# Patient Record
Sex: FEMALE | Race: WHITE | NOT HISPANIC OR LATINO | Employment: OTHER | URBAN - METROPOLITAN AREA
[De-identification: names, ages, dates, MRNs, and addresses within clinical notes are randomized per-mention and may not be internally consistent; named-entity substitution may affect disease eponyms.]

---

## 2017-01-13 ENCOUNTER — ALLSCRIPTS OFFICE VISIT (OUTPATIENT)
Dept: OTHER | Facility: OTHER | Age: 62
End: 2017-01-13

## 2017-01-13 ENCOUNTER — LAB REQUISITION (OUTPATIENT)
Dept: LAB | Facility: HOSPITAL | Age: 62
End: 2017-01-13
Payer: COMMERCIAL

## 2017-01-13 DIAGNOSIS — R39.9 UNSPECIFIED SYMPTOMS AND SIGNS INVOLVING THE GENITOURINARY SYSTEM: ICD-10-CM

## 2017-01-13 DIAGNOSIS — Z12.31 ENCOUNTER FOR SCREENING MAMMOGRAM FOR MALIGNANT NEOPLASM OF BREAST: ICD-10-CM

## 2017-01-13 LAB
BILIRUB UR QL STRIP: NORMAL
CLARITY UR: NORMAL
COLOR UR: NORMAL
GLUCOSE (HISTORICAL): NORMAL
HGB UR QL STRIP.AUTO: NORMAL
KETONES UR STRIP-MCNC: NORMAL MG/DL
LEUKOCYTE ESTERASE UR QL STRIP: NORMAL
NITRITE UR QL STRIP: NORMAL
PH UR STRIP.AUTO: 5 [PH]
PROT UR STRIP-MCNC: NORMAL MG/DL
SP GR UR STRIP.AUTO: 1.02
UROBILINOGEN UR QL STRIP.AUTO: 0.2

## 2017-01-13 PROCEDURE — 87186 SC STD MICRODIL/AGAR DIL: CPT | Performed by: FAMILY MEDICINE

## 2017-01-13 PROCEDURE — 87086 URINE CULTURE/COLONY COUNT: CPT | Performed by: FAMILY MEDICINE

## 2017-01-13 PROCEDURE — 87077 CULTURE AEROBIC IDENTIFY: CPT | Performed by: FAMILY MEDICINE

## 2017-01-15 LAB — BACTERIA UR CULT: NORMAL

## 2017-01-24 ENCOUNTER — LAB REQUISITION (OUTPATIENT)
Dept: LAB | Facility: HOSPITAL | Age: 62
End: 2017-01-24
Payer: COMMERCIAL

## 2017-01-24 ENCOUNTER — ALLSCRIPTS OFFICE VISIT (OUTPATIENT)
Dept: OTHER | Facility: OTHER | Age: 62
End: 2017-01-24

## 2017-01-24 DIAGNOSIS — R39.9 UNSPECIFIED SYMPTOMS AND SIGNS INVOLVING THE GENITOURINARY SYSTEM: ICD-10-CM

## 2017-01-24 LAB
BILIRUB UR QL STRIP: NORMAL
CLARITY UR: NORMAL
COLOR UR: CLEAR
GLUCOSE (HISTORICAL): NORMAL
HGB UR QL STRIP.AUTO: NORMAL
KETONES UR STRIP-MCNC: NORMAL MG/DL
LEUKOCYTE ESTERASE UR QL STRIP: NORMAL
NITRITE UR QL STRIP: NORMAL
PH UR STRIP.AUTO: 6 [PH]
PROT UR STRIP-MCNC: NORMAL MG/DL
SP GR UR STRIP.AUTO: 1
UROBILINOGEN UR QL STRIP.AUTO: 0.2

## 2017-01-24 PROCEDURE — 87086 URINE CULTURE/COLONY COUNT: CPT | Performed by: FAMILY MEDICINE

## 2017-01-25 LAB — BACTERIA UR CULT: NORMAL

## 2017-03-02 ENCOUNTER — ALLSCRIPTS OFFICE VISIT (OUTPATIENT)
Dept: OTHER | Facility: OTHER | Age: 62
End: 2017-03-02

## 2017-03-02 DIAGNOSIS — R74.8 ABNORMAL LEVELS OF OTHER SERUM ENZYMES: ICD-10-CM

## 2017-05-05 ENCOUNTER — ALLSCRIPTS OFFICE VISIT (OUTPATIENT)
Dept: OTHER | Facility: OTHER | Age: 62
End: 2017-05-05

## 2017-05-15 ENCOUNTER — ALLSCRIPTS OFFICE VISIT (OUTPATIENT)
Dept: OTHER | Facility: OTHER | Age: 62
End: 2017-05-15

## 2017-05-15 ENCOUNTER — GENERIC CONVERSION - ENCOUNTER (OUTPATIENT)
Dept: OTHER | Facility: OTHER | Age: 62
End: 2017-05-15

## 2017-06-05 ENCOUNTER — ALLSCRIPTS OFFICE VISIT (OUTPATIENT)
Dept: OTHER | Facility: OTHER | Age: 62
End: 2017-06-05

## 2017-06-05 DIAGNOSIS — I10 ESSENTIAL (PRIMARY) HYPERTENSION: ICD-10-CM

## 2017-06-05 DIAGNOSIS — E07.9 DISORDER OF THYROID: ICD-10-CM

## 2017-06-05 DIAGNOSIS — R74.8 ABNORMAL LEVELS OF OTHER SERUM ENZYMES: ICD-10-CM

## 2017-06-15 ENCOUNTER — LAB (OUTPATIENT)
Dept: LAB | Facility: HOSPITAL | Age: 62
End: 2017-06-15
Attending: NEUROLOGICAL SURGERY
Payer: COMMERCIAL

## 2017-06-15 ENCOUNTER — TRANSCRIBE ORDERS (OUTPATIENT)
Dept: LAB | Facility: HOSPITAL | Age: 62
End: 2017-06-15

## 2017-06-15 ENCOUNTER — ALLSCRIPTS OFFICE VISIT (OUTPATIENT)
Dept: OTHER | Facility: OTHER | Age: 62
End: 2017-06-15

## 2017-06-15 DIAGNOSIS — Z01.812 PRE-OPERATIVE LABORATORY EXAMINATION: ICD-10-CM

## 2017-06-15 DIAGNOSIS — Z79.891 ENCOUNTER FOR LONG-TERM METHADONE USE: ICD-10-CM

## 2017-06-15 DIAGNOSIS — Z79.01 LONG TERM (CURRENT) USE OF ANTICOAGULANTS: ICD-10-CM

## 2017-06-15 DIAGNOSIS — G89.4 CHRONIC PAIN SYNDROME: Primary | ICD-10-CM

## 2017-06-15 DIAGNOSIS — G89.4 CHRONIC PAIN SYNDROME: ICD-10-CM

## 2017-06-15 LAB
ANION GAP SERPL CALCULATED.3IONS-SCNC: 4 MMOL/L (ref 4–13)
APTT PPP: 33 SECONDS (ref 23–35)
ATRIAL RATE: 51 BPM
ATRIAL RATE: 52 BPM
BASOPHILS # BLD AUTO: 0.02 THOUSANDS/ΜL (ref 0–0.1)
BASOPHILS NFR BLD AUTO: 0 % (ref 0–1)
BILIRUB UR QL STRIP: NEGATIVE
BUN SERPL-MCNC: 9 MG/DL (ref 5–25)
CALCIUM SERPL-MCNC: 9.2 MG/DL (ref 8.3–10.1)
CHLORIDE SERPL-SCNC: 105 MMOL/L (ref 100–108)
CLARITY UR: CLEAR
CO2 SERPL-SCNC: 29 MMOL/L (ref 21–32)
COLOR UR: YELLOW
CREAT SERPL-MCNC: 0.67 MG/DL (ref 0.6–1.3)
EOSINOPHIL # BLD AUTO: 0.09 THOUSAND/ΜL (ref 0–0.61)
EOSINOPHIL NFR BLD AUTO: 1 % (ref 0–6)
ERYTHROCYTE [DISTWIDTH] IN BLOOD BY AUTOMATED COUNT: 16.1 % (ref 11.6–15.1)
EST. AVERAGE GLUCOSE BLD GHB EST-MCNC: 117 MG/DL
GFR SERPL CREATININE-BSD FRML MDRD: >60 ML/MIN/1.73SQ M
GLUCOSE P FAST SERPL-MCNC: 79 MG/DL (ref 65–99)
GLUCOSE UR STRIP-MCNC: NEGATIVE MG/DL
HBA1C MFR BLD: 5.7 % (ref 4.2–6.3)
HCT VFR BLD AUTO: 37.3 % (ref 34.8–46.1)
HGB BLD-MCNC: 12.3 G/DL (ref 11.5–15.4)
HGB UR QL STRIP.AUTO: NEGATIVE
INR PPP: 0.93 (ref 0.86–1.16)
KETONES UR STRIP-MCNC: NEGATIVE MG/DL
LEUKOCYTE ESTERASE UR QL STRIP: NEGATIVE
LYMPHOCYTES # BLD AUTO: 2.81 THOUSANDS/ΜL (ref 0.6–4.47)
LYMPHOCYTES NFR BLD AUTO: 42 % (ref 14–44)
MCH RBC QN AUTO: 29.4 PG (ref 26.8–34.3)
MCHC RBC AUTO-ENTMCNC: 33 G/DL (ref 31.4–37.4)
MCV RBC AUTO: 89 FL (ref 82–98)
MONOCYTES # BLD AUTO: 0.57 THOUSAND/ΜL (ref 0.17–1.22)
MONOCYTES NFR BLD AUTO: 9 % (ref 4–12)
NEUTROPHILS # BLD AUTO: 3.2 THOUSANDS/ΜL (ref 1.85–7.62)
NEUTS SEG NFR BLD AUTO: 48 % (ref 43–75)
NITRITE UR QL STRIP: NEGATIVE
NRBC BLD AUTO-RTO: 0 /100 WBCS
P AXIS: 25 DEGREES
P AXIS: 38 DEGREES
PH UR STRIP.AUTO: 6 [PH] (ref 4.5–8)
PLATELET # BLD AUTO: 329 THOUSANDS/UL (ref 149–390)
PMV BLD AUTO: 10.3 FL (ref 8.9–12.7)
POTASSIUM SERPL-SCNC: 4.6 MMOL/L (ref 3.5–5.3)
PR INTERVAL: 156 MS
PR INTERVAL: 162 MS
PROT UR STRIP-MCNC: NEGATIVE MG/DL
PROTHROMBIN TIME: 12.5 SECONDS (ref 12.1–14.4)
QRS AXIS: 38 DEGREES
QRS AXIS: 39 DEGREES
QRSD INTERVAL: 80 MS
QRSD INTERVAL: 84 MS
QT INTERVAL: 406 MS
QT INTERVAL: 410 MS
QTC INTERVAL: 377 MS
QTC INTERVAL: 377 MS
RBC # BLD AUTO: 4.19 MILLION/UL (ref 3.81–5.12)
SODIUM SERPL-SCNC: 138 MMOL/L (ref 136–145)
SP GR UR STRIP.AUTO: 1.01 (ref 1–1.03)
T WAVE AXIS: 32 DEGREES
T WAVE AXIS: 36 DEGREES
UROBILINOGEN UR QL STRIP.AUTO: 0.2 E.U./DL
VENTRICULAR RATE: 51 BPM
VENTRICULAR RATE: 52 BPM
WBC # BLD AUTO: 6.7 THOUSAND/UL (ref 4.31–10.16)

## 2017-06-15 PROCEDURE — 93005 ELECTROCARDIOGRAM TRACING: CPT

## 2017-06-15 PROCEDURE — 85730 THROMBOPLASTIN TIME PARTIAL: CPT

## 2017-06-15 PROCEDURE — 85610 PROTHROMBIN TIME: CPT

## 2017-06-15 PROCEDURE — 36415 COLL VENOUS BLD VENIPUNCTURE: CPT

## 2017-06-15 PROCEDURE — 80048 BASIC METABOLIC PNL TOTAL CA: CPT

## 2017-06-15 PROCEDURE — 85025 COMPLETE CBC W/AUTO DIFF WBC: CPT

## 2017-06-15 PROCEDURE — 81003 URINALYSIS AUTO W/O SCOPE: CPT | Performed by: NEUROLOGICAL SURGERY

## 2017-06-15 PROCEDURE — 83036 HEMOGLOBIN GLYCOSYLATED A1C: CPT

## 2017-07-11 RX ORDER — AMLODIPINE BESYLATE 2.5 MG/1
5 TABLET ORAL DAILY
COMMUNITY
End: 2018-02-01 | Stop reason: SDUPTHER

## 2017-07-11 RX ORDER — LEVOTHYROXINE SODIUM 0.03 MG/1
25 TABLET ORAL DAILY
COMMUNITY
End: 2020-06-23 | Stop reason: SDUPTHER

## 2017-07-13 ENCOUNTER — GENERIC CONVERSION - ENCOUNTER (OUTPATIENT)
Dept: OTHER | Facility: OTHER | Age: 62
End: 2017-07-13

## 2017-07-13 RX ORDER — VANCOMYCIN HYDROCHLORIDE 1 G/200ML
1000 INJECTION, SOLUTION INTRAVENOUS
Status: DISCONTINUED | OUTPATIENT
Start: 2017-07-14 | End: 2017-07-14

## 2017-07-14 ENCOUNTER — ANESTHESIA (OUTPATIENT)
Dept: PERIOP | Facility: HOSPITAL | Age: 62
End: 2017-07-14
Payer: COMMERCIAL

## 2017-07-14 ENCOUNTER — ANESTHESIA EVENT (OUTPATIENT)
Dept: PERIOP | Facility: HOSPITAL | Age: 62
End: 2017-07-14
Payer: COMMERCIAL

## 2017-07-14 ENCOUNTER — HOSPITAL ENCOUNTER (OUTPATIENT)
Facility: HOSPITAL | Age: 62
Setting detail: OUTPATIENT SURGERY
Discharge: HOME | End: 2017-07-14
Attending: NEUROLOGICAL SURGERY | Admitting: NEUROLOGICAL SURGERY
Payer: COMMERCIAL

## 2017-07-14 ENCOUNTER — GENERIC CONVERSION - ENCOUNTER (OUTPATIENT)
Dept: PERIOP | Facility: HOSPITAL | Age: 62
End: 2017-07-14

## 2017-07-14 VITALS
TEMPERATURE: 98 F | RESPIRATION RATE: 16 BRPM | HEIGHT: 65 IN | WEIGHT: 155 LBS | SYSTOLIC BLOOD PRESSURE: 137 MMHG | OXYGEN SATURATION: 94 % | BODY MASS INDEX: 25.83 KG/M2 | HEART RATE: 48 BPM | DIASTOLIC BLOOD PRESSURE: 63 MMHG

## 2017-07-14 RX ORDER — OXYCODONE HYDROCHLORIDE AND ACETAMINOPHEN 5; 325 MG/1; MG/1
2 TABLET ORAL EVERY 4 HOURS PRN
Status: DISCONTINUED | OUTPATIENT
Start: 2017-07-14 | End: 2017-07-14 | Stop reason: HOSPADM

## 2017-07-14 RX ORDER — ONDANSETRON 2 MG/ML
4 INJECTION INTRAMUSCULAR; INTRAVENOUS ONCE AS NEEDED
Status: DISCONTINUED | OUTPATIENT
Start: 2017-07-14 | End: 2017-07-14 | Stop reason: HOSPADM

## 2017-07-14 RX ORDER — VANCOMYCIN HYDROCHLORIDE 1 G/200ML
1000 INJECTION, SOLUTION INTRAVENOUS
Status: COMPLETED | OUTPATIENT
Start: 2017-07-14 | End: 2017-07-14

## 2017-07-14 RX ORDER — ONDANSETRON 2 MG/ML
4 INJECTION INTRAMUSCULAR; INTRAVENOUS EVERY 6 HOURS PRN
Status: DISCONTINUED | OUTPATIENT
Start: 2017-07-14 | End: 2017-07-14 | Stop reason: HOSPADM

## 2017-07-14 RX ORDER — FENTANYL CITRATE 50 UG/ML
INJECTION, SOLUTION INTRAMUSCULAR; INTRAVENOUS AS NEEDED
Status: DISCONTINUED | OUTPATIENT
Start: 2017-07-14 | End: 2017-07-14 | Stop reason: SURG

## 2017-07-14 RX ORDER — MIDAZOLAM HYDROCHLORIDE 1 MG/ML
INJECTION INTRAMUSCULAR; INTRAVENOUS AS NEEDED
Status: DISCONTINUED | OUTPATIENT
Start: 2017-07-14 | End: 2017-07-14 | Stop reason: SURG

## 2017-07-14 RX ORDER — SODIUM CHLORIDE, SODIUM LACTATE, POTASSIUM CHLORIDE, CALCIUM CHLORIDE 600; 310; 30; 20 MG/100ML; MG/100ML; MG/100ML; MG/100ML
20 INJECTION, SOLUTION INTRAVENOUS CONTINUOUS
Status: DISCONTINUED | OUTPATIENT
Start: 2017-07-14 | End: 2017-07-14

## 2017-07-14 RX ORDER — PROPOFOL 10 MG/ML
INJECTION, EMULSION INTRAVENOUS AS NEEDED
Status: DISCONTINUED | OUTPATIENT
Start: 2017-07-14 | End: 2017-07-14 | Stop reason: SURG

## 2017-07-14 RX ORDER — LIDOCAINE HYDROCHLORIDE AND EPINEPHRINE 5; 5 MG/ML; UG/ML
INJECTION, SOLUTION INFILTRATION; PERINEURAL AS NEEDED
Status: DISCONTINUED | OUTPATIENT
Start: 2017-07-14 | End: 2017-07-14 | Stop reason: HOSPADM

## 2017-07-14 RX ORDER — FENTANYL CITRATE/PF 50 MCG/ML
50 SYRINGE (ML) INJECTION
Status: DISCONTINUED | OUTPATIENT
Start: 2017-07-14 | End: 2017-07-14 | Stop reason: HOSPADM

## 2017-07-14 RX ADMIN — PROPOFOL 50 MG: 10 INJECTION, EMULSION INTRAVENOUS at 12:50

## 2017-07-14 RX ADMIN — SODIUM CHLORIDE, SODIUM LACTATE, POTASSIUM CHLORIDE, AND CALCIUM CHLORIDE 20 ML/HR: .6; .31; .03; .02 INJECTION, SOLUTION INTRAVENOUS at 12:22

## 2017-07-14 RX ADMIN — MIDAZOLAM HYDROCHLORIDE 2 MG: 1 INJECTION, SOLUTION INTRAMUSCULAR; INTRAVENOUS at 12:40

## 2017-07-14 RX ADMIN — PROPOFOL 30 MG: 10 INJECTION, EMULSION INTRAVENOUS at 12:45

## 2017-07-14 RX ADMIN — PROPOFOL 20 MG: 10 INJECTION, EMULSION INTRAVENOUS at 12:55

## 2017-07-14 RX ADMIN — FENTANYL CITRATE 100 MCG: 50 INJECTION, SOLUTION INTRAMUSCULAR; INTRAVENOUS at 12:40

## 2017-07-14 RX ADMIN — VANCOMYCIN HYDROCHLORIDE 1000 MG: 1 INJECTION, SOLUTION INTRAVENOUS at 12:25

## 2017-07-14 RX ADMIN — VANCOMYCIN HYDROCHLORIDE 1000 MG: 1 INJECTION, SOLUTION INTRAVENOUS at 12:27

## 2017-07-25 ENCOUNTER — ALLSCRIPTS OFFICE VISIT (OUTPATIENT)
Dept: OTHER | Facility: OTHER | Age: 62
End: 2017-07-25

## 2017-07-28 ENCOUNTER — ALLSCRIPTS OFFICE VISIT (OUTPATIENT)
Dept: OTHER | Facility: OTHER | Age: 62
End: 2017-07-28

## 2017-09-14 ENCOUNTER — ALLSCRIPTS OFFICE VISIT (OUTPATIENT)
Dept: OTHER | Facility: OTHER | Age: 62
End: 2017-09-14

## 2017-09-14 DIAGNOSIS — M81.0 AGE-RELATED OSTEOPOROSIS WITHOUT CURRENT PATHOLOGICAL FRACTURE: ICD-10-CM

## 2017-09-21 ENCOUNTER — GENERIC CONVERSION - ENCOUNTER (OUTPATIENT)
Dept: OTHER | Facility: OTHER | Age: 62
End: 2017-09-21

## 2017-09-27 ENCOUNTER — GENERIC CONVERSION - ENCOUNTER (OUTPATIENT)
Dept: OTHER | Facility: OTHER | Age: 62
End: 2017-09-27

## 2017-10-26 ENCOUNTER — GENERIC CONVERSION - ENCOUNTER (OUTPATIENT)
Dept: OTHER | Facility: OTHER | Age: 62
End: 2017-10-26

## 2017-11-29 ENCOUNTER — HOSPITAL ENCOUNTER (OUTPATIENT)
Dept: BONE DENSITY | Facility: MEDICAL CENTER | Age: 62
Discharge: HOME | End: 2017-11-29
Payer: COMMERCIAL

## 2017-11-29 DIAGNOSIS — M81.0 AGE-RELATED OSTEOPOROSIS WITHOUT CURRENT PATHOLOGICAL FRACTURE: ICD-10-CM

## 2017-11-29 PROCEDURE — 77080 DXA BONE DENSITY AXIAL: CPT

## 2017-12-13 ENCOUNTER — ALLSCRIPTS OFFICE VISIT (OUTPATIENT)
Dept: OTHER | Facility: OTHER | Age: 62
End: 2017-12-13

## 2017-12-13 DIAGNOSIS — R14.0 ABDOMINAL DISTENSION (GASEOUS): ICD-10-CM

## 2017-12-15 NOTE — PROGRESS NOTES
Assessment  1  Abdominal bloating (787 3) (R14 0)   2  Chronic pain syndrome (338 4) (G89 4)   3  Fibromyalgia (729 1) (M79 7)   · migratory pain and restless leg, insomnia   4  Benign essential HTN (401 1) (I10)    Plan  Abdominal bloating    · * US ABDOMEN COMPLETE; Status:Hold For - Scheduling; Requested for:02Ozt9252;   Benign essential HTN, PMH: Other chronic pain    · Metoprolol Succinate ER 50 MG Oral Tablet Extended Release 24 Hour  Cervicalgia, Chronic pain syndrome, Generalized osteoarthritis, Peripheral neuropathy,Postlaminectomy syndrome of lumbar region    · Methocarbamol 500 MG Oral Tablet; TAKE 1 TABLET BY MOUTH EVERY 8HOURS AS NEEDED FOR SPASM  Disc degeneration, lumbar    · From  FentaNYL 25 MCG/HR Transdermal Patch 72 Hour apply 1 patch every72 hours To FentaNYL 50 MCG/HR Transdermal Patch 72 Hour APPLY 1 PATCH EVERY3 DAYS  Osteoporosis    · * Dexa Scan Axial Skel (Hip, Pelvis, Spine) ; every 2 years; Last 20OQV9081; Tgst27Kyd1415; Status:Active  Peripheral neuropathy    · Gabapentin 600 MG Oral Tablet; take 1 tablet by mouth 5 TIMES daily    Chief Complaint  Is having a lot of pain, stated her feet almost always feel cold and wet, pt stated that she lost a half a bottle of gabapentin  Patient is here today for follow up of chronic conditions described in HPI  History of Present Illness  The patient is being seen for worsening symptoms of non-malignancy related abdominal pain  Active Problems  1  Abdominal bloating (787 3) (R14 0)   2  Benign essential HTN (401 1) (I10)   3  Celiac disease (579 0) (K90 0)   4  Cervicalgia (723 1) (M54 2)   5  Chronic pain syndrome (338 4) (G89 4)   6  Cigarette smoker (305 1) (F17 210)   7  Common bile duct dilation (576 8) (K83 8)   8  Compression fracture of L1 lumbar vertebra (805 4) (S32 010A)   9  Depression (311) (F32 9)   10  Fibromyalgia (729 1) (M79 7)   11  Generalized osteoarthritis (715 00) (M15 9)   12   GERD (gastroesophageal reflux disease) (530 81) (K21 9)   13  History of gastric bypass (V45 86) (Z98 890)   14  Osteoarthrosis of knee (715 36) (M17 10)   15  Osteoporosis (733 00) (M81 0)   16  Peripheral neuropathy (356 9) (G62 9)   17  Postlaminectomy syndrome of lumbar region (722 83) (M96 1)   18  Restless legs syndrome (333 94) (G25 81)   19  Thyroid disorder (246 9) (E07 9)   20  Urinary incontinence (788 30) (R32)    Past Medical History  1  History of Abdominal pain, epigastric (789 06) (R10 13)   2  History of Abdominal pain, RUQ (789 01) (R10 11)   3  History of Anemia (285 9) (D64 9)   4  History of Asthma (493 90) (J45 909)   5  History of Asymptomatic menopausal state (V49 81) (Z78 0)   6  History of Backache (724 5) (M54 9)   7  History of Bowel incontinence (787 60) (R15 9)   8  History of Cigarette smoker (305 1) (F17 210)   9  History of Cough (786 2) (R05)   10  History of Galactorrhea With Normal Prolactins (611 6)   11  History of Gastritis (535 50) (K29 70)   12  History of abdominal pain (V13 89) (Z87 898)   13  History of diabetes mellitus (V12 29) (Z86 39)   14  History of diarrhea (V12 79) (Z87 898)   15  History of malignant neoplasm (V10 90) (Z85 9)   16  History of osteopenia (V13 59) (Z87 39)   17  History of thyroid disease (V12 29) (Z86 39)   18  History of upper respiratory infection (V12 09) (Z87 09)   19  History of Hyperlipidemia (272 4) (E78 5)   20  History of Hypoglycemia (251 2) (E16 2)   21  History of Incisional hernia (553 21) (K43 2)   22  History of Lymphadenopathy (785 6) (R59 1)   23  History of Malabsorption syndrome (579 9) (K90 9)   24  History of Need for influenza vaccination (V04 81) (Z23)   25  History of Obesity (278 00) (E66 9)   26  History of Pre-procedural examination (V72 84) (Z01 818)   27  History of Upper respiratory infection (465 9) (J06 9)    The active problems and past medical history were reviewed and updated today  Surgical History  1  History of Appendectomy   2  History of Back Surgery   3  History of  Section   4  History of Gastric Surgery For Morbid Obesity Gastric Bypass   5  History of Hernia Repair   6  History of Knee Surgery   7  History of Tubal Ligation   8  History of Wrist Surgery    The surgical history was reviewed and updated today  Family History  Mother    1  Family history of diabetes mellitus (V18 0) (Z83 3)   2  Family history of hypertension (V17 49) (Z82 49)   3  Family history of Retinal disease  Father    4  Family history of lung cancer (V16 1) (Z80 1)   5  Family history of malignant neoplasm of brain (V16 8) (Z80 8)   6  Family history of renal failure (V18 69) (Z84 1)  Family History    7  Family history of cardiac disorder (V17 49) (Z82 49)   8  Family history of diabetes mellitus (V18 0) (Z83 3)   9  Family history of hypertension (V17 49) (Z82 49)   10  Family history of myocardial infarction (V17 3) (Z82 49)   11  Family history of High cholesterol    The family history was reviewed and updated today  Social History   · Denied: History of Alcohol use   · Current Every Day Smoker (305 1)   · Denied: History of Drug use   · Social alcohol use (Z78 9)  The social history was reviewed and updated today  Current Meds   1  AmLODIPine Besylate 2 5 MG Oral Tablet; take 1 tablet by mouth daily; Therapy: 81PAD2575 to (21 )  Requested for: 01BSZ1530; Last Rx:14Ytl5242 Ordered   2  BuPROPion HCl ER (SR) 150 MG Oral Tablet Extended Release 12 Hour; take 1 tablet by mouth daily; Therapy: 10JHZ1727 to (Evaluate:2018)  Requested for: 2017; Last Rx:2017 Ordered   3  Calcium CAPS; Therapy: (Recorded:35Zwc6865) to Recorded   4  Clotrimazole 10 MG Mouth/Throat Lozenge; ALLOW 1 LOZENGE TO SLOWLY DISSOLVE BY MOUTH FIVE TIMES DAILY; Therapy: 79XVR5391 to (Evaluate:84Bws7528)  Requested for: 62Icg7879; Last Rx:67Bxo4035 Ordered   5   DiazePAM 5 MG Oral Tablet; TAKE 1 TABLET BY MOUTH TWICE DAILY AS NEEDED; Therapy: 20HGR0536 to (Evaluate:03Jan2018)  Requested for: 87Vls0226; Last Rx:04Dec2017 Ordered   6  Dicyclomine HCl - 20 MG Oral Tablet; TAKE 1 TABLET BY MOUTH EVERY 6 HOURS AS NEEDED; Therapy: 37WFI4617 to (Evaluate:02Jun2018)  Requested for: 88HNK8773; Last Rx:04Dec2017 Ordered   7  FentaNYL 25 MCG/HR Transdermal Patch 72 Hour; apply 1 patch every 72 hours; Therapy: 55XCB1995 to (Evaluate:06Dec2017); Last Rx:06Nov2017 Ordered   8  FLUoxetine HCl - 10 MG Oral Tablet; take 1 tablet by mouth daily; Therapy: 94NQV4891 to (Evaluate:05Jun2018)  Requested for: 63Cqi8387; Last Rx:07Dec2017 Ordered   9  Gabapentin 600 MG Oral Tablet; take 1 tablet by mouth 5 TIMES daily; Therapy: 72Rbl6370 to (Evaluate:07Jan2018)  Requested for: 37GCY3900; Last Rx:09Oct2017 Ordered   10  Hair/Skin/Nails TABS; Therapy: (Recorded:66Wxg3934) to Recorded   11  Levothyroxine Sodium 25 MCG Oral Tablet; take 1 tablet by mouth daily; Therapy: 47BJH6172 to (Evaluate:04Jan2018)  Requested for: 05MLP7602; Last  Rx:06Oct2017 Ordered   12  Lisinopril 10 MG Oral Tablet; take 1 tablet by mouth daily; Therapy: 38Lek6538 to (Evaluate:04Jan2018)  Requested for: 46VNG4048; Last  Rx:06Oct2017 Ordered   13  Methocarbamol 500 MG Oral Tablet; TAKE 1 TABLET BY MOUTH EVERY 8 HOURS AS  NEEDED FOR SPASM; Therapy: (Recorded:79Qdz6141) to Recorded   14  Metoprolol Succinate ER 50 MG Oral Tablet Extended Release 24 Hour; take 1 tablet by  mouth daily; Therapy: 38Qgr4294 to (Evaluate:18Feb2018)  Requested for: 37QWV0424; Last  Rx:20Nov2017 Ordered   15  Mupirocin 2 % External Ointment; APPLY SPARINGLY EXTERNALLY TO THE AFFECTED  AREA 2 TO 3 TIMES DAILY; Therapy: 83SYY2893 to (Evaluate:05Apr2017)  Requested for: 35Sah4380; Last  Rx:26Mar2017 Ordered   16  Oxybutynin Chloride 5 MG Oral Tablet; TAKE 1 TABLET BY MOUTH THREE TIMES DAILY; Therapy: 15IWM4190 to (Evaluate:23Jan2018)  Requested for: 29Oct2017; Last  ES:14ZOO8354 Ordered   17   Oxycodone-Acetaminophen 5-325 MG Oral Tablet; TAKE ONE TABLET EVERY SIX  HOURS AS NEEDED FOR BREAKTHROUGH PAIN;  Therapy: 39Mca1748 to (Last Rx:51Aoj6770) Ordered   18  Pantoprazole Sodium 40 MG Oral Tablet Delayed Release; take 2 tablets by mouth daily; Therapy: 53TSW8910 to (Evaluate:56Zsv1951)  Requested for: 11XSU6131; Last  Rx:21Nov2017 Ordered   19  Simethicone 125 MG Oral Tablet Chewable; CHEW AND SWALLOW 1 TABLET 4 TIMES  DAILY, AFTER MEALS AND AT BEDTIME; Therapy: 30BGQ5712 to (Evaluate:08Nov2015); Last Rx:28Dfq0128 Ordered   20  Sucralfate 1 GM Oral Tablet; TAKE 1 TABLET BY MOUTH FOUR TIMES DAILY; Therapy: 28YUO7229 to (QDNPWASS:87TFH2167)  Requested for: 29Oct2017; Last  OK:57BUO7645 Ordered   21  Super B Complex TABS; TAKE 1 TABLET DAILY AS DIRECTED; Therapy: (Recorded:03Ivx3245) to Recorded   22  Vitamin D 2000 UNIT Oral Capsule; Therapy: (Recorded:38Gvu5397) to Recorded   23  Zostavax 75896 UNT/0 65ML Subcutaneous Solution Reconstituted; inject subq; Therapy: 95Qzs2467 to (Last Rx:11Mma0860) Ordered    Allergies  1  Morphine Derivatives   2  Penicillins   3  Cortisone Acetate TABS   4  Morphine Sulfate TABS  5  Adhesive Tape    Vitals  Vital Signs    Recorded: 39Qev3276 02:59PM Recorded: 44WUL7764 63:29JV   Systolic 400    Diastolic 72    Height  5 ft 5 in   Weight  154 lb    BMI Calculated  25 63   BSA Calculated  1 77     Results/Data  * DXA BONE DENSITY SPINE HIP AND PELVIS 15YRU4228 18:08YI Adam Bliss Order Number: [id number]   - Patient Instructions: To schedule this appointment, please contact Central Scheduling at [phone]  Test Name Result Flag Reference   DXA BONE DENSITY SPINE HIP AND PELVIS (Report)     CENTRAL DXA SCAN   CLINICAL HISTORY:  58year old post-menopausal  female risk factors include estrogen deficiency  Current history smoking  Fracture left wrist  Premature menopause  TECHNIQUE: Bone densitometry was performed using a [employer] W bone densitometer   Regions of interest appear properly placed  There are no obvious fractures or other confounding variables which could limit the study  Degenerative or   osteoarthritic changes along with pins are noted on the spine image at L2-3 and 4  L3 and 4 are eliminated by the computer but the entire spine density result is probably inaccurate  COMPARISON: Follow-up   RESULTS:   LUMBAR SPINE: L1-L2:  BMD 0 957 gm/cm2  T-score normal, -0 2 and 10% higher than in 2014, statistically significant but related to the spine image changes  Z-score +1 3   LEFT TOTAL HIP:  BMD 0 718 gm/cm2  T-score below normal, -1 8  Z-score -0 8   LEFT FEMORAL NECK:  BMD 0 571 gm/cm2  T-score below normal, -2 5, osteoporosis, and 3% less than 2014  Z-score -1 1   A 25-hydroxy vitamin D level, an intact PTH, and a comprehensive metabolic panel are suggested in this patient  Treatment is also suggested perhaps with Prolia followed by intravenous Reclast         IMPRESSION:  1  Based on the [facility] classification, this study identifies a diagnosis of osteoporosis, notable at the femoral neck area and the patient is considered at elevated risk for fracture  2  A daily intake of calcium of at least 1200 mg and vitamin D, 800-1000 IU, as well as weight bearing and muscle strengthening exercise, fall prevention and avoidance of tobacco and excessive alcohol intake as basic preventive measures are recommended  3  Repeat DXA scan on the same equipment in 18-24 months as clinically indicated  The 10 year risk of hip fracture is 3 8%, with the 10 year risk of major osteoporotic fracture being 13%, as calculated by the [facility] fracture risk assessment tool (FRAX)  The current NOF guidelines recommend treating patients with FRAX 10 year risk score   of >3% for hip fracture and >20% for major osteoporotic fracture  Hip fracture risk exceeds treatment thresholds     WHO CLASSIFICATION:  Normal (a T-score of -1 0 or higher)  Low bone mineral density (a T-score of less than -1 0 but higher than -2 5)  Osteoporosis (a T-score of -2 5 or less)  Severe osteoporosis (a T-score of -2 5 or less with a fragility fracture)    Thank you for allowing us the opportunity to participate in your patient care  The expanded DEXA report will no longer be arriving in your mail  If you desire to view the full report please contact [address] [place] [address] or access the PACS system  Workstation performed: [id number]   Signed by:  Ankur Ordoñez MD  12/2/17       Health Management  History of Other screening mammogram   Digital Bilateral Screening Mammogram With CAD; every 1 year; Next Due: 71HGI1475; Overdue  History of Screening for colon cancer   COLONOSCOPY; every 10 years; Last 09IOY9258; Next Due: 85UFR3163; Active  Osteoporosis   * Dexa Scan Axial Skel (Hip, Pelvis, Spine); every 2 years; Last 51HPF1803; Next Due: 61Dmi5312; Overdue    Signatures   Electronically signed by : Meir Lee DO; Dec 14 1514 10:20AM EST                       (Author)

## 2017-12-21 ENCOUNTER — GENERIC CONVERSION - ENCOUNTER (OUTPATIENT)
Dept: FAMILY MEDICINE CLINIC | Facility: CLINIC | Age: 62
End: 2017-12-21

## 2018-01-03 ENCOUNTER — HOSPITAL ENCOUNTER (OUTPATIENT)
Dept: ULTRASOUND IMAGING | Facility: MEDICAL CENTER | Age: 63
Discharge: HOME | End: 2018-01-03
Payer: COMMERCIAL

## 2018-01-03 DIAGNOSIS — R14.0 ABDOMINAL DISTENSION (GASEOUS): ICD-10-CM

## 2018-01-03 PROCEDURE — 76700 US EXAM ABDOM COMPLETE: CPT

## 2018-01-10 NOTE — CONSULTS
Assessment  Assessed    1  Chronic pain syndrome (338 4) (G89 4)   2  Postlaminectomy syndrome of lumbar region (722 83) (M96 1)    Plan  Abdominal pain, RUQ, History of gastric bypass    · Carafate 1 GM/10ML Oral Suspension   Rx By: Nina Grace; Dispense: 30 Days ; #:1200 ML; Refill: 3; For: Abdominal pain, RUQ, History of gastric bypass; BRADLEY = N; Verified Transmission to [address] [place] [address]; Last Updated By: Callum Mcginnis; 2/26/2016 1:42:12 PM  Chronic pain syndrome    · Procedure Flowsheet; Status:Complete;   Done: 98LCC1737 01:01PM   Performed: In Office; VTY:[id number]; For:Chronic pain syndrome; Ordered By:Raj Westbrook;  Chronic pain syndrome, Postlaminectomy syndrome of lumbar region    · * MRI THORACIC SPINE WO CONTRAST; Status:Need Information - Financial  Authorization; Requested for:[id number];    Perform:[facility]; Order Comments:SCS planning; FZB:[id number]; For:Chronic pain syndrome, Postlaminectomy syndrome of lumbar region; Ordered By:Raj Westbrook; Disc degeneration, lumbar, Lumbar stenosis without neurogenic claudication    · OxyCONTIN 40 MG Oral Tablet ER 12 Hour Abuse-Deterrent   Rx By: Oziel Hernandez; Dispense: 30 Days ; #:60 Tablet ER 12 Hour Abuse-Deterrent; Refill: 0; For: Disc degeneration, lumbar, Lumbar stenosis without neurogenic claudication; BRADLEY = N; Print Rx; Msg to Pharmacy: Do Not Fill Before Due; Last Updated By: Callum Mcginnis; 2/26/2016 1:43:22 PM    Discussion/Summary    Ms Doreen Guzman is a very pleasant 71-year-old female sent from Dr Rigo Yates for consideration of spinal cord stimulator trial  I do appreciate his kind referral and the opportunity to participate in his patient's care  The patient does have past medical history significant for L3-4 spinal fusion, hemilaminectomy, and facetectomy  The patient is experiencing severe chronic back and radiating leg pain consistent with postlaminectomy syndrome      She has tried numerous conservative options with Dr Ryan Calvert including medication trials and injections  I do believe the patient may be a good candidate for a spinal cord stimulator for chronic pain  I reviewed the nature of the neurostimulation in detail, discussed the role of the trial as well as the permanent implantation  The technology as well as the approach was demonstrated using models and additional literature, DVD was provided  We will obtain the requisite psychological authorization/clearance to rule out any significant psychological comorbidity that would prevent the patient from benefiting  In addition, will obtain MRI of the thoracic spine to rule out any significant stenosis that would prevent lead passage  We will make arrangements for the patient to participate in an patient education session as well  Complete risks and benefits including bleeding, infection, tissue reaction, allergic reaction, nerve injury, paralysis, CSF leak/spinal headache were reviewed  Chief Complaint  Chief Complaints    1  Back Pain    History of Present Illness  Ms Marycarmen Cook is a very pleasant 57-year-old female sent from Dr Ryan Calvert for consideration of spinal cord stimulator trial  The patient does have past medical history significant for L3-4 spinal fusion, hemilaminectomy, and facetectomy  The patient is experiencing severe chronic back and radiating leg pain consistent with postlaminectomy syndrome  The patient states she has been experiencing severe pain for the past 3 years  She is unable to determine any inciting event or trauma  She states the pain is moderate to severe rated as an 8/10 and states that this is constant without any typical pattern  She describes back and radiating leg pain left worse than right  She characterizes this as sharp, shooting, burning, and throbbing  She also does describe subjective upper and lower extremity weakness as well as dropping objects and occasional falls   She does use a cane or walker for ambulation  Aggravating factors include standing, bending, walking, exercise, and bowel movements  She is unable to determine any significant alleviating factors  Diagnostic studies include MRI of the lumbar spine  This does reveal her surgical changes as well as an L1 compression fracture  She did receive moderate relief with the past surgery and states no relief with injections or acupuncture  She is currently taking medications in the form of hydrocodone, oxycodone, OxyContin and experiences some relief with these  Patient denies any allergies to contrast dyes, local anesthetics, steroid preparations, or latex  The patient is not taking any anticoagulant medications  No contraindications to pursuing injection therapies  I have personally reviewed and/or updated the patient's past medical history, past surgical history, family history, social history, current medications, allergies, and vital signs today  Referring physician is  Dr Mamie Sandra   Primary Care physician is  Dr Renea Topete presents with complaints of gradual onset of constant episodes of severe bilateral lower back pain, described as sharp, burning and throbbing, radiating to the bilateral thigh, bilateral lower leg and bilateral foot  On a scale of 1 to 10, the patient rates the pain as 8  Symptoms are unchanged  Review of Systems    Constitutional: no fever, no recent weight gain and no recent weight loss  Eyes: no double vision and no blurry vision  Cardiovascular: no chest pain, no palpitations and no lower extremity edema  Respiratory: no complaints of shortness of breath and no wheezing  Musculoskeletal: difficulty walking, joint stiffness, pain in extremity b/l legs and decreased range of motion, but no muscle weakness, no joint swelling and no limb swelling`  Neurological: memory loss, but no dizziness, no difficulty swallowing, no loss of consciousness and no seizures  Gastrointestinal: nausea and vomiting, but no constipation and no diarrhea  Genitourinary: no difficulty initiating urine stream, no genital pain and no frequent urination  Integumentary: no complaints of skin rash  Psychiatric: depression  Endocrine: no excessive thirst, no adrenal disease, no hypothyroidism and no hyperthyroidism  Hematologic/Lymphatic: a tendency for easy bruising, but no tendency for easy bleeding  Active Problems  Problems    1  Abdominal bloating (787 3) (R14 0)   2  Abdominal mass, RUQ (right upper quadrant) (789 31) (R19 01)   3  Abdominal pain, RUQ (789 01) (R10 11)   4  Abnormal liver function test (790 6) (R94 5)   5  Atypical chest pain (786 59) (R07 89)   6  Benign essential HTN (401 1) (I10)   7  Bilious vomiting with nausea (787 04) (R11 14)   8  Bowel incontinence (787 60) (R15 9)   9  Celiac disease (579 0) (K90 0)   10  Cervicalgia (723 1) (M54 2)   11  Change in stool habits (787 99) (R19 4)   12  Chest pain (786 50) (R07 9)   13  Cigarette smoker (305 1) (F17 210)   14  Common bile duct dilation (576 8) (K83 8)   15  Compression fracture of L1 lumbar vertebra (805 4) (S32 010A)   16  Dental disorder (525 9) (K08 9)   17  Depression (311) (F32 9)   18  Disc degeneration, 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)   19  Disorder of salivary gland (527 9) (K11 9)   20  Esophageal reflux (530 81) (K21 9)   21  Fibromyalgia (729 1) (M79 7)   22  Gastric ulcer (531 90) (K25 9)   23  Gastroparesis (536 3) (K31 84)   24  Generalized osteoarthritis (715 00) (M15 9)   25  GERD (gastroesophageal reflux disease) (530 81) (K21 9)   26  Hand injury (959 4) (S69 90XA)   27  History of gastric bypass (V45 86) (Z98 89)   28  History of incisional hernia repair (V45 89) (Z98 89)   29  Lumbar stenosis without neurogenic claudication (724 02) (M48 06)   30  Mouth swelling (784 2) (R22 0)   31  Neoplasm of uncertain behavior of skin (238 2) (D48 5)   32  Omphalitis (686 9)   33   Osteoarthrosis of knee (715 36) (M17 9)   34  Osteoporosis (733 00) (M81 0)   35  Other chronic pain (338 29) (G89 29)   36  Other screening mammogram (V76 12) (Z12 31)   37  Peripheral neuropathy (356 9) (G62 9)   38  Restless legs syndrome (333 94) (G25 81)   39  Screening for colon cancer (V76 51) (Z12 11)   40  Thyroid disorder (246 9) (E07 9)   41  Tremor (781 0) (R25 1)   42  Urinary incontinence (788 30) (R32)   43  Wrist pain (719 43) (M25 539)    Past Medical History  Problems    1  History of Abdominal pain, epigastric (789 06) (R10 13)   2  History of Abdominal pain, RUQ (789 01) (R10 11)   3  History of Anemia (285 9) (D64 9)   4  History of Asthma (493 90) (J45 909)   5  History of Asymptomatic menopausal state (V49 81) (Z78 0)   6  History of Backache (724 5) (M54 9)   7  History of Cigarette smoker (305 1) (F17 210)   8  History of Cough (786 2) (R05)   9  History of Galactorrhea With Normal Prolactins (611 6)   10  History of Gastritis (535 50) (K29 70)   11  History of abdominal pain (V13 89) (Z87 898)   12  History of diabetes mellitus (V12 29) (Z86 39)   13  History of diarrhea (V12 79) (Z87 898)   14  History of malignant neoplasm (V10 90) (Z85 9)   15  History of osteopenia (V13 59) (Z87 39)   16  History of upper respiratory infection (V12 09) (Z87 09)   17  History of Hyperlipidemia (272 4) (E78 5)   18  History of Hypoglycemia (251 2) (E16 2)   19  History of Incisional hernia (553 21) (K43 2)   20  History of Lymphadenopathy (785 6) (R59 1)   21  History of Malabsorption syndrome (579 9) (K90 9)   22  History of Need for influenza vaccination (V04 81) (Z23)   23  History of Obesity (278 00) (E66 9)   24  History of Pre-procedural examination (V72 84) (Z01 818)   25  History of Upper respiratory infection (465 9) (J06 9)    Surgical History  Problems    1  History of Appendectomy   2  History of Back Surgery   3  History of  Section   4  History of Gastric Surgery For Morbid Obesity Gastric Bypass   5  History of Hernia Repair   6  History of Knee Surgery   7  History of Tubal Ligation   8  History of Wrist Surgery    Family History  Mother    1  Family history of diabetes mellitus (V18 0) (Z83 3)   2  Family history of hypertension (V17 49) (Z82 49)   3  Family history of Retinal disease  Father    4  Family history of lung cancer (V16 1) (Z80 1)   5  Family history of malignant neoplasm of brain (V16 8) (Z80 8)   6  Family history of renal failure (V18 69) (Z84 1)  Family History    7  Family history of cardiac disorder (V17 49) (Z82 49)   8  Family history of diabetes mellitus (V18 0) (Z83 3)   9  Family history of hypertension (V17 49) (Z82 49)   10  Family history of myocardial infarction (V17 3) (Z82 49)   11  Family history of High cholesterol    Social History  Problems    · Denied: History of Alcohol use   · Current Every Day Smoker (305 1)   · Denied: History of Drug use   · Social alcohol use (Z78 9)    Current Meds   1  Amitriptyline HCl - 25 MG Oral Tablet; Take 1 tablet by mouth at bedtime; Therapy: 34LOH5888 to (Evaluate:20Mar2016)  Requested for: 44YIG3126; Last   Rx:73Ojt7797 Ordered   2  BuPROPion HCl ER (SR) 150 MG Oral Tablet Extended Release 12 Hour; take 1 tablet by   mouth daily; Therapy: 87VQL8362 to (Jacquiline Gavel)  Requested for: 87SSV8216; Last   Rx:00Aym5262 Ordered   3  Carafate 1 GM/10ML Oral Suspension; TAKE 2 TEASPOONFULS 4 TIMES DAILY; Therapy: 95ZAO7725 to (Evaluate:25Kra3981)  Requested for: 34BOX1872; Last   Rx:69Qel3428 Ordered   4  Diazepam 5 MG Oral Tablet; TAKE 1 TABLET BY MOUTH TWICE DAILY AS NEEDED; Therapy: 37VXY3039 to (Luisa French)  Requested for: 85BSG3870; Last   Rx:98Kyr4179 Ordered   5  Dicyclomine HCl - 20 MG Oral Tablet; TAKE 1 TABLET EVERY 6 HOURS AS NEEDED; Therapy: 97ZDW7438 to (Evaluate:12Mar2016)  Requested for: 55Jty1755; Last   Rx:72Gak2334 Ordered   6  FLUoxetine HCl - 10 MG Oral Tablet; TAKE 1 TABLET DAILY;    Therapy: 07EVV1808 to (Suzie Macdonald)  Requested for: 88NIK7326; Last   Rx:04Jan2016 Ordered   7  Gabapentin 600 MG Oral Tablet; TAKE 1 TABLET BY MOUTH FOUR TIMES DAILY; Therapy: 73AAM2134 to (Alley Lee)  Requested for: 03ZZN3032; Last   Rx:62Ubq4302 Ordered   8  Lisinopril 10 MG Oral Tablet; take 1 tablet by mouth daily; Therapy: 83Gqm1874 to (Evaluate:12Apr2016)  Requested for: 95QYJ3594; Last   Rx:13Jan2016 Ordered   9  Metoprolol Succinate ER 25 MG Oral Tablet Extended Release 24 Hour; take 1 tablet by   mouth daily; Therapy: 69UNE7565 to (Evaluate:00Wcb4359)  Requested for: 45BMR9557; Last   Rx:09Nov2015 Ordered   10  Oxybutynin Chloride 5 MG Oral Tablet; TAKE 1 TABLET BY MOUTH THREE TIMES DAILY; Therapy: 39LIU2941 to (Alcie Price)  Requested for: 12SNN2805; Last    Rx:87Adf4307 Ordered   11  Oxycodone-Acetaminophen 5-325 MG Oral Tablet; TAKE ONE TABLET EVERY SIX    HOURS AS NEEDED FOR BREAKTHROUGH PAIN;    Therapy: 58JII6575 to (Last Rx:91Dqv4293) Ordered   12  OxyCONTIN 40 MG Oral Tablet ER 12 Hour Abuse-Deterrent; TAKE 1 TABLET EVERY 12    HOURS DAILY PRN; Therapy: 37TQW5522 to (Evaluate:18Mar2016); Last Rx:92Vpg6809 Ordered   13  Pantoprazole Sodium 40 MG Oral Tablet Delayed Release; take 2 tablets by mouth daily; Therapy: 40WHJ5771 to (Anna Mena)  Requested for: 95LWB9702; Last    Rx:30Muh1408 Ordered   14  Probiotic Oral Capsule; USE AS DIRECTED; Therapy: (Recorded:61Qsw1649) to Recorded   15  Simethicone 125 MG Oral Tablet Chewable; CHEW AND SWALLOW 1 TABLET 4 TIMES    DAILY, AFTER MEALS AND AT BEDTIME; Therapy: 69YVL2860 to (Evaluate:08Nov2015); Last Rx:09Oct2015 Ordered   16  Super B Complex TABS; TAKE 1 TABLET DAILY AS DIRECTED; Therapy: (Recorded:87Ptj4719) to Recorded   17  Vitamin D 2000 UNIT Oral Capsule; Therapy: (Recorded:91Lnf8713) to Recorded    Allergies  Medication    1  Penicillins   2   Cortisone Acetate TABS    Vitals  Vital Signs [Data Includes: Current Encounter]    Recorded: 02Ftg5188 01:41PM   Temperature 98 6 F   Heart Rate 66   Respiration 16   Systolic 802   Diastolic 92   Height 5 ft 5 in   Weight 160 lb 2 08 oz   BMI Calculated 26 65   BSA Calculated 1 8   Pain Scale 8     Physical Exam    Constitutional   General appearance: Well developed, well nourished, alert, in no distress, non-toxic and no overt pain behavior  Eyes   Sclera: anicteric   HEENT   Hearing grossly intact  Pulmonary   Respiratory effort: Even and unlabored  Cardiovascular   Examination of extremities: No edema or pitting edema present  Skin   Skin and subcutaneous tissue: Normal without rashes or lesions, well hydrated  well healed incision over lumbar spine consistent with surgical history  Psychiatric   Mood and affect: Mood and affect appropriate  Neurologic   Cranial nerves: Cranial nerves II-XII grossly intact  Musculoskeletal   Gait and station: Abnormal   requires cane for ambulation, altered mechanics and antalgic appearing, able to stand on heels and toes  Lumbar/Sacral Spine examination demonstrates Lumbosacral Spine:   Lumbosacral Spine Sensory: intact to light touch and pinprick in the lower extremities  Flexion was restricted and was painful  Extension was restricted and was painful  Left lateral flexion was restricted and was painful  Right lateral flexion was restricted and was painful  Rotation to the left was not restricted and was painless  Rotation to the right was painless  ROM Hips: Full   Foot and ankle strength was normal bilaterally  Knee strength was normal bilaterally  Hip strength was normal bilaterally  Evaluation of Muscle Stretch Reflexes on the right side demonstrates 3/4 Knee Jerk Reflex, but 2/4 Ankle Jerk Reflex and negative right ankle clonus  Evaluation of Muscle Stretch Reflexes on the left side demonstrates 3/4 Knee Jerk Reflex and negative left ankle clonus, but 2/4 Ankle Jerk Reflex     Special Tests: negative Straight Leg Raise on right and negative Straight Leg Raise on left  Results/Data  Encounter Results   Procedure Flowsheet 57PYR4295 01:01PM Mary Ramirez     Test Name Result Flag Reference   Oswestry Score 50         Results    I personally reviewed the films/images in the office today  Radiology:  Lumbar x-ray 6/17/15: DDD with healed fusion of L3-4  Lumbar Spine 7/12/2011: 50% collapse of L1, lose of Lordosis  CT Scan  CT Lumbar Spine 6/29/15:  1  Mild scoliosis  2  Postsurgical changes at L3-L4 as noted  There is mild left bony foraminal stenosis at this level  3  Mild central spinal stenosis L4-L5  MRI:  Lumbar 10/7/15: 1  Acute/Subacute mild superior endplate compression fracture of L1  This is new since the CT examination of June 24, 2015    2  Surgical changes posteriorly at the L3=L$ level  Mild left foraminal stenosis is noted at this level  3  Small broad based right foraminal dis protrusion at L4-L5 causing mild to moderate foraminal stenosis  4  moderate to severe facet arthropathy and ligamentum flavum thickening at L4-L5 causing mild central canal stenosis  5  Moderate to severe facet arthropathy at L5-S1        Signatures   Electronically signed by : Chana Mora DO; Feb 26 2016  2:14PM EST                       (Author)

## 2018-01-10 NOTE — PROGRESS NOTES
Chief Complaint  Pt here for repeat urine after completing antibiotic  Specimen sent for culture, urine dip negative in office  Active Problems    1  Abdominal bloating (787 3) (R14 0)   2  Abdominal mass, RUQ (right upper quadrant) (789 31) (R19 01)   3  Abdominal pain, RUQ (789 01) (R10 11)   4  Abnormal liver function test (790 6) (R79 89)   5  Aftercare following surgery (V58 89) (Z48 89)   6  Atypical chest pain (786 59) (R07 89)   7  Benign essential HTN (401 1) (I10)   8  Bilious vomiting with nausea (787 04) (R11 14)   9  Celiac disease (579 0) (K90 0)   10  Cervicalgia (723 1) (M54 2)   11  Change in stool habits (787 99) (R19 4)   12  Chest pain (786 50) (R07 9)   13  Chronic pain syndrome (338 4) (G89 4)   14  Cigarette smoker (305 1) (F17 210)   15  Common bile duct dilation (576 8) (K83 8)   16  Compression fracture of L1 lumbar vertebra (805 4) (S32 010A)   17  Dental disorder (525 9) (K08 9)   18  Depression (311) (F32 9)   19  Disc degeneration, 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)   20  Disorder of salivary gland (527 9) (K11 9)   21  Elevated liver enzymes (790 5) (R74 8)   22  Encounter for removal of staples (V58 32) (Z48 02)   23  Encounter for screening for malignant neoplasm of colon (V76 51) (Z12 11)   24  Encounter for screening mammogram for malignant neoplasm of breast (V76 12)    (Z12 31)   25  Esophageal reflux (530 81) (K21 9)   26  Fibromyalgia (729 1) (M79 7)   27  Gastric ulcer (531 90) (K25 9)   28  Gastroparesis (536 3) (K31 84)   29  Generalized osteoarthritis (715 00) (M15 9)   30  GERD (gastroesophageal reflux disease) (530 81) (K21 9)   31  Hand injury (959 4) (S69 90XA)   32  History of gastric bypass (V45 86) (Z98 890)   33  History of incisional hernia repair (V45 89) (Z98 890,Z87 19)   34  Hospital-acquired pneumonia (486) (J18 9)   35  Lumbar stenosis without neurogenic claudication (724 02) (M48 06)   36  Mouth swelling (784 2) (R22 0)   37   Neoplasm of uncertain behavior of skin (238 2) (D48 5)   38  Omphalitis (686 9)   39  Oral thrush (112 0) (B37 0)   40  Osteoarthrosis of knee (715 36) (M17 9)   41  Osteoporosis (733 00) (M81 0)   42  Other chronic pain (338 29) (G89 29)   43  Other screening mammogram (V76 12) (Z12 31)   44  Peripheral neuropathy (356 9) (G62 9)   45  Postlaminectomy syndrome of lumbar region (722 83) (M96 1)   46  Postprocedural state (V45 89) (Z98 890)   47  Pre-op evaluation (V72 84) (Z01 818)   48  Pre-procedural laboratory examination (V72 63) (Z01 812)   49  Restless legs syndrome (333 94) (G25 81)   50  Screening for colon cancer (V76 51) (Z12 11)   51  Sepsis, due to unspecified organism (038 9,995 91) (A41 9)   52  Status post surgery (V45 89) (Z98 890)   53  Thyroid disorder (246 9) (E07 9)   54  Transient atrial fibrillation (427 31) (I48 91)   55  Tremor (781 0) (R25 1)   56  Urinary incontinence (788 30) (R32)   57  UTI symptoms (788 99) (R39 9)   58  Visit for pre-operative examination (V72 84) (Z01 818)   59  Wrist pain (719 43) (M25 539)    Current Meds   1  AmLODIPine Besylate 2 5 MG Oral Tablet; TAKE 1 TABLET DAILY; Therapy: 25SLR5441 to (Evaluate:21May2017)  Requested for: 11WAX5056; Last   Rx:22Nov2016 Ordered   2  BuPROPion HCl ER (SR) 150 MG Oral Tablet Extended Release 12 Hour; take 1 tablet   by mouth daily; Therapy: 90VVC8210 to (Evaluate:30Jan2017)  Requested for: 78GMK2941; Last   Rx:01Nov2016 Ordered   3  Ciprofloxacin HCl - 250 MG Oral Tablet; Take 1 tablet twice daily; Therapy: 63TLD9175 to (Evaluate:20Jan2017)  Requested for: 77UPI9166; Last   Rx:13Jan2017 Ordered   4  DiazePAM 5 MG Oral Tablet; TAKE 1 TABLET BY MOUTH TWICE DAILY AS NEEDED; Therapy: 56XGO0714 to ([phone])  Requested for: 41EVG0917; Last   Rx:24Jan2017; Status: ACTIVE - Retrospective By Protocol Authorization Ordered   5  Dicyclomine HCl - 20 MG Oral Tablet; TAKE 1 TABLET BY MOUTH EVERY 6 HOURS AS   NEEDED;    Therapy: 56IOB0399 to (Evaluate:24Apr2017)  Requested for: 68UNG6934; Last   Rx:15Kim3725 Ordered   6  FLUoxetine HCl - 10 MG Oral Tablet; take 1 tablet by mouth daily; Therapy: 34QNH1150 to (Evaluate:19Mar2017)  Requested for: 33Alw4985; Last   Rx:85Znh8738 Ordered   7  Gabapentin 600 MG Oral Tablet; TAKE 1 TABLET BY MOUTH 4 TIMES DAILY; Therapy: 74Xbt2169 to (Evaluate:06Kqo5315)  Requested for: 19NLE8033; Last   Rx:21Nov2016 Ordered   8  Levothyroxine Sodium 25 MCG Oral Tablet; take 1 tablet by mouth daily; Therapy: 49MAO2143 to ([phone])  Requested for: 54Shk4267; Last   Rx:43Nzi1037 Ordered   9  Lisinopril 10 MG Oral Tablet; take 1 tablet by mouth daily; Therapy: 21Von6967 to (Evaluate:82Enb8754)  Requested for: 89URB3323; Last   Rx:11Jan2017 Ordered   10  Metoprolol Succinate ER 25 MG Oral Tablet Extended Release 24 Hour; take one tablet    by mouth one time daily; Therapy: 12KVW5507 to (Evaluate:67Kqj3618); Last Rx:13Jan2017 Ordered   11  Oxybutynin Chloride 5 MG Oral Tablet; TAKE 1 TABLET BY MOUTH THREE TIMES    DAILY; Therapy: 68WFU6606 to (Evaluate:31Jan2017)  Requested for: 53QHA8643; Last    Rx:02Nov2016 Ordered   12  Oxycodone-Acetaminophen 5-325 MG Oral Tablet; TAKE ONE TABLET EVERY SIX    HOURS AS NEEDED FOR BREAKTHROUGH PAIN;    Therapy: 16Hdr9199 to (Last Rx:24Jan2017); Status: ACTIVE - Retrospective By    Protocol Authorization Ordered   13  OxyCONTIN 20 MG Oral Tablet ER 12 Hour Abuse-Deterrent; TAKE 1 TABLET EVERY    12 HOURS DAILY; Therapy: 49QMR4164 to (Evaluate:73Vae4136); Last Rx:24Jan2017; Status: ACTIVE -    Retrospective By Protocol Authorization Ordered   14  Pantoprazole Sodium 40 MG Oral Tablet Delayed Release; take 2 tablets by mouth    daily; Therapy: 69JHW1117 to (Evaluate:23Tht5298)  Requested for: 17AVT3450; Last    Rx:22Nov2016 Ordered   15  Simethicone 125 MG Oral Tablet Chewable; CHEW AND SWALLOW 1 TABLET 4 TIMES    DAILY, AFTER MEALS AND AT BEDTIME;     Therapy: 54TFM6066 to (Evaluate:08Nov2015); Last Rx:09Oct2015 Ordered   16  Super B Complex TABS; TAKE 1 TABLET DAILY AS DIRECTED; Therapy: (Recorded:60Off3294) to Recorded   17  Vitamin D 2000 UNIT Oral Capsule; Therapy: (Recorded:06Eqe0263) to Recorded    Allergies    1  Penicillins   2   Cortisone Acetate TABS    Results/Data  (1) URINE CULTURE 43AAO0371 11:82SH Kingsley Dennis Order Number: [id number]     Test Name Result Flag Reference   CLINICAL REPORT (Report)     Test:        Urine culture  Specimen Type:   Urine  Specimen Date:   1/24/2017 3:19 PM  Result Date:    1/25/2017 7:56 PM  Result Status:   Final result  Resulting Lab:   [facility] [address] [address]            Tel: [phone]      CULTURE                                       ------------------                                   <10,000 cfu/ml Mixed Contaminants X2   Performing Comments: culture & sensitivity     Urine Dip Non-Automated- POC 41TSB7125 98:98PL Delisa Jackson     Test Name Result Flag Reference   Color Clear     Clarity Transparent     Leukocytes neg     Nitrite neg     Blood neg     Bilirubin neg     Urobilinogen 0 2     Protein neg     Ph 6 0     Specific Gravity 1 005     Ketone neg     Glucose neg         Plan  Benign essential HTN    · Metoprolol Succinate ER 25 MG Oral Tablet Extended Release 24 Hour; take  one tablet by mouth one time daily  Depression    · DiazePAM 5 MG Oral Tablet; TAKE 1 TABLET BY MOUTH TWICE DAILY AS  NEEDED  Disc degeneration, lumbar, Lumbar stenosis without neurogenic claudication    · OxyCONTIN 20 MG Oral Tablet ER 12 Hour Abuse-Deterrent; TAKE 1 TABLET  EVERY 12 HOURS DAILY  Postprocedural state    · Oxycodone-Acetaminophen 5-325 MG Oral Tablet; TAKE ONE TABLET EVERY  SIX HOURS AS NEEDED FOR BREAKTHROUGH PAIN  UTI symptoms    · (1) URINE CULTURE; Source:Urine, Clean Catch; Status:Complete;   Done: 01KYZ6538  03:19PM   · Urine Dip Non-Automated- POC; Status:Complete;   Done: 64NUM9758 03:17PM    Future Appointments    Date/Time Provider Specialty Site   65/87/1762 11:01 AM Alexa Melendez, [address] [place], [place] [id number] 04:94 PM [facility]     Signatures   Electronically signed by : Yazmin Reyes DO; Feb 1 0756  7:36PM EST                       (Author)

## 2018-01-11 NOTE — MISCELLANEOUS
Message   Recorded as Task   Date: 05/09/2016 01:22 PM, Created By: Nicole Egan   Task Name: Care Coordination   Assigned To: Mary Hector   Regarding Patient: Diana March, Status: Active   Leigh Ann Connor - 09 May 2016 1:22 PM     TASK CREATED  Please schedule a consultation with Dr Li Later per order in chart  Thank you   Nicole Egan - 09 May 2016 1:23 PM     TASK REASSIGNED: Previously Assigned To Lesvia Chin - 09 May 2016 5:11 PM     TASK REASSIGNED: Previously Assigned To [facility],Team   Nyasia Gauthier - 10 May 2016 12:57 PM     TASK EDITED  PT CALLED AND STATES DR John Mao IS REFERRING HER TO DR Mookie Mirza [address] [place] [address] Syed ArguellesNyasia King - 10 May 2016 1:09 PM     TASK EDITED  PT SCHEDULED WITH DR Compa Peterson 6/6/16 @ 3PM IN [facility] OFFICE  MAILED NPP  TOLD PT TO ARRIVE EARLY  HAD SUCCESSFUL [facility] TRIAL  Active Problems    1  Abdominal bloating (787 3) (R14 0)   2  Abdominal mass, RUQ (right upper quadrant) (789 31) (R19 01)   3  Abdominal pain, RUQ (789 01) (R10 11)   4  Abnormal liver function test (790 6) (R79 89)   5  Atypical chest pain (786 59) (R07 89)   6  Benign essential HTN (401 1) (I10)   7  Bilious vomiting with nausea (787 04) (R11 14)   8  Bowel incontinence (787 60) (R15 9)   9  Celiac disease (579 0) (K90 0)   10  Cervicalgia (723 1) (M54 2)   11  Change in stool habits (787 99) (R19 4)   12  Chest pain (786 50) (R07 9)   13  Chronic pain syndrome (338 4) (G89 4)   14  Cigarette smoker (305 1) (F17 210)   15  Common bile duct dilation (576 8) (K83 8)   16  Compression fracture of L1 lumbar vertebra (805 4) (S32 010A)   17  Dental disorder (525 9) (K08 9)   18  Depression (311) (F32 9)   19  Disc degeneration, 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)   20  Disorder of salivary gland (527 9) (K11 9)   21  Esophageal reflux (530 81) (K21 9)   22  Fibromyalgia (729 1) (M79 7)   23  Gastric ulcer (531 90) (K25 9)   24   Gastroparesis (536 3) (K31 84)   25  Generalized osteoarthritis (715 00) (M15 9)   26  GERD (gastroesophageal reflux disease) (530 81) (K21 9)   27  Hand injury (959 4) (S69 90XA)   28  History of gastric bypass (V45 86) (Z98 89)   29  History of incisional hernia repair (V45 89) (Z98 89)   30  Lumbar stenosis without neurogenic claudication (724 02) (M48 06)   31  Mouth swelling (784 2) (R22 0)   32  Neoplasm of uncertain behavior of skin (238 2) (D48 5)   33  Omphalitis (686 9)   34  Osteoarthrosis of knee (715 36) (M17 9)   35  Osteoporosis (733 00) (M81 0)   36  Other chronic pain (338 29) (G89 29)   37  Other screening mammogram (V76 12) (Z12 31)   38  Peripheral neuropathy (356 9) (G62 9)   39  Postlaminectomy syndrome of lumbar region (722 83) (M96 1)   40  Pre-procedural laboratory examination (V72 63) (Z01 812)   41  Restless legs syndrome (333 94) (G25 81)   42  Screening for colon cancer (V76 51) (Z12 11)   43  Status post surgery (V45 89) (Z98 89)   44  Thyroid disorder (246 9) (E07 9)   45  Tremor (781 0) (R25 1)   46  Urinary incontinence (788 30) (R32)   47  Wrist pain (719 43) (M25 539)    Current Meds   1  Amitriptyline HCl - 25 MG Oral Tablet; Take 1 tablet by mouth at bedtime; Therapy: 88BRX5467 to (Evaluate:27Jfb3228)  Requested for: 82PKD6570; Last   Rx:18Mar2016 Ordered   2  BuPROPion HCl ER (SR) 150 MG Oral Tablet Extended Release 12 Hour; take 1 tablet   by mouth daily; Therapy: 46GJC1428 to (Evaluate:05Jun2016)  Requested for: 89IOT1009; Last   CT:95HBS2757 Ordered   3  Ciprofloxacin HCl - 500 MG Oral Tablet; 1 (one) tablet BID x 7 days; Therapy: 60EWB7951 to (Last WR:10FLY1102)  Requested for: 72QEQ3541 Ordered   4  Diazepam 5 MG Oral Tablet; TAKE 1 TABLET BY MOUTH TWICE DAILY AS NEEDED; Therapy: 05PRX8217 to (Evaluate:04Jun2016)  Requested for: 03OBX2372; Last   YS:42YUU2934 Ordered   5  Dicyclomine HCl - 20 MG Oral Tablet; TAKE 1 TABLET BY MOUTH EVERY 6 HOURS AS   NEEDED;    Therapy: 01CNF7367 to (Evaluate:12Oct2016)  Requested for: 67Psd7468; Last   Rx:01Vfg7334 Ordered   6  FLUoxetine HCl - 10 MG Oral Tablet; take 1 tablet by mouth daily; Therapy: 03HPN5722 to (Sebastian Duffy)  Requested for: 02Apr2016; Last   Rx:02Apr2016 Ordered   7  Gabapentin 600 MG Oral Tablet; TAKE 1 TABLET BY MOUTH 4 TIMES DAILY; Therapy: 10Arv4578 to (Evaluate:11Uhv3525)  Requested for: 85PKC9702; Last   Rx:69Qjo1738 Ordered   8  Lisinopril 10 MG Oral Tablet; take 1 tablet by mouth daily; Therapy: 25Bff7506 to (Evaluate:01Wmr0772)  Requested for: 18Apr2016; Last   Rx:71Zho4593 Ordered   9  Metoprolol Succinate ER 25 MG Oral Tablet Extended Release 24 Hour; take 1 tablet by   mouth daily; Therapy: 04LUM2465 to (Evaluate:07Adl5740)  Requested for: 75NPX9141; Last   JY:60ITT8943 Ordered   10  Oxybutynin Chloride 5 MG Oral Tablet; TAKE 1 TABLET BY MOUTH THREE TIMES    DAILY; Therapy: 59ZCC9120 to (Evaluate:08Rvg9719)  Requested for: 58TXX2683; Last    Rx:09Pgg1054 Ordered   11  Oxycodone-Acetaminophen 5-325 MG Oral Tablet; TAKE ONE TABLET EVERY SIX    HOURS AS NEEDED FOR BREAKTHROUGH PAIN;    Therapy: 76XCA8072 to (Last Rx:30Xhu0931) Ordered   12  OxyCONTIN 40 MG Oral Tablet ER 12 Hour Abuse-Deterrent; TAKE 1 TABLET EVERY    12 HOURS DAILY PRN; Therapy: 48FEU5556 to (Evaluate:04Jun2016); Last BP:04MKP2091 Ordered   13  Pantoprazole Sodium 40 MG Oral Tablet Delayed Release; take 2 tablets by mouth    daily; Therapy: 62DBS3475 to (Sebastian Duffy)  Requested for: 02Apr2016; Last    Rx:39Mcz2009 Ordered   14  Probiotic Oral Capsule; USE AS DIRECTED; Therapy: (Recorded:73Qwe9456) to Recorded   15  Simethicone 125 MG Oral Tablet Chewable; CHEW AND SWALLOW 1 TABLET 4 TIMES    DAILY, AFTER MEALS AND AT BEDTIME; Therapy: 61ZSG4386 to (Evaluate:08Nov2015); Last Rx:09Oct2015 Ordered   16  Super B Complex TABS; TAKE 1 TABLET DAILY AS DIRECTED; Therapy: (Recorded:16Qas6227) to Recorded   17   Vitamin D 2000 UNIT Oral Capsule; Therapy: (Recorded:11Yte7578) to Recorded    Allergies    1  Penicillins   2  Cortisone Acetate TABS    Signatures   Electronically signed by :  Angelo Mckeon, ; May 11 2016  9:37AM EST                       (Author)

## 2018-01-11 NOTE — MISCELLANEOUS
Message  S/w patient that is scheduled for surgery tomorrow, 7/14/17  Verified allergies and went over pre-op instructions, including bathing and NPO status  Patient currently denies taking any blood thinning medications  Answered any questions he may have had at this time  Confirmed pharmacy on file and sent over post-op antibiotic and explained to patient when to start taking it        Signatures   Electronically signed by : Allen Momin RN; Jul 13 2017  9:50AM EST                       (Author)

## 2018-01-12 VITALS
RESPIRATION RATE: 16 BRPM | WEIGHT: 159 LBS | TEMPERATURE: 99.4 F | SYSTOLIC BLOOD PRESSURE: 146 MMHG | HEART RATE: 64 BPM | HEIGHT: 65 IN | DIASTOLIC BLOOD PRESSURE: 74 MMHG | BODY MASS INDEX: 26.49 KG/M2

## 2018-01-12 NOTE — MISCELLANEOUS
Message   Recorded as Task   Date: 03/02/2016 03:47 PM, Created By: Desi Prince   Task Name: Care Coordination   Assigned To: [facility],Team   Regarding Patient: Kasey Galindo, Status: In Progress   Comment:    Lily Tsai - 02 Mar 2016 3:47 PM     TASK CREATED  Pt to be a [facility] SCS trial with Dr Andrez Mcclain  Pt signed release of info  Pt received [facility] brochure, script for psych eval along with list of providers  Pt was given script for thoracic MRI  Pt was scheduled for education with [facility] [address]  Clinical info excluding psych eval has been faxed to [facility] at [address] [place] [address] to begin auth process  Received a request from Dr Johana Bingham at [facility] for [facility] eval script  That has been faxed  Pt does have an allergy to PCN  Lily Tsai - 02 Mar 2016 3:47 PM     TASK IN PROGRESS   Lily Tsai - 31 Mar 2016 9:04 AM     TASK EDITED  Phone call to [facility] to inquire status of pysch eval   Spoke with pt who reports having eval done several weeks ago  Advised pt I am awaitig evalCurlie Loupwilmer - 05 Apr 2016 7:55 AM     TASK EDITED  Received psych eval from   Faxed to Dr Andrez Mcclain for review  Lily Tsai - 05 Apr 2016 9:35 AM     TASK EDITED  Phone call to pt to inform her that we received psych eval, spoke with pt's  Caleb Abrams to make him aware, he states he will pass info along to pt  Lily Tsai - 05 Apr 2016 1:34 PM     TASK EDITED  Signed off psych eval has been faxed to [facility] at [address] [place] [address] to continue with auth process  Lily Tsai - 07 Apr 2016 9:03 AM     TASK EDITED  Received benefit confirmation from [address] [place] [address]  No PA required, pt is ready to be scheduled  Attempt to reach pt to schedule,  for return call     Lily Tsai - 07 Apr 2016 3:54 PM     TASK EDITED  Spoke with pt and she is scheduled as follows:  4/20/16 @ 0930 with Brittnee Ventura to sign consents  5/4/16 arriving @ 1400 for [phone] SCS trial  5/9/16 @ 1330 for lead removal  Email sent to [address] [place] [address] to inform them of trial dates  Pre procedure paperwork complete and sent via inter office mail to [facility] office  Active Problems    1  Abdominal bloating (787 3) (R14 0)   2  Abdominal mass, RUQ (right upper quadrant) (789 31) (R19 01)   3  Abdominal pain, RUQ (789 01) (R10 11)   4  Abnormal liver function test (790 6) (R79 89)   5  Atypical chest pain (786 59) (R07 89)   6  Benign essential HTN (401 1) (I10)   7  Bilious vomiting with nausea (787 04) (R11 14)   8  Bowel incontinence (787 60) (R15 9)   9  Celiac disease (579 0) (K90 0)   10  Cervicalgia (723 1) (M54 2)   11  Change in stool habits (787 99) (R19 4)   12  Chest pain (786 50) (R07 9)   13  Chronic pain syndrome (338 4) (G89 4)   14  Cigarette smoker (305 1) (F17 210)   15  Common bile duct dilation (576 8) (K83 8)   16  Compression fracture of L1 lumbar vertebra (805 4) (S32 010A)   17  Dental disorder (525 9) (K08 9)   18  Depression (311) (F32 9)   19  Disc degeneration, 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)   20  Disorder of salivary gland (527 9) (K11 9)   21  Esophageal reflux (530 81) (K21 9)   22  Fibromyalgia (729 1) (M79 7)   23  Gastric ulcer (531 90) (K25 9)   24  Gastroparesis (536 3) (K31 84)   25  Generalized osteoarthritis (715 00) (M15 9)   26  GERD (gastroesophageal reflux disease) (530 81) (K21 9)   27  Hand injury (959 4) (S69 90XA)   28  History of gastric bypass (V45 86) (Z98 89)   29  History of incisional hernia repair (V45 89) (Z98 89)   30  Lumbar stenosis without neurogenic claudication (724 02) (M48 06)   31  Mouth swelling (784 2) (R22 0)   32  Neoplasm of uncertain behavior of skin (238 2) (D48 5)   33  Omphalitis (686 9)   34  Osteoarthrosis of knee (715 36) (M17 9)   35  Osteoporosis (733 00) (M81 0)   36  Other chronic pain (338 29) (G89 29)   37  Other screening mammogram (V76 12) (Z12 31)   38  Peripheral neuropathy (356 9) (G62 9)   39  Postlaminectomy syndrome of lumbar region (722 83) (M96 1)   40  Pre-procedural laboratory examination (V72 63) (Z01 812)   41  Restless legs syndrome (333 94) (G25 81)   42  Screening for colon cancer (V76 51) (Z12 11)   43  Status post surgery (V45 89) (Z98 89)   44  Thyroid disorder (246 9) (E07 9)   45  Tremor (781 0) (R25 1)   46  Urinary incontinence (788 30) (R32)   47  Wrist pain (719 43) (M25 539)    Current Meds   1  Amitriptyline HCl - 25 MG Oral Tablet; Take 1 tablet by mouth at bedtime; Therapy: 23NZH1132 to (Evaluate:66Mef0136)  Requested for: 70TTD1617; Last   Rx:18Mar2016 Ordered   2  BuPROPion HCl ER (SR) 150 MG Oral Tablet Extended Release 12 Hour; take 1 tablet   by mouth daily; Therapy: 64ZAQ1582 to (Katt Galeas)  Requested for: 16Eba7276; Last   Rx:05Apr2016 Ordered   3  Diazepam 5 MG Oral Tablet; TAKE 1 TABLET BY MOUTH TWICE DAILY AS NEEDED; Therapy: 29MFU5735 to (Evaluate:07Apr2016)  Requested for: 91IDI5701; Last   Rx:08Mar2016 Ordered   4  Dicyclomine HCl - 20 MG Oral Tablet; TAKE 1 TABLET BY MOUTH EVERY 6 HOURS AS   NEEDED; Therapy: 29EYO3169 to (Evaluate:13Apr2016)  Requested for: 43WVF3421; Last   Rx:14Mar2016 Ordered   5  FLUoxetine HCl - 10 MG Oral Tablet; take 1 tablet by mouth daily; Therapy: 02RRK2812 to ([facility])  Requested for: 96Mnl7369; Last   Rx:02Apr2016 Ordered   6  Gabapentin 600 MG Oral Tablet; TAKE 1 TABLET BY MOUTH 4 TIMES DAILY; Therapy: 62Dpm5798 to (Evaluate:39Dis8163)  Requested for: 01HYH8157; Last   Rx:01Mar2016 Ordered   7  Lisinopril 10 MG Oral Tablet; take 1 tablet by mouth daily; Therapy: 55Uqv3961 to (Evaluate:19Lgg7959)  Requested for: 88CHF6847; Last   Rx:13Jan2016 Ordered   8  Metoprolol Succinate ER 25 MG Oral Tablet Extended Release 24 Hour; take 1 tablet by   mouth daily; Therapy: 30TPY2061 to (Evaluate:93Exv0767)  Requested for: 83PZR2034; Last   Rx:09Nov2015 Ordered   9  Morphine Sulfate ER 60 MG Oral Capsule Extended Release 24 Hour; Take 1 capsule   twice daily; Therapy: 07SGC3110 to (Evaluate:01Apr2016); Last Rx:02Mar2016 Ordered   10  Oxybutynin Chloride 5 MG Oral Tablet; TAKE 1 TABLET BY MOUTH THREE TIMES    DAILY; Therapy: 63MBH1048 to (Stephanie Magaña)  Requested for: 39PXF6408; Last    Rx:28Pkx4059 Ordered   11  Oxycodone-Acetaminophen 5-325 MG Oral Tablet; TAKE ONE TABLET EVERY SIX    HOURS AS NEEDED FOR BREAKTHROUGH PAIN;    Therapy: 97ZSS3430 to (Last Rx:18Mar2016) Ordered   12  OxyCONTIN 40 MG Oral Tablet ER 12 Hour Abuse-Deterrent; TAKE 1 TABLET EVERY    12 HOURS DAILY PRN; Therapy: 89PAS0785 to (Evaluate:09Apr2016); Last Rx:10Mar2016 Ordered   13  Pantoprazole Sodium 40 MG Oral Tablet Delayed Release; take 2 tablets by mouth    daily; Therapy: 11ETF8546 to (Maral Banegas)  Requested for: 02Apr2016; Last    Rx:02Apr2016 Ordered   14  Probiotic Oral Capsule; USE AS DIRECTED; Therapy: (Recorded:95Eje1156) to Recorded   15  Simethicone 125 MG Oral Tablet Chewable; CHEW AND SWALLOW 1 TABLET 4 TIMES    DAILY, AFTER MEALS AND AT BEDTIME; Therapy: 42IMA9998 to (Evaluate:08Nov2015); Last Rx:52Ava2001 Ordered   16  Super B Complex TABS; TAKE 1 TABLET DAILY AS DIRECTED; Therapy: (Recorded:26Gbg8555) to Recorded   17  Vitamin D 2000 UNIT Oral Capsule; Therapy: (Recorded:39Aod4615) to Recorded    Allergies    1  Penicillins   2  Cortisone Acetate TABS    Plan  Pre-procedural laboratory examination    · (1) APTT; Status:Active - Retrospective Authorization; Requested for:07Apr2016;    · (1) CBC/PLT/DIFF; Status:Active - Retrospective Authorization; Requested  for:07Apr2016;    · (1) COMPREHENSIVE METABOLIC PANEL; Status:Active - Retrospective Authorization; Requested for:07Apr2016;    · (1) PT WITH INR; Status:Active - Retrospective Authorization; Requested for:07Apr2016;   Status post surgery    · Ciprofloxacin HCl - 500 MG Oral Tablet; 1 (one) tablet BID x 7 days   · XR SPINE THORACIC 2 VIEW; Status:Active - Retrospective Authorization;  Requested  for:07Apr2016;     Signatures   Electronically signed by : Sreekanth Villa Marinaela Presser, ; Apr 7 2016  4:10PM EST                       (Author)

## 2018-01-12 NOTE — MISCELLANEOUS
Message   Recorded as Task   Date: 03/30/2016 11:44 AM, Created By: Shaq Ashley   Task Name: Follow Up   Assigned To: [facility],Team   Regarding Patient: Elvis Duke, Status: Active   Comment:    Shaq Ashley - 30 Mar 2016 11:44 AM     TASK CREATED  Caller: Self; General Medical Question; [phone] (Home)  Pt left vm 3/30 at 10:55 stating she thinks she may need to make an ov with Dr Liat Chung, she said she went for MRI on 3/16 and she doesn't know if she needs other testing done before seeing him or if there is an appt already scheduled for her? Pt's Thoracic MRI report is in allscripts from 3/16/16  Pt has no pending povs   Please advise how to proceed  Gita Newby - 30 Mar 2016 11:54 AM     TASK REPLIED TO: Previously Assigned To [address] [place] [address] clinical,Team  MRI looks appropriate, she should pursue psych eval if she has not done that yet and do the education with the spinal cord stim reps  she is being evaluated for stim so this is the typical process  Alyse Solorio - 30 Mar 2016 4:39 PM     TASK EDITED   Alyse Solorio - 30 Mar 2016 4:41 PM     TASK EDITED    I spoke with pt, advised above  Pt states she did have psych eval and education  I advised that most likely we are waiting on auth from the insurance comp  Advised I will get message to Tabitha Mcclelland and she will CB with update  Pt thankful  *************   Lily Tsai - 31 Mar 2016 8:36 AM     TASK EDITED  LM with GS to inquire about psych eval    Lily Tsai - 31 Mar 2016 9:05 AM     TASK EDITED  Spoke to pt inform her of below  Active Problems    1  Abdominal bloating (787 3) (R14 0)   2  Abdominal mass, RUQ (right upper quadrant) (789 31) (R19 01)   3  Abdominal pain, RUQ (789 01) (R10 11)   4  Abnormal liver function test (790 6) (R79 89)   5  Atypical chest pain (786 59) (R07 89)   6  Benign essential HTN (401 1) (I10)   7  Bilious vomiting with nausea (787 04) (R11 14)   8   Bowel incontinence (787 60) (R15 9)   9  Celiac disease (579 0) (K90 0)   10  Cervicalgia (723 1) (M54 2)   11  Change in stool habits (787 99) (R19 4)   12  Chest pain (786 50) (R07 9)   13  Chronic pain syndrome (338 4) (G89 4)   14  Cigarette smoker (305 1) (F17 210)   15  Common bile duct dilation (576 8) (K83 8)   16  Compression fracture of L1 lumbar vertebra (805 4) (S32 010A)   17  Dental disorder (525 9) (K08 9)   18  Depression (311) (F32 9)   19  Disc degeneration, 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)   20  Disorder of salivary gland (527 9) (K11 9)   21  Esophageal reflux (530 81) (K21 9)   22  Fibromyalgia (729 1) (M79 7)   23  Gastric ulcer (531 90) (K25 9)   24  Gastroparesis (536 3) (K31 84)   25  Generalized osteoarthritis (715 00) (M15 9)   26  GERD (gastroesophageal reflux disease) (530 81) (K21 9)   27  Hand injury (959 4) (S69 90XA)   28  History of gastric bypass (V45 86) (Z98 89)   29  History of incisional hernia repair (V45 89) (Z98 89)   30  Lumbar stenosis without neurogenic claudication (724 02) (M48 06)   31  Mouth swelling (784 2) (R22 0)   32  Neoplasm of uncertain behavior of skin (238 2) (D48 5)   33  Omphalitis (686 9)   34  Osteoarthrosis of knee (715 36) (M17 9)   35  Osteoporosis (733 00) (M81 0)   36  Other chronic pain (338 29) (G89 29)   37  Other screening mammogram (V76 12) (Z12 31)   38  Peripheral neuropathy (356 9) (G62 9)   39  Postlaminectomy syndrome of lumbar region (722 83) (M96 1)   40  Restless legs syndrome (333 94) (G25 81)   41  Screening for colon cancer (V76 51) (Z12 11)   42  Thyroid disorder (246 9) (E07 9)   43  Tremor (781 0) (R25 1)   44  Urinary incontinence (788 30) (R32)   45  Wrist pain (719 43) (M25 539)    Current Meds   1  Amitriptyline HCl - 25 MG Oral Tablet; Take 1 tablet by mouth at bedtime; Therapy: 42XSN1011 to (Evaluate:37Tkh1495)  Requested for: 90DXD2036; Last   Rx:18Mar2016 Ordered   2   BuPROPion HCl ER (SR) 150 MG Oral Tablet Extended Release 12 Hour; take 1 tablet   by mouth daily; Therapy: 60FVG1827 to (Jose Morales)  Requested for: 61NTH2817; Last   Rx:09Oct2015 Ordered   3  Diazepam 5 MG Oral Tablet; TAKE 1 TABLET BY MOUTH TWICE DAILY AS NEEDED; Therapy: 24EQF2134 to (Evaluate:07Apr2016)  Requested for: 29ZCK1624; Last   Rx:08Mar2016 Ordered   4  Dicyclomine HCl - 20 MG Oral Tablet; TAKE 1 TABLET BY MOUTH EVERY 6 HOURS AS   NEEDED; Therapy: 18OSA4797 to (Evaluate:13Apr2016)  Requested for: 42PEG1006; Last   Rx:14Mar2016 Ordered   5  FLUoxetine HCl - 10 MG Oral Tablet; TAKE 1 TABLET DAILY; Therapy: 38UFO4934 to (Jose Morales)  Requested for: 67NPC8976; Last   Rx:04Jan2016 Ordered   6  Gabapentin 600 MG Oral Tablet; TAKE 1 TABLET BY MOUTH 4 TIMES DAILY; Therapy: 48Qwk3334 to (Evaluate:55Pby5263)  Requested for: 51RES5280; Last   Rx:01Mar2016 Ordered   7  Lisinopril 10 MG Oral Tablet; take 1 tablet by mouth daily; Therapy: 06Zia4777 to (Evaluate:12Apr2016)  Requested for: 45YNV3766; Last   Rx:13Jan2016 Ordered   8  Metoprolol Succinate ER 25 MG Oral Tablet Extended Release 24 Hour; take 1 tablet by   mouth daily; Therapy: 76NJB9988 to (Evaluate:17Fxv5135)  Requested for: 19OPG6394; Last   Rx:09Nov2015 Ordered   9  Morphine Sulfate ER 60 MG Oral Capsule Extended Release 24 Hour; Take 1 capsule   twice daily; Therapy: 25YLJ9697 to (Evaluate:01Apr2016); Last Rx:02Mar2016 Ordered   10  Oxybutynin Chloride 5 MG Oral Tablet; TAKE 1 TABLET BY MOUTH THREE TIMES    DAILY; Therapy: 49ZWO6703 to (Biju Stevens)  Requested for: 08QHT4261; Last    Rx:09Feb2016 Ordered   11  Oxycodone-Acetaminophen 5-325 MG Oral Tablet; TAKE ONE TABLET EVERY SIX    HOURS AS NEEDED FOR BREAKTHROUGH PAIN;    Therapy: 50YGB7668 to (Last Rx:18Mar2016) Ordered   12  OxyCONTIN 40 MG Oral Tablet ER 12 Hour Abuse-Deterrent; TAKE 1 TABLET EVERY    12 HOURS DAILY PRN; Therapy: 62SLJ2735 to (Evaluate:09Apr2016); Last Rx:10Mar2016 Ordered   13   Pantoprazole Sodium 40 MG Oral Tablet Delayed Release; take 2 tablets by mouth    daily; Therapy: 43PQV7547 to (Marjan Borges)  Requested for: 56VQO7849; Last    Rx:67Igw3626 Ordered   14  Probiotic Oral Capsule; USE AS DIRECTED; Therapy: (Recorded:81Oun1069) to Recorded   15  Simethicone 125 MG Oral Tablet Chewable; CHEW AND SWALLOW 1 TABLET 4 TIMES    DAILY, AFTER MEALS AND AT BEDTIME; Therapy: 41TPI5309 to (Evaluate:08Nov2015); Last Rx:09Oct2015 Ordered   16  Super B Complex TABS; TAKE 1 TABLET DAILY AS DIRECTED; Therapy: (Recorded:64Pat0675) to Recorded   17  Vitamin D 2000 UNIT Oral Capsule; Therapy: (Recorded:41Qps9658) to Recorded    Allergies    1  Penicillins   2   Cortisone Acetate TABS    Signatures   Electronically signed by : Niesha Cortes, ; Mar 31 2016  9:05AM EST                       (Author)

## 2018-01-12 NOTE — PROGRESS NOTES
Assessment    1  Aftercare following surgery (V58 89) (Z48 89)    Discussion/Summary    Very pleasant 80-year-old female, returns for two-week follow-up, status post left buttocks IPG removal  Patient overall pleased with surgical outcome  Wound care was reviewed with the patient, specifically she is to observe for redness, swelling, increased pain, drainage or fever, should these be observed she understands she is too call and/or return immediately for reassessment  Additionally patient understands she may shower, wash with soap and water, pat wound dry, she also understands she is to avoid immersion in water such as swimming, hot tub use or tub bath  May resume immersion in water in approximately 2 weeks  Activities, may gradually return to all usual activities without restriction, ambulation is encouraged  Further follow-up with neurosurgery will be on an as-needed basis  She understands continue follow with her usual pain management provider, her PCP Dr Chai Damian  These findings, impressions and recommendations are reviewed in great detail with the patient, she expressed understanding and agreement, her questions were answered completely and to her satisfaction  The patient was counseled regarding instructions for management, patient and family education, importance of compliance with treatment  The patient has the current Goals: Vaginal returned all usual activities  Patient is able to Self-Care  Chief Complaint  Postoperative follow-up, 2 weeks, status post removal left buttocks IPG      Post-Op  Post-Op Lumbar/Sacral Spine:   Lazaro Foley is status post on 7/14/2017   1  Removal of a left back implantable pulse generator  History of Present Illness: The patient reports fever, back pain, lower extremity weakness, lower extremity numbness, abnormal bowel and bladder function, lower extremity pain and walking/standing/sitting intolerance, but no nausea     Physical Examination: Surgical incision site is clean, dry and intact (the incision site was intact, had intact staples and had no drainage )  The surrounding skin findings included normal appearance and Trace of erythema noted puncture sites from the surgical clips, no lindsey signs of infection, drainage, pain or erythema  Evaluation of the back demonstrates no warmth, no erythema, no swelling, no induration, no ecchymosis and no tenderness  The gait was wide-based ataxic and shuffling  Lower Extremity DVT Assessment: no signs or symptoms suggestive of deep vein thrombosis, no calf swelling and no palpable cord in calf  Assessment:   Post-op, the patient is doing well, with good pain control and without signs of an infection  Plan: To Do For Next Visit:  Further follow-up as needed  Review of Systems    Constitutional: fever and feeling tired  Eyes: No complaints of eye pain, no red eyes, no eyesight problems, no discharge, no dry eyes, no itching of eyes  ENT: no complaints of earache, no loss of hearing, no nose bleeds, no nasal discharge, no sore throat, no hoarseness  Cardiovascular: No complaints of slow heart rate, no fast heart rate, no chest pain, no palpitations, no leg claudication, no lower extremity edema  Respiratory: No complaints of shortness of breath, no wheezing, no cough, no SOB on exertion, no orthopnea, no PND  Gastrointestinal: No complaints of abdominal pain, no constipation, no nausea or vomiting, no diarrhea, no bloody stools  Genitourinary: incontinence  Musculoskeletal: arthralgias, joint swelling and joint stiffness  Integumentary: skin lesion  Neurological: headache, numbness, tingling, difficulty walking and tingling in feet  Psychiatric: depression  Endocrine: No complaints of proptosis, no hot flashes, no muscle weakness, no deepening of the voice, no feelings of weakness  Hematologic/Lymphatic: a tendency for easy bruising  ROS reviewed        Active Problems    1  Abdominal bloating (787 3) (R14 0)   2  Benign essential HTN (401 1) (I10)   3  Celiac disease (579 0) (K90 0)   4  Cervicalgia (723 1) (M54 2)   5  Chronic pain syndrome (338 4) (G89 4)   6  Cigarette smoker (305 1) (F17 210)   7  Common bile duct dilation (576 8) (K83 8)   8  Compression fracture of L1 lumbar vertebra (805 4) (S32 010A)   9  Depression (311) (F32 9)   10  Disc degeneration, 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)   11  Elevated liver enzymes (790 5) (R74 8)   12  Fibromyalgia (729 1) (M79 7)   13  Generalized osteoarthritis (715 00) (M15 9)   14  GERD (gastroesophageal reflux disease) (530 81) (K21 9)   15  History of gastric bypass (V45 86) (Z98 890)   16  Lumbar stenosis without neurogenic claudication (724 02) (M48 06)   17  Oral thrush (112 0) (B37 0)   18  Osteoarthrosis of knee (715 36) (M17 10)   19  Osteoporosis (733 00) (M81 0)   20  Peripheral neuropathy (356 9) (G62 9)   21  Postlaminectomy syndrome of lumbar region (722 83) (M96 1)   22  Postprocedural state (V45 89) (Z98 890)   23  Pre-op evaluation (V72 84) (Z01 818)   24  Pre-procedural laboratory examination (V72 63) (Z01 812)   25  Restless legs syndrome (333 94) (G25 81)   26  Screening for colon cancer (V76 51) (Z12 11)   27  Thyroid disorder (246 9) (E07 9)    Social History    · Denied: History of Alcohol use   · Current Every Day Smoker (305 1)   · Denied: History of Drug use   · Social alcohol use (Z78 9)    Current Meds   1  AmLODIPine Besylate 2 5 MG Oral Tablet; take 1 tablet by mouth daily; Therapy: 54ADB9310 to (Evaluate:37Hdr5334)  Requested for: 49QFR6889; Last   Rx:15Pss8121 Ordered   2  BuPROPion HCl ER (SR) 150 MG Oral Tablet Extended Release 12 Hour; take 1 tablet by   mouth daily; Therapy: 88UYR5125 to (77 858 093)  Requested for: 28Jul2017; Last   Rx:28Jul2017; Status: ACTIVE - Retrospective By Protocol Authorization Ordered   3  Calcium CAPS; Therapy: (Recorded:05May2017) to Recorded   4  Clotrimazole 10 MG Mouth/Throat Lozenge; ALLOW 1 BELEM TO SLOWLY DISSOLVE   IN MOUTH 5 times a day; Therapy: 05MNZ5796 to (Last Rx:78Yfl7513)  Requested for: 07Cml9134 Ordered   5  DiazePAM 5 MG Oral Tablet; TAKE 1 TABLET BY MOUTH TWICE DAILY AS NEEDED; Therapy: 92VFP8704 to (Evaluate:57Arw6734)  Requested for: 72JYW2172; Last   Rx:03Vjy2633 Ordered   6  Dicyclomine HCl - 20 MG Oral Tablet; TAKE 1 TABLET BY MOUTH EVERY 6 HOURS AS   NEEDED; Therapy: 99LBL8823 to (Evaluate:18Oct2017)  Requested for: 59Cwg6422; Last   Rx:98Eps4064 Ordered   7  FentaNYL 25 MCG/HR Transdermal Patch 72 Hour; apply 1 patch every 72 hours; Therapy: 09SBR1814 to (Evaluate:13Paz6819); Last Rx:98Vob9512 Ordered   8  FLUoxetine HCl - 10 MG Oral Tablet; take 1 tablet by mouth daily; Therapy: 11YVC8799 to (Evaluate:59Aex5545)  Requested for: 18TYN5578; Last   Rx:05Mvz5959 Ordered   9  Gabapentin 600 MG Oral Tablet; TAKE 1 TABLET BY MOUTH FIVE TIMES DAILY; Therapy: 78Nuh7173 to (Evaluate:08Oct2017)  Requested for: 16NFP4887; Last   Rx:74Wef0197 Ordered   10  Hair/Skin/Nails TABS; Therapy: (Recorded:03Rji3177) to Recorded   11  Levothyroxine Sodium 25 MCG Oral Tablet; take 1 tablet by mouth daily; Therapy: 51IKZ4939 to (Donna Jimenez)  Requested for: 53Sjp9327; Last    Rx:68Ljt6527 Ordered   12  Lisinopril 10 MG Oral Tablet; take 1 tablet by mouth daily; Therapy: 41Eyn7028 to (Evaluate:08Oct2017)  Requested for: 45WUO5593; Last    Rx:22Rlw7009 Ordered   13  Metoprolol Succinate ER 25 MG Oral Tablet Extended Release 24 Hour; take 1 tablet by    mouth every day; Therapy: 33MTJ1874 to (Evaluate:76Zcd1350)  Requested for: 86TPY7509; Last    Rx:84Ktz7618 Ordered   14  Mupirocin 2 % External Ointment; APPLY SPARINGLY EXTERNALLY TO THE AFFECTED    AREA 2 TO 3 TIMES DAILY; Therapy: 89BRT8241 to (Evaluate:05Apr2017)  Requested for: 14Apr2017; Last    Rx:26Mar2017 Ordered   15   Oxybutynin Chloride 5 MG Oral Tablet; TAKE 1 TABLET BY MOUTH THREE TIMES DAILY; Therapy: 50ZPZ7267 to (Evaluate:90Pxa7482)  Requested for: 45PFJ4957; Last    ST:38AFW9904 Ordered   16  Oxycodone-Acetaminophen 5-325 MG Oral Tablet; take 1 to 2 tablets every 4 hours as    needed for pain; Therapy: 99NKU4222 to (Last Rx:21Wwx2559) Ordered   17  Oxycodone-Acetaminophen 5-325 MG Oral Tablet; TAKE ONE TABLET EVERY SIX    HOURS AS NEEDED FOR BREAKTHROUGH PAIN;    Therapy: 30Yzw8171 to (Last Rx:84Soi4677) Ordered   18  Pantoprazole Sodium 40 MG Oral Tablet Delayed Release; take 2 tablets by mouth daily; Therapy: 30UAI0584 to (Evaluate:51Hcm4674)  Requested for: 34CPN8958; Last    Rx:12Rqs0002 Ordered   19  Simethicone 125 MG Oral Tablet Chewable; CHEW AND SWALLOW 1 TABLET 4 TIMES    DAILY, AFTER MEALS AND AT BEDTIME; Therapy: 21EPG0136 to (Evaluate:08Nov2015); Last Rx:39Lrq2441 Ordered   20  Sucralfate 1 GM Oral Tablet; TAKE 1 TABLET BY MOUTH FOUR TIMES DAILY; Therapy: 75UMI9065 to (Evaluate:95Xft7639)  Requested for: 10KHZ0893; Last    Rx:67Vzd9222 Ordered   21  Super B Complex TABS; TAKE 1 TABLET DAILY AS DIRECTED; Therapy: (Recorded:95Ayn7154) to Recorded   22  Vitamin D 2000 UNIT Oral Capsule; Therapy: (Recorded:57Ovw0552) to Recorded    Allergies    1  Morphine Derivatives   2  Penicillins   3  Cortisone Acetate TABS   4  Morphine Sulfate TABS    5  Adhesive Tape    Vitals   Recorded: 28Jul2017 01:58PM   Temperature 99 4 F, Tympanic   Heart Rate 64, L Radial   Respiration 16   Systolic 377, LUE, Sitting   Diastolic 74, LUE, Sitting   Height 5 ft 5 in   Weight 159 lb    BMI Calculated 26 46   BSA Calculated 1 79   Pain Scale 6     Physical Exam     Respiratory Respiratory effort: Normal   Auscultation of lungs: Clear to auscultation bilaterally  Cardiovascular Auscultation of heart: Rate is regular  Rhythm is regular  No heart murmur appreciated  Procedure  Procedure: suture removal  The wound was located on the left buttock   The wound was 4 cm in length  Wound Exam: well healed with no sign of infection  Procedure Note: 9 staples were removed  Patient Status:  the patient tolerated the procedure well  Complications:  there were no complications        Future Appointments    Date/Time Provider Specialty Site   14/10/4398 77:42 PM Dee Dee Calderon, DO Family Medicine [facility]     Signatures   Electronically signed by : Michelle Markham [facility]; Jul 28 2017  2:09PM EST                       (Author)    Electronically signed by : BARRY Marcos ; Jul 31 2017 12:12PM EST                       (Author)

## 2018-01-12 NOTE — MISCELLANEOUS
Message  S/w pt on telephone POD 3  She reports that she is doing very well overall and she denies any incisional issues or fevers  She has some incisional soreness, which she expected  Verified date/time/location of her upcoming POV and advised her to call the office with any further questions or concerns, or if any incisional issues or fevers would arise  Went over incision care with patient, which she has been compliant with, and she had no further questions at this time  She was appreciative for the call        Signatures   Electronically signed by : Eugena Claude, RN; Jul 17 2017  2:37PM EST                       (Author)

## 2018-01-12 NOTE — MISCELLANEOUS
Assessment    1  UTI symptoms (788 99) (R39 9)   2  Hospital-acquired pneumonia (486) (J18 9)   3  Sepsis, due to unspecified organism (038 9,995 91) (A41 9)   4  Transient atrial fibrillation (427 31) (I48 91)    Plan  Benign essential HTN    · Metoprolol Succinate ER 25 MG Oral Tablet Extended Release 24 Hour; take one  tablet by mouth one time daily   Rx By: Annalise Egan; Dispense: 90 Days ; #:90 Tablet Extended Release 24 Hour; Refill: 0; For: Benign essential HTN; BRADLEY = N; Record  Benign essential HTN, Other chronic pain    · Stop: Metoprolol Succinate ER 50 MG Oral Tablet Extended Release 24 Hour   Rx By: Annalise Egan; Dispense: 90 Days ; #:90 Tablet Extended Release 24 Hour; Refill: 2; For: Benign essential HTN, Other chronic pain; BRADLEY = N; Verified Transmission to [address] [place] [address]  Encounter for screening mammogram for malignant neoplasm of breast    · * MAMMO SCREENING BILATERAL W CAD; Status:Hold For - Scheduling,Retrospective  By Protocol Authorization; Requested DNW:[id number];    Perform:[facility]; HLA:04UVU9079; Last Updated Gema Crump; 1/13/2017 10:50:57 AM;Ordered;  For:Encounter for screening mammogram for malignant neoplasm of breast; Ordered By:Pryblick, Newman Cranker;  Oral thrush    · Clotrimazole 10 MG Mouth/Throat Nettie   Rx By: Annalise Egan; Dispense: 25 Days ; #:25 Nettie; Refill: 0; For: Oral thrush; BRADLEY = N; Verified Transmission to [address] [place] [address]; Last Updated By: Antony Lopez; 1/13/2017 10:44:48 AM  UTI symptoms    · Ciprofloxacin HCl - 250 MG Oral Tablet; Take 1 tablet twice daily   Rx By: Annalise Egan; Dispense: 7 Days ; #:14 Tablet; Refill: 0; For: UTI symptoms; BRADLEY = N; Verified Transmission to [address] [place] [address]; Last Updated By: System, [employer]; 1/13/2017 11:34:13 AM   · (1) URINE CULTURE; Source:Urine, Clean Catch; Status: In Progress - Specimen/Data  Collected,Retrospective By Protocol Authorization;   Done: 64ZVL7937 Perform:[facility] [employer]; [facility]:88QNP3138; Last Updated Bernice Alfredo; 1/13/2017 11:03:56 AM;Ordered; For:UTI symptoms; Ordered By:Adalberto Richardson;   · Urine Dip Non-Automated- POC; Status:Complete - Retrospective By Protocol  Authorization;   Done: 08ZDF3226 10:59AM   Performed: In Office; [id number]; Last Updated Bernice Alfredo; 1/13/2017 11:00:09 AM;Ordered; For:UTI symptoms; Ordered By:Adalberto Richardson;    Chief Complaint  Chief Complaint Free Text Note Form: pt here today for a PINKY from [place]  pt was in hospital from 12/23/2016-12/28/2016 with a wound infection as well as pneumonia  pt is not sure if she still has pneumonia or not  also here today with c/o UTI sx  Had wound VAC  Discontinued on Wednesday  History of Present Illness  TCM Communication [facility]: The patient is being contacted for follow-up after hospitalization and 01/13/2016  She was hospitalized at [facility]  The date of admission: 12/23/2016, date of discharge: 12/28/2016  Diagnosis: wound infection  She was discharged to home  She scheduled a follow up appointment  Symptoms: weakness and fatigue  The patient is currently experiencing symptoms  pneumonia Counseling was provided to the patient  Communication performed and completed by Princess Osborn      Active Problems    1  Abdominal bloating (787 3) (R14 0)   2  Abdominal mass, RUQ (right upper quadrant) (789 31) (R19 01)   3  Abdominal pain, RUQ (789 01) (R10 11)   4  Abnormal liver function test (790 6) (R79 89)   5  Aftercare following surgery (V58 89) (Z48 89)   6  Atypical chest pain (786 59) (R07 89)   7  Benign essential HTN (401 1) (I10)   8  Bilious vomiting with nausea (787 04) (R11 14)   9  Celiac disease (579 0) (K90 0)   10  Cervicalgia (723 1) (M54 2)   11  Change in stool habits (787 99) (R19 4)   12  Chest pain (786 50) (R07 9)   13  Chronic pain syndrome (338 4) (G89 4)   14   Cigarette smoker (305 1) (F18 667) 15  Common bile duct dilation (576 8) (K83 8)   16  Compression fracture of L1 lumbar vertebra (805 4) (S32 010A)   17  Dental disorder (525 9) (K08 9)   18  Depression (311) (F32 9)   19  Disc degeneration, 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)   20  Disorder of salivary gland (527 9) (K11 9)   21  Elevated liver enzymes (790 5) (R74 8)   22  Encounter for removal of staples (V58 32) (Z48 02)   23  Encounter for screening for malignant neoplasm of colon (V76 51) (Z12 11)   24  Esophageal reflux (530 81) (K21 9)   25  Fibromyalgia (729 1) (M79 7)   26  Gastric ulcer (531 90) (K25 9)   27  Gastroparesis (536 3) (K31 84)   28  Generalized osteoarthritis (715 00) (M15 9)   29  GERD (gastroesophageal reflux disease) (530 81) (K21 9)   30  Hand injury (959 4) (S69 90XA)   31  History of gastric bypass (V45 86) (Z98 890)   32  History of incisional hernia repair (V45 89) (Z98 890,Z87 19)   33  Lumbar stenosis without neurogenic claudication (724 02) (M48 06)   34  Mouth swelling (784 2) (R22 0)   35  Neoplasm of uncertain behavior of skin (238 2) (D48 5)   36  Omphalitis (686 9)   37  Oral thrush (112 0) (B37 0)   38  Osteoarthrosis of knee (715 36) (M17 9)   39  Osteoporosis (733 00) (M81 0)   40  Other chronic pain (338 29) (G89 29)   41  Other screening mammogram (V76 12) (Z12 31)   42  Peripheral neuropathy (356 9) (G62 9)   43  Postlaminectomy syndrome of lumbar region (722 83) (M96 1)   44  Postprocedural state (V45 89) (Z98 890)   45  Pre-op evaluation (V72 84) (Z01 818)   46  Pre-procedural laboratory examination (V72 63) (Z01 812)   47  Restless legs syndrome (333 94) (G25 81)   48  Screening for colon cancer (V76 51) (Z12 11)   49  Status post surgery (V45 89) (Z98 890)   50  Thyroid disorder (246 9) (E07 9)   51  Tremor (781 0) (R25 1)   52  Urinary incontinence (788 30) (R32)   53  Visit for pre-operative examination (V72 84) (Z01 818)   54  Wrist pain (719 43) (M25 539)    Past Medical History    1   History of Abdominal pain, epigastric (789 06) (R10 13)   2  History of Abdominal pain, RUQ (789 01) (R10 11)   3  History of Anemia (285 9) (D64 9)   4  History of Asthma (493 90) (J45 909)   5  History of Asymptomatic menopausal state (V49 81) (Z78 0)   6  History of Backache (724 5) (M54 9)   7  History of Bowel incontinence (787 60) (R15 9)   8  History of Cigarette smoker (305 1) (F17 210)   9  History of Cough (786 2) (R05)   10  History of Galactorrhea With Normal Prolactins (611 6)   11  History of Gastritis (535 50) (K29 70)   12  History of abdominal pain (V13 89) (Z87 898)   13  History of diabetes mellitus (V12 29) (Z86 39)   14  History of diarrhea (V12 79) (Z87 898)   15  History of malignant neoplasm (V10 90) (Z85 9)   16  History of osteopenia (V13 59) (Z87 39)   17  History of thyroid disease (V12 29) (Z86 39)   18  History of upper respiratory infection (V12 09) (Z87 09)   19  History of Hyperlipidemia (272 4) (E78 5)   20  History of Hypoglycemia (251 2) (E16 2)   21  History of Incisional hernia (553 21) (K43 2)   22  History of Lymphadenopathy (785 6) (R59 1)   23  History of Malabsorption syndrome (579 9) (K90 9)   24  History of Need for influenza vaccination (V04 81) (Z23)   25  History of Obesity (278 00) (E66 9)   26  History of Pre-procedural examination (V72 84) (Z01 818)   27  History of Upper respiratory infection (465 9) (J06 9)    Surgical History    1  History of Appendectomy   2  History of Back Surgery   3  History of  Section   4  History of Gastric Surgery For Morbid Obesity Gastric Bypass   5  History of Hernia Repair   6  History of Knee Surgery   7  History of Tubal Ligation   8  History of Wrist Surgery    Family History  Mother    1  Family history of diabetes mellitus (V18 0) (Z83 3)   2  Family history of hypertension (V17 49) (Z82 49)   3  Family history of Retinal disease  Father    4  Family history of lung cancer (V16 1) (Z80 1)   5   Family history of malignant neoplasm of brain (V16 8) (Z80 8)   6  Family history of renal failure (V18 69) (Z84 1)  Family History    7  Family history of cardiac disorder (V17 49) (Z82 49)   8  Family history of diabetes mellitus (V18 0) (Z83 3)   9  Family history of hypertension (V17 49) (Z82 49)   10  Family history of myocardial infarction (V17 3) (Z82 49)   11  Family history of High cholesterol    Social History    · Denied: History of Alcohol use   · Current Every Day Smoker (305 1)   · Denied: History of Drug use   · Social alcohol use (Z78 9)    Current Meds   1  AmLODIPine Besylate 2 5 MG Oral Tablet; TAKE 1 TABLET DAILY; Therapy: 64SYI0911 to (Evaluate:21May2017)  Requested for: 15LHN6907; Last   Rx:22Nov2016 Ordered   2  BuPROPion HCl ER (SR) 150 MG Oral Tablet Extended Release 12 Hour; take 1 tablet by   mouth daily; Therapy: 91EHQ1437 to (Evaluate:30Jan2017)  Requested for: 59FGD4710; Last   Rx:01Nov2016 Ordered   3  Clotrimazole 10 MG Mouth/Throat [place]; ALLOW 1 BELEM TO SLOWLY DISSOLVE IN   MOUTH 5 times a day; Therapy: 46SUX2288 to (Evaluate:19Alw5148)  Requested for: 07Pje6401; Last   Rx:40Lvi3069 Ordered   4  DiazePAM 5 MG Oral Tablet; TAKE 1 TABLET BY MOUTH TWICE DAILY AS NEEDED; Therapy: 14DHX1517 to (Evaluate:18Jan2017)  Requested for: 49Hrd0596; Last   Rx:38Ggo7937 Ordered   5  Dicyclomine HCl - 20 MG Oral Tablet; TAKE 1 TABLET BY MOUTH EVERY 6 HOURS AS   NEEDED; Therapy: 17PZN6922 to (Evaluate:24Apr2017)  Requested for: 29Qyg7368; Last   Rx:26Oct2016 Ordered   6  FLUoxetine HCl - 10 MG Oral Tablet; take 1 tablet by mouth daily; Therapy: 15AOK9816 to (Evaluate:19Mar2017)  Requested for: 22Uxt5035; Last   Rx:60Hwq2333 Ordered   7  Gabapentin 600 MG Oral Tablet; TAKE 1 TABLET BY MOUTH 4 TIMES DAILY; Therapy: 23Tng5607 to (Evaluate:63Rtz1301)  Requested for: 23NRO0740; Last   Rx:21Nov2016 Ordered   8  Levothyroxine Sodium 25 MCG Oral Tablet; take 1 tablet by mouth daily;    Therapy: 38NJT4034 to  Requested for: 11Aug2016; Last   Rx:32Skc7216 Ordered   9  Lisinopril 10 MG Oral Tablet; take 1 tablet by mouth daily; Therapy: 16Eqa4266 to (Evaluate:10Jan2017)  Requested for: 22Oct2016; Last   Rx:12Oct2016 Ordered   10  Metoprolol Succinate ER 50 MG Oral Tablet Extended Release 24 Hour; TAKE 1 TABLET    DAILY; Therapy: 54XTH7224 to (Evaluate:23Sgy1675)  Requested for: 38Tzk9901; Last    Rx:98Zoo5626 Ordered   11  Oxybutynin Chloride 5 MG Oral Tablet; TAKE 1 TABLET BY MOUTH THREE TIMES DAILY; Therapy: 78OZW6407 to (Evaluate:31Jan2017)  Requested for: 71KUY4931; Last    Rx:02Nov2016 Ordered   12  Oxycodone-Acetaminophen 5-325 MG Oral Tablet; TAKE ONE TABLET EVERY SIX    HOURS AS NEEDED FOR BREAKTHROUGH PAIN;    Therapy: 94Ktb1306 to (Last Rx:03Upo9835) Ordered   13  OxyCONTIN 20 MG Oral Tablet ER 12 Hour Abuse-Deterrent; TAKE 1 TABLET EVERY 12    HOURS DAILY; Therapy: 08IFJ7519 to (Evaluate:18Jan2017); Last Rx:81Zjf2670 Ordered   14  Pantoprazole Sodium 40 MG Oral Tablet Delayed Release; take 2 tablets by mouth daily; Therapy: 04ZVQ1993 to (Evaluate:32Dej3188)  Requested for: 19SDZ2499; Last    Rx:22Nov2016 Ordered   15  Simethicone 125 MG Oral Tablet Chewable; CHEW AND SWALLOW 1 TABLET 4 TIMES    DAILY, AFTER MEALS AND AT BEDTIME; Therapy: 11LES5670 to (Evaluate:08Nov2015); Last Rx:09Oct2015 Ordered   16  Super B Complex TABS; TAKE 1 TABLET DAILY AS DIRECTED; Therapy: (Recorded:13Tac7055) to Recorded   17  Vitamin D 2000 UNIT Oral Capsule; Therapy: (Recorded:56Xcf3764) to Recorded    Allergies    1  Penicillins   2   Cortisone Acetate TABS    Vitals  Signs   Recorded: 56AEP7652 46:01WK   Systolic: 625  Diastolic: 70  Recorded: 19WDQ6307 10:52AM   Heart Rate: 78  O2 Saturation: 92  Recorded: 56EMW4351 10:44AM   Height: 5 ft 5 in  Weight: 147 lb 2 08 oz  BMI Calculated: 24 48  BSA Calculated: 1 74    Results/Data  Urine Dip Non-Automated- POC 22CKW9194 04:99NADIA Valenzuela     Test Name Result Flag Reference   Color Valerie     Clarity Cloudy     Leukocytes +     Nitrite +     Blood +++     Bilirubin +     Urobilinogen 0 2     Protein +++     Ph 5 0     Specific Gravity 1 020     Ketone neg     Glucose neg         Health Management  Osteoporosis   * Dexa Scan Axial Skel (Hip, Pelvis, Spine); every 2 years; Last 49HYK1373; Next Due: 32KBC8876; Active  Other screening mammogram   Digital Bilateral Screening Mammogram With CAD; every 1 year; Next Due: 13SYW6546; Overdue  Screening for colon cancer   COLONOSCOPY; every 10 years; Last 65GFZ8208; Next Due: 76WIM5116;  Active    Future Appointments    Date/Time Provider Specialty Site   13/16/3919 46:73 AM Cyril Valenzuela, [address] [place] [address]   58/06/5888 47:75 PM [facility]     Signatures   Electronically signed by : Eduar Escamilla DO; Jan 13 0596  4:21PM EST                       (Author)

## 2018-01-14 VITALS — BODY MASS INDEX: 26.7 KG/M2 | HEIGHT: 65 IN | WEIGHT: 160.25 LBS

## 2018-01-14 VITALS
WEIGHT: 147.13 LBS | DIASTOLIC BLOOD PRESSURE: 70 MMHG | HEIGHT: 65 IN | BODY MASS INDEX: 24.51 KG/M2 | HEART RATE: 78 BPM | OXYGEN SATURATION: 92 % | SYSTOLIC BLOOD PRESSURE: 102 MMHG

## 2018-01-14 VITALS
DIASTOLIC BLOOD PRESSURE: 72 MMHG | BODY MASS INDEX: 25.05 KG/M2 | HEIGHT: 65 IN | SYSTOLIC BLOOD PRESSURE: 100 MMHG | WEIGHT: 150.38 LBS

## 2018-01-14 VITALS
HEIGHT: 65 IN | SYSTOLIC BLOOD PRESSURE: 122 MMHG | BODY MASS INDEX: 27.01 KG/M2 | WEIGHT: 162.13 LBS | DIASTOLIC BLOOD PRESSURE: 62 MMHG

## 2018-01-14 VITALS
DIASTOLIC BLOOD PRESSURE: 60 MMHG | WEIGHT: 159.13 LBS | HEIGHT: 65 IN | BODY MASS INDEX: 26.51 KG/M2 | SYSTOLIC BLOOD PRESSURE: 120 MMHG

## 2018-01-14 NOTE — RESULT NOTES
Message   Recorded as Task   Date: 02/26/2016 02:07 PM, Created By: System   Task Name: Financial Auth   Assigned To: Irma Roumelody   Regarding Patient: Diana March, Status: Active   Comment:    System - 26 Feb 2016 2:07 PM     MRI THORACIC SPINE WO CONTRAST requires Financial Judie Mazariegos - 02 Mar 2016 4:43 PM     TASK REASSIGNED: Previously Assigned To Walter Hernandezget - 08 Mar 2016 4:25 PM     TASK EDITED  Called [facility] to obtain auth for MRI, pending auth # [id number]   Carl Ghosh - 07 Mar 2016 11:38 AM     TASK EDITED  Case # [id number] approved Qiana Mcgrath # [id number] valid until 04 02 2016  Pt aware          Signatures   Electronically signed by : Janae Bailey, ; Mar  7 2016 11:39AM EST                       (Author)

## 2018-01-15 NOTE — MISCELLANEOUS
Signatures   Electronically signed by : Cata Durham, Linda Lennon; Apr 20 2016  9:48AM EST                       (Author)    Electronically signed by : David Smyth DO;  Apr 20 2016 11:13AM EST                       (Author)

## 2018-01-17 NOTE — MISCELLANEOUS
Message  S/w pt on telephone POD 1  Patient reports that she is doing really well  She is experiencing some pain his her back, which is a little more than she had expected, but she is doing okay with relaxation and taking her percocet as prescribed  Patient currently denies any fevers/chills/N/V/S&S infection  Answered any questions she may have had at this time and verified date/time/location of her POVs  Patient verbalized understanding        Signatures   Electronically signed by : Rocco Koch RN; Aug  4 2016  2:01PM EST                       (Author)

## 2018-01-17 NOTE — MISCELLANEOUS
Message   Recorded as Task   Date: 02/26/2016 03:49 PM, Created By: Kole Street   Task Name: Follow Up   Assigned To: SPA stim,Team   Regarding Patient: Diana March, Status: Active   Comment:    Kole Street - 26 Feb 2016 3:49 PM     TASK CREATED  Pt beginning process of STIM trial  Pt given order to obtain psych eval, as well as list of psych physicians for eval  [facility] 'patient questionaire and release for the benefit of [facility] signed by pt  Check list for SCS trial started  Please contact pt to continue process of SCS trial  Pt scheduled for STIm education class on 3/11/16 at [facility] rep, Kaleigh QUINN , made aware  Lily Tsai - 29 Feb 2016 7:20 AM     TASK REASSIGNED: Previously Assigned To Lily Tsai        Active Problems    1  Abdominal bloating (787 3) (R14 0)   2  Abdominal mass, RUQ (right upper quadrant) (789 31) (R19 01)   3  Abdominal pain, RUQ (789 01) (R10 11)   4  Abnormal liver function test (790 6) (R94 5)   5  Atypical chest pain (786 59) (R07 89)   6  Benign essential HTN (401 1) (I10)   7  Bilious vomiting with nausea (787 04) (R11 14)   8  Bowel incontinence (787 60) (R15 9)   9  Celiac disease (579 0) (K90 0)   10  Cervicalgia (723 1) (M54 2)   11  Change in stool habits (787 99) (R19 4)   12  Chest pain (786 50) (R07 9)   13  Chronic pain syndrome (338 4) (G89 4)   14  Cigarette smoker (305 1) (F17 210)   15  Common bile duct dilation (576 8) (K83 8)   16  Compression fracture of L1 lumbar vertebra (805 4) (S32 010A)   17  Dental disorder (525 9) (K08 9)   18  Depression (311) (F32 9)   19  Disc degeneration, 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)   20  Disorder of salivary gland (527 9) (K11 9)   21  Esophageal reflux (530 81) (K21 9)   22  Fibromyalgia (729 1) (M79 7)   23  Gastric ulcer (531 90) (K25 9)   24  Gastroparesis (536 3) (K31 84)   25  Generalized osteoarthritis (715 00) (M15 9)   26   GERD (gastroesophageal reflux disease) (530 81) (K21 9) 27  Hand injury (959 4) (S69 90XA)   28  History of gastric bypass (V45 86) (Z98 89)   29  History of incisional hernia repair (V45 89) (Z98 89)   30  Lumbar stenosis without neurogenic claudication (724 02) (M48 06)   31  Mouth swelling (784 2) (R22 0)   32  Neoplasm of uncertain behavior of skin (238 2) (D48 5)   33  Omphalitis (686 9)   34  Osteoarthrosis of knee (715 36) (M17 9)   35  Osteoporosis (733 00) (M81 0)   36  Other chronic pain (338 29) (G89 29)   37  Other screening mammogram (V76 12) (Z12 31)   38  Peripheral neuropathy (356 9) (G62 9)   39  Postlaminectomy syndrome of lumbar region (722 83) (M96 1)   40  Restless legs syndrome (333 94) (G25 81)   41  Screening for colon cancer (V76 51) (Z12 11)   42  Thyroid disorder (246 9) (E07 9)   43  Tremor (781 0) (R25 1)   44  Urinary incontinence (788 30) (R32)   45  Wrist pain (719 43) (M25 539)    Current Meds   1  Amitriptyline HCl - 25 MG Oral Tablet; Take 1 tablet by mouth at bedtime; Therapy: 37XYW7776 to (Evaluate:20Mar2016)  Requested for: 98DAK7381; Last   Rx:33Tye4901 Ordered   2  BuPROPion HCl ER (SR) 150 MG Oral Tablet Extended Release 12 Hour; take 1 tablet   by mouth daily; Therapy: 64ZLI3485 to (Arron Samuels)  Requested for: 80TRY4059; Last   Rx:09Oct2015 Ordered   3  Diazepam 5 MG Oral Tablet; TAKE 1 TABLET BY MOUTH TWICE DAILY AS NEEDED; Therapy: 58QBM5329 to ([phone])  Requested for: 21XRD8521; Last   Rx:57Lam0674 Ordered   4  Dicyclomine HCl - 20 MG Oral Tablet; TAKE 1 TABLET EVERY 6 HOURS AS NEEDED; Therapy: 68SVT4532 to (Evaluate:12Mar2016)  Requested for: 84Ipz6141; Last   Rx:01Wey8316 Ordered   5  FLUoxetine HCl - 10 MG Oral Tablet; TAKE 1 TABLET DAILY; Therapy: 68NTR0231 to (Arron Samuels)  Requested for: 97LPV4563; Last   Rx:04Jan2016 Ordered   6  Gabapentin 600 MG Oral Tablet; TAKE 1 TABLET BY MOUTH 4 TIMES DAILY;    Therapy: 66THN7593 to (Evaluate:38Wqp2334)  Requested for: 38RWA7419; Last Rx:01Mar2016 Ordered   7  Lisinopril 10 MG Oral Tablet; take 1 tablet by mouth daily; Therapy: 71Jpm4452 to (Evaluate:12Apr2016)  Requested for: 66OEE1378; Last   Rx:13Jan2016 Ordered   8  Metoprolol Succinate ER 25 MG Oral Tablet Extended Release 24 Hour; take 1 tablet by   mouth daily; Therapy: 93EJJ9423 to (Evaluate:28Idi5951)  Requested for: 43XMZ3462; Last   Rx:09Nov2015 Ordered   9  Morphine Sulfate ER 60 MG Oral Capsule Extended Release 24 Hour; Take 1 capsule   twice daily; Therapy: 68TXA9479 to (Evaluate:01Apr2016); Last Rx:02Mar2016; Status: ACTIVE -   Retrospective By Protocol Authorization Ordered   10  Oxybutynin Chloride 5 MG Oral Tablet; TAKE 1 TABLET BY MOUTH THREE TIMES    DAILY; Therapy: 80JMP7714 to (Belton Belding)  Requested for: 22UUV2041; Last    Rx:78Qdx6953 Ordered   11  Oxycodone-Acetaminophen 5-325 MG Oral Tablet; TAKE ONE TABLET EVERY SIX    HOURS AS NEEDED FOR BREAKTHROUGH PAIN;    Therapy: 34VNJ4192 to (Last Rx:76Hxz8810) Ordered   12  OxyCONTIN 40 MG Oral Tablet ER 12 Hour Abuse-Deterrent; TAKE 1 TABLET EVERY    12 HOURS DAILY PRN; Therapy: 78UQM9214 to (Evaluate:31Mar2016); Last Rx:01Mar2016 Ordered   13  Pantoprazole Sodium 40 MG Oral Tablet Delayed Release; take 2 tablets by mouth    daily; Therapy: 93WDF7228 to (Windy Pollen)  Requested for: 28HRY7712; Last    Rx:88Dfk3789 Ordered   14  Probiotic Oral Capsule; USE AS DIRECTED; Therapy: (Recorded:20Oet9529) to Recorded   15  Simethicone 125 MG Oral Tablet Chewable; CHEW AND SWALLOW 1 TABLET 4 TIMES    DAILY, AFTER MEALS AND AT BEDTIME; Therapy: 40TCT2876 to (Evaluate:08Nov2015); Last Rx:01Jnd0623 Ordered   16  Super B Complex TABS; TAKE 1 TABLET DAILY AS DIRECTED; Therapy: (Recorded:14Lxf7058) to Recorded   17  Vitamin D 2000 UNIT Oral Capsule; Therapy: (Recorded:63Sji5379) to Recorded    Allergies    1  Penicillins   2   Cortisone Acetate TABS    Signatures   Electronically signed by : Sabrina Hanson Matthew Asher, ; Mar  2 2016  4:13PM EST                       (Author)

## 2018-01-22 VITALS
DIASTOLIC BLOOD PRESSURE: 84 MMHG | SYSTOLIC BLOOD PRESSURE: 148 MMHG | RESPIRATION RATE: 16 BRPM | TEMPERATURE: 97.7 F | WEIGHT: 160 LBS | HEIGHT: 65 IN | BODY MASS INDEX: 26.66 KG/M2 | HEART RATE: 64 BPM

## 2018-01-22 VITALS
WEIGHT: 152.13 LBS | SYSTOLIC BLOOD PRESSURE: 128 MMHG | BODY MASS INDEX: 25.34 KG/M2 | DIASTOLIC BLOOD PRESSURE: 76 MMHG | HEIGHT: 65 IN

## 2018-01-22 VITALS
HEIGHT: 65 IN | RESPIRATION RATE: 16 BRPM | HEART RATE: 59 BPM | WEIGHT: 162 LBS | BODY MASS INDEX: 26.99 KG/M2 | SYSTOLIC BLOOD PRESSURE: 137 MMHG | DIASTOLIC BLOOD PRESSURE: 69 MMHG | TEMPERATURE: 97.9 F

## 2018-01-23 VITALS
DIASTOLIC BLOOD PRESSURE: 72 MMHG | BODY MASS INDEX: 25.66 KG/M2 | WEIGHT: 154 LBS | SYSTOLIC BLOOD PRESSURE: 142 MMHG | HEIGHT: 65 IN

## 2018-01-23 NOTE — PROGRESS NOTES
Assessment   1  Chronic pain syndrome (338 4) (G89 4)  2  Postlaminectomy syndrome of lumbar region (722 83) (M96 1)    Plan    · Schedule Surgery Treatment  Procedure  Status: Hold For - Scheduling  Requested for:  40XET2172  Ordered; For: Postlaminectomy syndrome of lumbar region;  Ordered By: Patsy Garcia  Performed:   Due: 76MYG8678    Discussion/Summary    This is a 63 yo female with LBP, left hip pain, and left greater than right leg pain  History of previous lumbar fusion  Underwent successful SCS trial with Dr Mary Kay Driver  She is 10 months from SCS placement  Reports she lost efficacy despite programing  She presents requesting removal and has not used the device in several months  She also reports pain at the IPG site  She had an ERCP 4 months ago and subsequent infection with sepsis  She is also having dental work this week secondary to infection  Discussion held for greater than 45 minutes in reference to options of removal of full system, removal of IPG alone, leaving system in and OFF, further programming, and another companies technology via an adapter  She wishes to proceed with IPG removal alone  This is a better option than full system removal given her history of osteoporosis, compression fractures, and infection/sepsis  She understands that she can no longer obtain MR imaging with an open electrode  We will wait a minimum of 6-8 weeks given she is undergoing dental work  I cautioned the patient about using pain medications such as the Fentanyl patch and that her SCS was working well for her previously  Dr Mary Kay Driver of pain Management had also advised her against narcotics and felt she was not using the SCS  I did discuss with the patient that she is not using the SCS effectively, but she was not interested in this route  1      The risks and benefits of surgery were reviewed with the patient and family and they wish to proceed   These risks include, but are not limited to bleeding, infection, device malfunction, and malpositioning  All questions were answered and appropriate contact information was given in case questions arise in the future  They will undergo preoperative clearance and be scheduled for surgery in the near future  1 Amended By: Reed Primrose; May 15 2017 9:51 AM EST    Chief Complaint  Patient presents to office for 6 week post-op  History of Present Illness  She is now 10 months from SCS placement  She reports it was initially working well for her, but she feels she lost efficacy about 7 months ago  She presents requesting removal and has not used the device in several months  She also reports pain at the IPG site  She had an ERCP 4 months ago and subsequent infection with sepsis  She is also having dental work this week secondary to infection  She has been placed on a Fentanyl patch by her PCP  She reports that Dr Linn Otero of pain management did not think it was appropriate to start further pain medications given she was doing well with her SCS, but abruptly was not  1    She presented 6 weeks from SCS placement  Doing well  Mild incisional pain at the IPG site which has improved  Denies fevers or chills  Reporting great pain relief and paraesthesia coverage  She susan like the stimulation strnger in her left hip  In Review, this is a very pleasant 65 yo female with chronic pain involving her lower back, left hip, and bilateral LE's  She describes sharp lower back pain that radiates into her left hip  She also has left greater than right posterior leg pain to her foot  History of prior lumbar fusion in 2014 with Dr Daily Ayala  PResents after undergoing a successful SCS trial with Dr Linn Otero obtaining 80% pain relief and wishes to proceed with permanent implant  Has a history of osteoporosis with chronic compression fractures  Was previously treated with injections  Baseline use of a cane or several years         1 Amended By: Reed Primrose; May 15 2017 9:51 AM EST    Review of Systems    Constitutional: feeling poorly, recent 10lbs lb weight gain and feeling tired, but no fever, no chills and no recent weight loss  The patient presents with complaints of eyesight problems, described as blurry vision  ENT: trouble swallowing  Cardiovascular: palpitations and irregular heartbeat, but the heart rate was not slow, no chest pain, no intermittent leg claudication, the heart rate was not fast and no lower extremity edema  Respiratory: No complaints of shortness of breath, no wheezing, no cough, no SOB on exertion, no orthopnea, no PND  Gastrointestinal: abdominal pain, nausea, vomiting and GI issues, but no constipation, no diarrhea and no blood in stools  Genitourinary: incontinence and urinary and bowel incontenince, but no dysuria, no pelvic pain, no vaginal discharge, no dysmenorrhea and no unexplained vaginal bleeding  Musculoskeletal: joint swelling, limb pain, joint stiffness and bilateral leg pain worse on left  Nueropathy, but no arthralgias and no myalgias  Integumentary: No complaints of skin rash or lesions, no itching, no skin wounds, no breast pain or lump  Neurological: bilateral leg weakness, N&T from upper thigh down leg, but no headache, no numbness, no tingling, no confusion, no limb weakness, no convulsions, no fainting and no difficulty walking    The patient presents with complaints of dizziness, described as loss of balance  Psychiatric: anxiety, sleep disturbances and depression, but not suicidal, no personality change and no emotional problems  Endocrine: muscle weakness  Hematologic/Lymphatic: a tendency for easy bleeding, a tendency for easy bruising and enzyme deff  ROS reviewed  Active Problems   1  Abdominal bloating (787 3) (R14 0)  2  Abdominal mass, RUQ (right upper quadrant) (789 31) (R19 01)  3  Abdominal pain, RUQ (789 01) (R10 11)  4  Abnormal liver function test (790 6) (R79 89)  5   Aftercare following surgery (V58 89) (Z48 89)  6  Atypical chest pain (786 59) (R07 89)  7  Benign essential HTN (401 1) (I10)  8  Bilious vomiting with nausea (787 04) (R11 14)  9  Celiac disease (579 0) (K90 0)  10  Cervicalgia (723 1) (M54 2)  11  Change in stool habits (787 99) (R19 4)  12  Chest pain (786 50) (R07 9)  13  Chronic pain syndrome (338 4) (G89 4)  14  Cigarette smoker (305 1) (F17 210)  15  Common bile duct dilation (576 8) (K83 8)  16  Compression fracture of L1 lumbar vertebra (805 4) (S32 010A)  17  Dental disorder (525 9) (K08 9)  18  Depression (311) (F32 9)  19  Disc degeneration, 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)  20  Disorder of salivary gland (527 9) (K11 9)  21  Elevated liver enzymes (790 5) (R74 8)  22  Encounter for removal of staples (V58 32) (Z48 02)  23  Encounter for screening for malignant neoplasm of colon (V76 51) (Z12 11)  24  Encounter for screening mammogram for malignant neoplasm of breast (V76 12)    (Z12 31)  25  Esophageal reflux (530 81) (K21 9)  26  Fibromyalgia (729 1) (M79 7)  27  Gastric ulcer (531 90) (K25 9)  28  Gastroparesis (536 3) (K31 84)  29  Generalized osteoarthritis (715 00) (M15 9)  30  GERD (gastroesophageal reflux disease) (530 81) (K21 9)  31  Hand injury (959 4) (S69 90XA)  32  History of gastric bypass (V45 86) (Z98 890)  33  History of incisional hernia repair (V45 89) (Z98 890,Z87 19)  34  Hospital-acquired pneumonia (486) (J18 9)  35  Lumbar stenosis without neurogenic claudication (724 02) (M48 06)  36  Mouth swelling (784 2) (R22 0)  37  Neoplasm of uncertain behavior of skin (238 2) (D48 5)  38  Omphalitis (686 9)  39  Oral thrush (112 0) (B37 0)  40  Osteoarthrosis of knee (715 36) (M17 10)  41  Osteoporosis (733 00) (M81 0)  42  Other chronic pain (338 29) (G89 29)  43  Other screening mammogram (V76 12) (Z12 31)  44  Peripheral neuropathy (356 9) (G62 9)  45  Postlaminectomy syndrome of lumbar region (722 83) (M96 1)  46   Postprocedural state (V45 89) (Z98 890)  47  Pre-op evaluation (V72 84) (Z01 818)  48  Pre-procedural laboratory examination (V72 63) (Z01 812)  49  Restless legs syndrome (333 94) (G25 81)  50  Screening for colon cancer (V76 51) (Z12 11)  51  Sepsis, due to unspecified organism (038 9,995 91) (A41 9)  52  Skin ulcer of hand, with unspecified severity (707 8) (L98 499)  53  Status post surgery (V45 89) (Z98 890)  54  Thyroid disorder (246 9) (E07 9)  55  Transient atrial fibrillation (427 31) (I48 91)  56  Tremor (781 0) (R25 1)  57  Urinary incontinence (788 30) (R32)  58  UTI symptoms (788 99) (R39 9)  59  Visit for pre-operative examination (V72 84) (Z01 818)  60  Wrist pain (719 43) (M25 539)    Past Medical History   1  History of Abdominal pain, epigastric (789 06) (R10 13)  2  History of Abdominal pain, RUQ (789 01) (R10 11)  3  History of Anemia (285 9) (D64 9)  4  History of Asthma (493 90) (J45 909)  5  History of Asymptomatic menopausal state (V49 81) (Z78 0)  6  History of Backache (724 5) (M54 9)  7  History of Bowel incontinence (787 60) (R15 9)  8  History of Cigarette smoker (305 1) (F17 210)  9  History of Cough (786 2) (R05)  10  History of Galactorrhea With Normal Prolactins (611 6)  11  History of Gastritis (535 50) (K29 70)  12  History of abdominal pain (V13 89) (Z87 898)  13  History of diabetes mellitus (V12 29) (Z86 39)  14  History of diarrhea (V12 79) (Z87 898)  15  History of malignant neoplasm (V10 90) (Z85 9)  16  History of osteopenia (V13 59) (Z87 39)  17  History of thyroid disease (V12 29) (Z86 39)  18  History of upper respiratory infection (V12 09) (Z87 09)  19  History of Hyperlipidemia (272 4) (E78 5)  20  History of Hypoglycemia (251 2) (E16 2)  21  History of Incisional hernia (553 21) (K43 2)  22  History of Lymphadenopathy (785 6) (R59 1)  23  History of Malabsorption syndrome (579 9) (K90 9)  24  History of Need for influenza vaccination (V04 81) (Z23)  25  History of Obesity (278 00) (E66 9)  26  History of Pre-procedural examination (V7 84) (Z01 818)  27  History of Upper respiratory infection (465 9) (J06 9)    The active problems and past medical history were reviewed and updated today  Surgical History   1  History of Appendectomy  2  History of Back Surgery  3  History of  Section  4  History of Gastric Surgery For Morbid Obesity Gastric Bypass  5  History of Hernia Repair  6  History of Knee Surgery  7  History of Tubal Ligation  8  History of Wrist Surgery    The surgical history was reviewed and updated today  Family History  Mother   1  Family history of diabetes mellitus (V18 0) (Z83 3)  2  Family history of hypertension (V17 49) (Z82 49)  3  Family history of Retinal disease  Father   4  Family history of lung cancer (V16 1) (Z80 1)  5  Family history of malignant neoplasm of brain (V16 8) (Z80 8)  6  Family history of renal failure (V18 69) (Z84 1)  Family History   7  Family history of cardiac disorder (V17 49) (Z82 49)  8  Family history of diabetes mellitus (V18 0) (Z83 3)  9  Family history of hypertension (V17 49) (Z82 49)  10  Family history of myocardial infarction (V17 3) (Z82 49)  11  Family history of High cholesterol    The family history was reviewed and updated today  Social History    · Denied: History of Alcohol use   · Current Every Day Smoker (305 1)   · Denied: History of Drug use   · Social alcohol use (Z78 9)  The social history was reviewed and updated today  Current Meds  1  AmLODIPine Besylate 2 5 MG Oral Tablet; take 1 tablet by mouth daily; Therapy: 87FAK6714 to (Evaluate:2017)  Requested for: 85JNZ6586; Last   Rx:51Ipf4683; Status: ACTIVE - Retrospective By Protocol Authorization Ordered  2  BuPROPion HCl ER (SR) 150 MG Oral Tablet Extended Release 12 Hour; take 1 tablet by   mouth daily;    Therapy: 15MMU1931 to (Evaluate:31Ayc5504)  Requested for: 2017; Last   Rx:2017; Status: ACTIVE - Retrospective By Protocol Authorization Ordered  3  Calcium CAPS; Therapy: (Recorded:05May2017) to Recorded  4  DiazePAM 5 MG Oral Tablet; TAKE 1 TABLET BY MOUTH TWICE DAILY AS NEEDED; Therapy: 73YBK7915 to (Evaluate:56Nxj3935)  Requested for: 28Otl3109; Last   Rx:55Qga4814; Status: ACTIVE - Retrospective By Protocol Authorization Ordered  5  Dicyclomine HCl - 20 MG Oral Tablet; TAKE 1 TABLET BY MOUTH EVERY 6 HOURS AS   NEEDED; Therapy: 59ZKO3466 to (Evaluate:96Sbj8007)  Requested for: 84Gat8990; Last   Rx:20Rwb5406 Ordered  6  FentaNYL 25 MCG/HR Transdermal Patch 72 Hour; apply 1 patch every 72 hours; Therapy: 63WUW5913 to (Evaluate:29Kem4663); Last GT:17FRV2622; Status: ACTIVE -   Retrospective By Protocol Authorization Ordered  7  FLUoxetine HCl - 10 MG Oral Tablet; take 1 tablet by mouth daily; Therapy: 56JCR3538 to (Evaluate:98Sks5883)  Requested for: 16Zis5359; Last   Rx:20Mar2017 Ordered  8  Gabapentin 600 MG Oral Tablet; TAKE 1 TABLET BY MOUTH FOUR TIMES DAILY; Therapy: 86Vqp1588 to (Evaluate:30Fqx3824)  Requested for: 29KWR9160; Last   TP:16HVW6575; Status: ACTIVE - Retrospective By Protocol Authorization Ordered  9  Hair/Skin/Nails TABS; Therapy: (Recorded:05May2017) to Recorded  10  Levothyroxine Sodium 25 MCG Oral Tablet; take 1 tablet by mouth daily; Therapy: 28QVO8400 to (Linda Motta)  Requested for: 08Hyx3713; Last    Rx:26Mim4610 Ordered  11  Lisinopril 10 MG Oral Tablet; take 1 tablet by mouth daily; Therapy: 67Fna3554 to (Evaluate:98Rdj7251)  Requested for: 73XYB2100; Last    Rx:61Wnn6702 Ordered  12  Metoprolol Succinate ER 25 MG Oral Tablet Extended Release 24 Hour; take 1 tablet by    mouth every day; Therapy: 34TYF1795 to [phone])  Requested for: 80Tgk3942; Last    Rx:12Ctu2099; Status: ACTIVE - Retrospective By Protocol Authorization Ordered  13  Mupirocin 2 % External Ointment; APPLY SPARINGLY EXTERNALLY TO THE AFFECTED    AREA 2 TO 3 TIMES DAILY;     Therapy: 99FJC3321 to (Ana Paula Sham)  Requested for: 14Apr2017; Last    Rx:26Mar2017 Ordered  14  Oxybutynin Chloride 5 MG Oral Tablet; TAKE 1 TABLET BY MOUTH THREE TIMES DAILY; Therapy: 15PMT6675 to (Evaluate:96Biv5854)  Requested for: 67CPU3383; Last    GB:79KZQ4619; Status: ACTIVE - Retrospective By Protocol Authorization Ordered  15  Oxycodone-Acetaminophen 5-325 MG Oral Tablet; TAKE ONE TABLET EVERY SIX    HOURS AS NEEDED FOR BREAKTHROUGH PAIN;    Therapy: 32Qvt1028 to (Last Rx:18Apr2017); Status: ACTIVE - Retrospective By Protocol    Authorization Ordered  16  Pantoprazole Sodium 40 MG Oral Tablet Delayed Release; take 2 tablets by mouth daily; Therapy: 22HGH5850 to (Evaluate:07Lnm1210)  Requested for: 17ZNO2551; Last    Rx:01Mar2017 Ordered  17  Simethicone 125 MG Oral Tablet Chewable; CHEW AND SWALLOW 1 TABLET 4 TIMES    DAILY, AFTER MEALS AND AT BEDTIME; Therapy: 95CFT4049 to (Evaluate:08Nov2015); Last Rx:09Oct2015 Ordered  18  Super B Complex TABS; TAKE 1 TABLET DAILY AS DIRECTED; Therapy: (Recorded:50Ejm4819) to Recorded  19  Vitamin D 2000 UNIT Oral Capsule; Therapy: (Recorded:94Baj0553) to Recorded    The medication list was reviewed and updated today  Allergies   1  Morphine Derivatives  2  Penicillins  3  Cortisone Acetate TABS  4  Morphine Sulfate TABS   5  Adhesive Tape    Vitals  Vital Signs    Recorded: 34CEW9468 08:34AM   Temperature 97 7 F, Tympanic   Heart Rate 64, L Brachial Artery   Pulse Quality Normal, L Brachial Artery   Respiration Quality Normal   Respiration 16   Systolic 634, LUE, Sitting   Diastolic 84, LUE, Sitting   Height 5 ft 5 in   Weight 160 lb    BMI Calculated 26 63   BSA Calculated 1 8     Physical Exam     Constitutional Patient appears healthy and well developed  Head and Face Normal on inspection  Eyes Fundoscopic exam is benign  Neck No JVD  Carotid pulses: 2+ and equal bilaterally, without bruits  Respiratory Auscultation of lungs: Clear to auscultation   No rales, rhonchi, wheezes appreciated over the lungs bilaterally  Cardiovascular Auscultation of heart: Rhythm is regular, no gallop or rubs  No heart murmur appreciated  Peripheral vascular exam: Pedal Pulses: 2+ and equal bilaterally  Radial pulses: 2+ and equal bilaterally  Extremities: Peripheral circulation is normal    Abdomen Soft, nontender, and nondistended without guarding, rigidity or rebound tenderness  Musculo: Spine Contour is normal  No tenderness of the spine billaterally  Skin warm and dry  Incisions well healed  Well placed IPG  No prominence  Neurologic - Mental Status: Alert and Oriented x3  Mood and affect: Affect is normal   Memory is grossly intact  Attention is [facility] Speech: Language is fluent  Cranial Nerve Exam:  2nd cranial nerve: Intact  3rd, 4th, and 6th cranial nerves: Intact  5th cranial nerve: Intact  7th cranial nerve: Intact  8th cranial nerve: Intact  9th and 10th cranial nerves: Intact  11th cranial nerve: Intact  12th cranial nerve: Intact  Motor System General Motor Strength: No pronator drift and no parietal drift  Motor System - Upper Extremities: Muscle strength: 5/5 bilaterally  Muscle tone: Normal bilaterally  Motor System - Lower Extremities: Muscle strength: 5/5 bilaterally  Muscle tone: Normal bilaterally  Reflexes: DTR's are brisk, symmetric and 2+ bilaterally  Babinski's reflex is down going bilaterally symmetrical bilaterally  Coordination: Normal    Sensory: Sensation grossly intact to light touch  Sensation grossly intact to pinprick  Gait and Station: Vance with a normal gait  Health Management  Osteoporosis   * Dexa Scan Axial Skel (Hip, Pelvis, Spine); every 2 years; Last 80QLR2697; Next Due: 06ZFG6323; Active  Other screening mammogram   Digital Bilateral Screening Mammogram With CAD; every 1 year; Next Due: 97LQR4078; Overdue  Screening for colon cancer   COLONOSCOPY; every 10 years; Last 90JGG9277;  Next Due: 88JHU8241; Active    Future Appointments    Date/Time Provider Specialty Site   62/79/9711 84:01 PM Tony Driver DO Family Medicine [facility]     Surgery Scheduling Form    Location:[address] [place] [address]    Confirmation Number:   Procedure Date: 07/14/171    Requested Time:   Surgeon: Nelda Christie  Co-Surgeon:   [facility] Required:   Bed:1  Out Patient - No Bed Required1   Anesthesia: IV Sedation w/Anesthesia  PROCEDURE DETAILS   Procedure: Removal of a Left Buttock SCS IPG  Laterality/Level: Anticipated frozen section:   CPT Code(s):1  205948    Pre-Op Diagnosis: Chronic pain, post laminect  Dx Code(s):1  G89 4, M96 11     Length of Procedure: 30 min  Equipment:   Equipment Needs: Lateral    Implants/Representative: St  edwina-Need them present at case   Is the patient able to walk up a flight of stairs, walk up a hill or do heavy housework WITHOUT having chest pain or shortness of breath? REGISTRATION & FINANCIAL CLEARANCE    FA Initials:   Insurance:1  [facility]    Policy Number:1  [id number]  Group Number:     PRE-ADMISSION TESTING/CLINICAL INFORMATION   PAT Location:1  Outpatient Testing Elsewhere1    PAT Comments:1  [facility]PT [facility] - 6/15[facility]171   Communication Barrier:   Primary Care Physician:1  [email or web address]       CONSULTS NEEDED:   Anesthesia Consult:   Medical Consult:   Cardiac Consult:     ALLERGIES AND ALERTS   Latex Allergy:1  NO1    Penicillin Allergy:1  YES1    Malignant Hyperthermia:1  NO1    Diabetic Patient:1  NO1    Height:1  5'5"1   Weight:1  72 58 KG1   COMMENTS   Scheduling Information Provided by:1  TIFFANYS1   IN OFFICE USE   Urgency:   Craniotomy Details:   Lab Studies Ordered: Full Labs Ordered, Medically Cleared   PRYBLICK - 7/1/136   Films   Bracing:   Bone Stimulator   Return to office 2 Weeks post op         1 Amended By: Leonardo Belle; Jun 01 2017 3:47 PM EST    Signatures   Electronically signed by : BARRY Anderson ; May 15 2017  9:16AM EST (Author)    Electronically signed by : BARRY Maldonado ; May 15 2017  9:53AM EST                       (Author)    Electronically signed by : BARRY Maldonado ; Jun 2 2017  4:35PM EST                       (Author)    Electronically signed by : BARRY Maldonado ; Jun 2 2017  4:35PM EST                       (Author)

## 2018-02-01 DIAGNOSIS — G89.4 CHRONIC PAIN DISORDER: ICD-10-CM

## 2018-02-01 DIAGNOSIS — F41.9 ANXIETY: Primary | ICD-10-CM

## 2018-02-01 DIAGNOSIS — I10 ESSENTIAL HYPERTENSION: Primary | ICD-10-CM

## 2018-02-01 PROBLEM — R32 URINARY INCONTINENCE: Status: ACTIVE | Noted: 2017-07-31

## 2018-02-01 RX ORDER — OXYCODONE HYDROCHLORIDE AND ACETAMINOPHEN 5; 325 MG/1; MG/1
1 TABLET ORAL EVERY 6 HOURS PRN
Qty: 120 TABLET | Refills: 0 | Status: SHIPPED | OUTPATIENT
Start: 2018-02-01 | End: 2018-03-05 | Stop reason: SDUPTHER

## 2018-02-01 RX ORDER — AMLODIPINE BESYLATE 2.5 MG/1
TABLET ORAL
Qty: 30 TABLET | Refills: 0 | Status: SHIPPED | OUTPATIENT
Start: 2018-02-01 | End: 2018-04-30 | Stop reason: SDUPTHER

## 2018-02-01 RX ORDER — DIAZEPAM 5 MG/1
5 TABLET ORAL 2 TIMES DAILY
Qty: 60 TABLET | Refills: 0 | OUTPATIENT
Start: 2018-02-01 | End: 2018-03-05 | Stop reason: SDUPTHER

## 2018-02-02 ENCOUNTER — TELEPHONE (OUTPATIENT)
Dept: FAMILY MEDICINE CLINIC | Facility: CLINIC | Age: 63
End: 2018-02-02

## 2018-02-02 NOTE — TELEPHONE ENCOUNTER
Ultrasound is unremarkable with regards to her common bile duct  It is the same size as last examined  All other organs are unremarkable  She has a tiny cyst on the right kidney which is of no clinical concern

## 2018-02-09 DIAGNOSIS — G89.4 CHRONIC PAIN SYNDROME: Primary | ICD-10-CM

## 2018-02-09 RX ORDER — METHOCARBAMOL 500 MG/1
TABLET, FILM COATED ORAL
Qty: 90 TABLET | Refills: 0 | Status: SHIPPED | OUTPATIENT
Start: 2018-02-09 | End: 2018-03-10 | Stop reason: SDUPTHER

## 2018-02-13 DIAGNOSIS — I10 ESSENTIAL HYPERTENSION: Primary | ICD-10-CM

## 2018-02-13 RX ORDER — METOPROLOL SUCCINATE 25 MG/1
TABLET, EXTENDED RELEASE ORAL
Qty: 90 TABLET | Refills: 0 | Status: SHIPPED | OUTPATIENT
Start: 2018-02-13 | End: 2018-05-15 | Stop reason: SDUPTHER

## 2018-02-14 DIAGNOSIS — G89.29 OTHER CHRONIC PAIN: Primary | ICD-10-CM

## 2018-02-14 RX ORDER — FENTANYL 50 UG/H
1 PATCH TRANSDERMAL
Qty: 10 PATCH | Refills: 0 | Status: SHIPPED | OUTPATIENT
Start: 2018-02-14 | End: 2018-03-13 | Stop reason: SDUPTHER

## 2018-02-18 DIAGNOSIS — K21.9 GASTROESOPHAGEAL REFLUX DISEASE, ESOPHAGITIS PRESENCE NOT SPECIFIED: Primary | ICD-10-CM

## 2018-02-18 RX ORDER — PANTOPRAZOLE SODIUM 40 MG/1
TABLET, DELAYED RELEASE ORAL
Qty: 180 TABLET | Refills: 0 | Status: SHIPPED | OUTPATIENT
Start: 2018-02-18 | End: 2018-05-21 | Stop reason: SDUPTHER

## 2018-02-18 RX ORDER — PANTOPRAZOLE SODIUM 40 MG/1
TABLET, DELAYED RELEASE ORAL
Qty: 180 TABLET | Refills: 0 | OUTPATIENT
Start: 2018-02-18

## 2018-02-19 ENCOUNTER — TELEPHONE (OUTPATIENT)
Dept: FAMILY MEDICINE CLINIC | Facility: CLINIC | Age: 63
End: 2018-02-19

## 2018-02-22 DIAGNOSIS — I10 BENIGN ESSENTIAL HTN: Primary | ICD-10-CM

## 2018-02-22 RX ORDER — METOPROLOL SUCCINATE 50 MG/1
TABLET, EXTENDED RELEASE ORAL
Qty: 90 TABLET | Refills: 0 | Status: SHIPPED | OUTPATIENT
Start: 2018-02-22 | End: 2018-04-02

## 2018-03-05 DIAGNOSIS — G89.4 CHRONIC PAIN DISORDER: ICD-10-CM

## 2018-03-05 DIAGNOSIS — F41.9 ANXIETY: ICD-10-CM

## 2018-03-05 RX ORDER — DIAZEPAM 5 MG/1
5 TABLET ORAL 2 TIMES DAILY
Qty: 60 TABLET | Refills: 0 | OUTPATIENT
Start: 2018-03-05 | End: 2018-04-05 | Stop reason: SDUPTHER

## 2018-03-05 RX ORDER — OXYCODONE HYDROCHLORIDE AND ACETAMINOPHEN 5; 325 MG/1; MG/1
1 TABLET ORAL EVERY 6 HOURS PRN
Qty: 120 TABLET | Refills: 0 | Status: SHIPPED | OUTPATIENT
Start: 2018-03-05 | End: 2018-04-05 | Stop reason: SDUPTHER

## 2018-03-10 DIAGNOSIS — G89.4 CHRONIC PAIN SYNDROME: ICD-10-CM

## 2018-03-10 RX ORDER — METHOCARBAMOL 500 MG/1
TABLET, FILM COATED ORAL
Qty: 90 TABLET | Refills: 0 | Status: SHIPPED | OUTPATIENT
Start: 2018-03-10 | End: 2018-04-08 | Stop reason: SDUPTHER

## 2018-03-11 DIAGNOSIS — M79.7 FIBROMYALGIA: ICD-10-CM

## 2018-03-11 DIAGNOSIS — G25.81 RESTLESS LEGS SYNDROME: ICD-10-CM

## 2018-03-11 DIAGNOSIS — M15.9 GENERALIZED OSTEOARTHRITIS: Primary | ICD-10-CM

## 2018-03-12 RX ORDER — FENTANYL 50 UG/H
1 PATCH TRANSDERMAL
COMMUNITY
Start: 2017-05-01 | End: 2018-03-13

## 2018-03-12 RX ORDER — OXYBUTYNIN CHLORIDE 5 MG/1
1 TABLET ORAL 3 TIMES DAILY
COMMUNITY
Start: 2014-05-21 | End: 2018-04-02

## 2018-03-12 RX ORDER — AMLODIPINE BESYLATE 2.5 MG/1
1 TABLET ORAL DAILY
COMMUNITY
Start: 2016-11-22 | End: 2018-04-02

## 2018-03-12 RX ORDER — GABAPENTIN 600 MG/1
TABLET ORAL
COMMUNITY
Start: 2011-12-02 | End: 2018-04-02

## 2018-03-12 RX ORDER — OXYCODONE HYDROCHLORIDE AND ACETAMINOPHEN 5; 325 MG/1; MG/1
TABLET ORAL
COMMUNITY
Start: 2016-08-02 | End: 2018-04-02

## 2018-03-12 RX ORDER — SIMETHICONE 125 MG
1 TABLET,CHEWABLE ORAL
COMMUNITY
Start: 2015-10-09 | End: 2018-04-02

## 2018-03-12 RX ORDER — DIAZEPAM 5 MG/1
1 TABLET ORAL 2 TIMES DAILY PRN
COMMUNITY
Start: 2015-01-02 | End: 2018-04-02

## 2018-03-12 RX ORDER — DICYCLOMINE HCL 20 MG
1 TABLET ORAL EVERY 6 HOURS PRN
COMMUNITY
Start: 2015-03-19 | End: 2018-04-02

## 2018-03-12 RX ORDER — METOPROLOL SUCCINATE 50 MG/1
1 TABLET, EXTENDED RELEASE ORAL DAILY
COMMUNITY
Start: 2017-08-24 | End: 2018-04-02

## 2018-03-12 RX ORDER — CHOLECALCIFEROL (VITAMIN D3) 50 MCG
TABLET ORAL
COMMUNITY
End: 2018-04-02

## 2018-03-12 RX ORDER — FLUOXETINE 10 MG/1
TABLET, FILM COATED ORAL
COMMUNITY
Start: 2018-03-05 | End: 2018-04-02

## 2018-03-12 RX ORDER — PANTOPRAZOLE SODIUM 40 MG/1
2 TABLET, DELAYED RELEASE ORAL DAILY
COMMUNITY
Start: 2011-06-29 | End: 2018-04-02

## 2018-03-12 RX ORDER — METHOCARBAMOL 500 MG/1
TABLET, FILM COATED ORAL
COMMUNITY
Start: 2018-01-10 | End: 2018-04-02

## 2018-03-12 RX ORDER — LISINOPRIL 10 MG/1
1 TABLET ORAL DAILY
COMMUNITY
Start: 2015-04-10 | End: 2018-04-02

## 2018-03-12 RX ORDER — SUCRALFATE 1 G/1
TABLET ORAL
COMMUNITY
Start: 2018-01-24 | End: 2018-04-02

## 2018-03-12 RX ORDER — GREEN TEA/HOODIA GORDONII 315-12.5MG
CAPSULE ORAL
COMMUNITY
End: 2020-06-23

## 2018-03-12 RX ORDER — GABAPENTIN 600 MG/1
TABLET ORAL
Qty: 450 TABLET | Refills: 0 | Status: ON HOLD | OUTPATIENT
Start: 2018-03-12 | End: 2018-04-04

## 2018-03-12 RX ORDER — LEVOTHYROXINE SODIUM 0.03 MG/1
1 TABLET ORAL DAILY
COMMUNITY
Start: 2016-08-09 | End: 2018-04-02

## 2018-03-12 RX ORDER — BUPROPION HYDROCHLORIDE 150 MG/1
1 TABLET, EXTENDED RELEASE ORAL DAILY
COMMUNITY
Start: 2014-05-21 | End: 2018-04-21 | Stop reason: SDUPTHER

## 2018-03-13 DIAGNOSIS — G89.29 OTHER CHRONIC PAIN: ICD-10-CM

## 2018-03-13 RX ORDER — FENTANYL 50 UG/H
1 PATCH TRANSDERMAL
Qty: 10 PATCH | Refills: 0 | Status: SHIPPED | OUTPATIENT
Start: 2018-03-13 | End: 2018-04-10 | Stop reason: SDUPTHER

## 2018-04-02 ENCOUNTER — OFFICE VISIT (OUTPATIENT)
Dept: FAMILY MEDICINE CLINIC | Facility: CLINIC | Age: 63
End: 2018-04-02
Payer: COMMERCIAL

## 2018-04-02 ENCOUNTER — APPOINTMENT (INPATIENT)
Dept: CT IMAGING | Facility: HOSPITAL | Age: 63
DRG: 065 | End: 2018-04-02
Payer: COMMERCIAL

## 2018-04-02 ENCOUNTER — HOSPITAL ENCOUNTER (INPATIENT)
Facility: HOSPITAL | Age: 63
LOS: 2 days | Discharge: HOME HEALTH | DRG: 065 | End: 2018-04-04
Attending: EMERGENCY MEDICINE | Admitting: INTERNAL MEDICINE
Payer: COMMERCIAL

## 2018-04-02 ENCOUNTER — APPOINTMENT (EMERGENCY)
Dept: CT IMAGING | Facility: HOSPITAL | Age: 63
DRG: 065 | End: 2018-04-02
Payer: COMMERCIAL

## 2018-04-02 VITALS
WEIGHT: 156.6 LBS | DIASTOLIC BLOOD PRESSURE: 64 MMHG | HEIGHT: 65 IN | BODY MASS INDEX: 26.09 KG/M2 | SYSTOLIC BLOOD PRESSURE: 120 MMHG

## 2018-04-02 DIAGNOSIS — M15.9 GENERALIZED OSTEOARTHRITIS: ICD-10-CM

## 2018-04-02 DIAGNOSIS — I10 BENIGN ESSENTIAL HTN: Primary | ICD-10-CM

## 2018-04-02 DIAGNOSIS — M79.7 FIBROMYALGIA: ICD-10-CM

## 2018-04-02 DIAGNOSIS — I10 HYPERTENSION: ICD-10-CM

## 2018-04-02 DIAGNOSIS — G25.81 RESTLESS LEGS SYNDROME: ICD-10-CM

## 2018-04-02 DIAGNOSIS — I63.9 STROKE (CEREBRUM) (HCC): Primary | ICD-10-CM

## 2018-04-02 DIAGNOSIS — I63.9 CEREBROVASCULAR ACCIDENT (CVA), UNSPECIFIED MECHANISM (HCC): ICD-10-CM

## 2018-04-02 DIAGNOSIS — R53.1 RIGHT SIDED WEAKNESS: ICD-10-CM

## 2018-04-02 DIAGNOSIS — E07.9 DISEASE OF THYROID GLAND: ICD-10-CM

## 2018-04-02 DIAGNOSIS — R53.1 RIGHT SIDED WEAKNESS: Primary | ICD-10-CM

## 2018-04-02 PROBLEM — N32.81 OAB (OVERACTIVE BLADDER): Status: ACTIVE | Noted: 2017-08-09

## 2018-04-02 PROBLEM — F41.9 ANXIETY: Status: ACTIVE | Noted: 2018-04-02

## 2018-04-02 PROBLEM — M54.50 CHRONIC BILATERAL LOW BACK PAIN WITHOUT SCIATICA: Status: ACTIVE | Noted: 2017-08-09

## 2018-04-02 PROBLEM — G89.29 CHRONIC BILATERAL LOW BACK PAIN WITHOUT SCIATICA: Status: ACTIVE | Noted: 2017-08-09

## 2018-04-02 PROBLEM — Z72.0 TOBACCO USE: Chronic | Status: ACTIVE | Noted: 2018-04-02

## 2018-04-02 LAB
ANION GAP SERPL CALCULATED.3IONS-SCNC: 8 MMOL/L (ref 4–13)
APTT PPP: 35 SECONDS (ref 23–35)
ATRIAL RATE: 60 BPM
BASOPHILS # BLD AUTO: 0.04 THOUSANDS/ΜL (ref 0–0.1)
BASOPHILS NFR BLD AUTO: 1 % (ref 0–1)
BUN SERPL-MCNC: 8 MG/DL (ref 5–25)
CALCIUM SERPL-MCNC: 9.3 MG/DL (ref 8.3–10.1)
CHLORIDE SERPL-SCNC: 101 MMOL/L (ref 100–108)
CO2 SERPL-SCNC: 28 MMOL/L (ref 21–32)
CREAT SERPL-MCNC: 0.76 MG/DL (ref 0.6–1.3)
EOSINOPHIL # BLD AUTO: 0.06 THOUSAND/ΜL (ref 0–0.61)
EOSINOPHIL NFR BLD AUTO: 1 % (ref 0–6)
ERYTHROCYTE [DISTWIDTH] IN BLOOD BY AUTOMATED COUNT: 15.6 % (ref 11.6–15.1)
GFR SERPL CREATININE-BSD FRML MDRD: 84 ML/MIN/1.73SQ M
GLUCOSE SERPL-MCNC: 88 MG/DL (ref 65–140)
HCT VFR BLD AUTO: 36.8 % (ref 34.8–46.1)
HGB BLD-MCNC: 12.6 G/DL (ref 11.5–15.4)
INR PPP: 0.86 (ref 0.86–1.16)
LYMPHOCYTES # BLD AUTO: 2.68 THOUSANDS/ΜL (ref 0.6–4.47)
LYMPHOCYTES NFR BLD AUTO: 35 % (ref 14–44)
MCH RBC QN AUTO: 31.2 PG (ref 26.8–34.3)
MCHC RBC AUTO-ENTMCNC: 34.2 G/DL (ref 31.4–37.4)
MCV RBC AUTO: 91 FL (ref 82–98)
MONOCYTES # BLD AUTO: 0.69 THOUSAND/ΜL (ref 0.17–1.22)
MONOCYTES NFR BLD AUTO: 9 % (ref 4–12)
NEUTROPHILS # BLD AUTO: 4.09 THOUSANDS/ΜL (ref 1.85–7.62)
NEUTS SEG NFR BLD AUTO: 54 % (ref 43–75)
NRBC BLD AUTO-RTO: 0 /100 WBCS
P AXIS: 67 DEGREES
PLATELET # BLD AUTO: 257 THOUSANDS/UL (ref 149–390)
PLATELET # BLD AUTO: 270 THOUSANDS/UL (ref 149–390)
PMV BLD AUTO: 10.2 FL (ref 8.9–12.7)
PMV BLD AUTO: 10.7 FL (ref 8.9–12.7)
POTASSIUM SERPL-SCNC: 4.2 MMOL/L (ref 3.5–5.3)
PR INTERVAL: 174 MS
PROTHROMBIN TIME: 11.7 SECONDS (ref 12.1–14.4)
QRS AXIS: 48 DEGREES
QRSD INTERVAL: 86 MS
QT INTERVAL: 400 MS
QTC INTERVAL: 400 MS
RBC # BLD AUTO: 4.04 MILLION/UL (ref 3.81–5.12)
SODIUM SERPL-SCNC: 137 MMOL/L (ref 136–145)
T WAVE AXIS: 34 DEGREES
TROPONIN I SERPL-MCNC: <0.02 NG/ML
TROPONIN I SERPL-MCNC: <0.02 NG/ML
VENTRICULAR RATE: 60 BPM
WBC # BLD AUTO: 7.56 THOUSAND/UL (ref 4.31–10.16)

## 2018-04-02 PROCEDURE — 1111F DSCHRG MED/CURRENT MED MERGE: CPT | Performed by: NURSE PRACTITIONER

## 2018-04-02 PROCEDURE — 85730 THROMBOPLASTIN TIME PARTIAL: CPT | Performed by: EMERGENCY MEDICINE

## 2018-04-02 PROCEDURE — 99214 OFFICE O/P EST MOD 30 MIN: CPT | Performed by: NURSE PRACTITIONER

## 2018-04-02 PROCEDURE — 99285 EMERGENCY DEPT VISIT HI MDM: CPT

## 2018-04-02 PROCEDURE — 99222 1ST HOSP IP/OBS MODERATE 55: CPT | Performed by: INTERNAL MEDICINE

## 2018-04-02 PROCEDURE — 70450 CT HEAD/BRAIN W/O DYE: CPT

## 2018-04-02 PROCEDURE — 36415 COLL VENOUS BLD VENIPUNCTURE: CPT | Performed by: EMERGENCY MEDICINE

## 2018-04-02 PROCEDURE — 85049 AUTOMATED PLATELET COUNT: CPT | Performed by: INTERNAL MEDICINE

## 2018-04-02 PROCEDURE — 85610 PROTHROMBIN TIME: CPT | Performed by: EMERGENCY MEDICINE

## 2018-04-02 PROCEDURE — 93005 ELECTROCARDIOGRAM TRACING: CPT | Performed by: EMERGENCY MEDICINE

## 2018-04-02 PROCEDURE — 93010 ELECTROCARDIOGRAM REPORT: CPT | Performed by: INTERNAL MEDICINE

## 2018-04-02 PROCEDURE — 84484 ASSAY OF TROPONIN QUANT: CPT | Performed by: INTERNAL MEDICINE

## 2018-04-02 PROCEDURE — 85025 COMPLETE CBC W/AUTO DIFF WBC: CPT | Performed by: EMERGENCY MEDICINE

## 2018-04-02 PROCEDURE — 80048 BASIC METABOLIC PNL TOTAL CA: CPT | Performed by: EMERGENCY MEDICINE

## 2018-04-02 RX ORDER — ONDANSETRON 2 MG/ML
4 INJECTION INTRAMUSCULAR; INTRAVENOUS EVERY 6 HOURS PRN
Status: DISCONTINUED | OUTPATIENT
Start: 2018-04-02 | End: 2018-04-04 | Stop reason: HOSPADM

## 2018-04-02 RX ORDER — SUCRALFATE 1 G/1
1 TABLET ORAL 4 TIMES DAILY
COMMUNITY
End: 2018-04-21 | Stop reason: SDUPTHER

## 2018-04-02 RX ORDER — ATORVASTATIN CALCIUM 40 MG/1
40 TABLET, FILM COATED ORAL EVERY EVENING
Status: DISCONTINUED | OUTPATIENT
Start: 2018-04-02 | End: 2018-04-03

## 2018-04-02 RX ORDER — FENTANYL 50 UG/H
1 PATCH TRANSDERMAL
Status: DISCONTINUED | OUTPATIENT
Start: 2018-04-03 | End: 2018-04-04 | Stop reason: HOSPADM

## 2018-04-02 RX ORDER — LISINOPRIL 10 MG/1
TABLET ORAL
Qty: 90 TABLET | Refills: 3 | Status: SHIPPED | OUTPATIENT
Start: 2018-04-02 | End: 2020-05-21 | Stop reason: ALTCHOICE

## 2018-04-02 RX ORDER — NICOTINE 21 MG/24HR
1 PATCH, TRANSDERMAL 24 HOURS TRANSDERMAL DAILY
Status: DISCONTINUED | OUTPATIENT
Start: 2018-04-03 | End: 2018-04-04 | Stop reason: HOSPADM

## 2018-04-02 RX ORDER — DICYCLOMINE HCL 20 MG
20 TABLET ORAL 3 TIMES DAILY
Status: DISCONTINUED | OUTPATIENT
Start: 2018-04-02 | End: 2018-04-04 | Stop reason: HOSPADM

## 2018-04-02 RX ORDER — POLYETHYLENE GLYCOL 3350 17 G/17G
17 POWDER, FOR SOLUTION ORAL DAILY
Status: DISCONTINUED | OUTPATIENT
Start: 2018-04-03 | End: 2018-04-04 | Stop reason: HOSPADM

## 2018-04-02 RX ORDER — BUPROPION HYDROCHLORIDE 150 MG/1
150 TABLET, EXTENDED RELEASE ORAL DAILY
Status: DISCONTINUED | OUTPATIENT
Start: 2018-04-03 | End: 2018-04-04 | Stop reason: HOSPADM

## 2018-04-02 RX ORDER — PANTOPRAZOLE SODIUM 40 MG/1
40 TABLET, DELAYED RELEASE ORAL
Status: DISCONTINUED | OUTPATIENT
Start: 2018-04-03 | End: 2018-04-04 | Stop reason: HOSPADM

## 2018-04-02 RX ORDER — DIAZEPAM 5 MG/1
5 TABLET ORAL 2 TIMES DAILY
Status: DISCONTINUED | OUTPATIENT
Start: 2018-04-02 | End: 2018-04-04 | Stop reason: HOSPADM

## 2018-04-02 RX ORDER — LISINOPRIL 10 MG/1
10 TABLET ORAL DAILY
Status: DISCONTINUED | OUTPATIENT
Start: 2018-04-03 | End: 2018-04-04 | Stop reason: HOSPADM

## 2018-04-02 RX ORDER — LEVOTHYROXINE SODIUM 0.03 MG/1
TABLET ORAL
Qty: 90 TABLET | Refills: 3 | Status: SHIPPED | OUTPATIENT
Start: 2018-04-02 | End: 2018-04-02

## 2018-04-02 RX ORDER — AMLODIPINE BESYLATE 2.5 MG/1
2.5 TABLET ORAL DAILY
Status: DISCONTINUED | OUTPATIENT
Start: 2018-04-03 | End: 2018-04-04 | Stop reason: HOSPADM

## 2018-04-02 RX ORDER — ACETAMINOPHEN 325 MG/1
650 TABLET ORAL EVERY 8 HOURS SCHEDULED
Status: DISCONTINUED | OUTPATIENT
Start: 2018-04-02 | End: 2018-04-04 | Stop reason: HOSPADM

## 2018-04-02 RX ORDER — ASPIRIN 81 MG/1
81 TABLET, CHEWABLE ORAL DAILY
Status: DISCONTINUED | OUTPATIENT
Start: 2018-04-03 | End: 2018-04-04 | Stop reason: HOSPADM

## 2018-04-02 RX ORDER — GABAPENTIN 300 MG/1
600 CAPSULE ORAL 3 TIMES DAILY
Status: DISCONTINUED | OUTPATIENT
Start: 2018-04-02 | End: 2018-04-04 | Stop reason: HOSPADM

## 2018-04-02 RX ORDER — FAMOTIDINE 20 MG/1
20 TABLET, FILM COATED ORAL ONCE
Status: COMPLETED | OUTPATIENT
Start: 2018-04-02 | End: 2018-04-02

## 2018-04-02 RX ORDER — OXYCODONE HYDROCHLORIDE AND ACETAMINOPHEN 5; 325 MG/1; MG/1
1 TABLET ORAL EVERY 6 HOURS PRN
Status: DISCONTINUED | OUTPATIENT
Start: 2018-04-02 | End: 2018-04-04 | Stop reason: HOSPADM

## 2018-04-02 RX ORDER — OXYBUTYNIN CHLORIDE 5 MG/1
5 TABLET ORAL ONCE
Status: COMPLETED | OUTPATIENT
Start: 2018-04-02 | End: 2018-04-02

## 2018-04-02 RX ORDER — OXYBUTYNIN CHLORIDE 5 MG/1
5 TABLET ORAL DAILY
Status: DISCONTINUED | OUTPATIENT
Start: 2018-04-03 | End: 2018-04-04 | Stop reason: HOSPADM

## 2018-04-02 RX ORDER — OXYBUTYNIN CHLORIDE 5 MG/1
5 TABLET ORAL 3 TIMES DAILY
Status: DISCONTINUED | OUTPATIENT
Start: 2018-04-02 | End: 2018-04-02

## 2018-04-02 RX ORDER — DOCUSATE SODIUM 100 MG/1
100 CAPSULE, LIQUID FILLED ORAL 2 TIMES DAILY
Status: DISCONTINUED | OUTPATIENT
Start: 2018-04-02 | End: 2018-04-04 | Stop reason: HOSPADM

## 2018-04-02 RX ORDER — METOPROLOL SUCCINATE 25 MG/1
25 TABLET, EXTENDED RELEASE ORAL DAILY
Status: DISCONTINUED | OUTPATIENT
Start: 2018-04-03 | End: 2018-04-04 | Stop reason: HOSPADM

## 2018-04-02 RX ORDER — SODIUM CHLORIDE 9 MG/ML
75 INJECTION, SOLUTION INTRAVENOUS CONTINUOUS
Status: DISCONTINUED | OUTPATIENT
Start: 2018-04-02 | End: 2018-04-04 | Stop reason: HOSPADM

## 2018-04-02 RX ORDER — LEVOTHYROXINE SODIUM 0.03 MG/1
25 TABLET ORAL
Status: DISCONTINUED | OUTPATIENT
Start: 2018-04-03 | End: 2018-04-04 | Stop reason: HOSPADM

## 2018-04-02 RX ORDER — FLUOXETINE 10 MG/1
10 CAPSULE ORAL DAILY
Status: DISCONTINUED | OUTPATIENT
Start: 2018-04-03 | End: 2018-04-04 | Stop reason: HOSPADM

## 2018-04-02 RX ORDER — OXYBUTYNIN CHLORIDE 5 MG/1
10 TABLET ORAL
Status: DISCONTINUED | OUTPATIENT
Start: 2018-04-03 | End: 2018-04-04 | Stop reason: HOSPADM

## 2018-04-02 RX ORDER — ASPIRIN 81 MG/1
324 TABLET, CHEWABLE ORAL ONCE
Status: COMPLETED | OUTPATIENT
Start: 2018-04-02 | End: 2018-04-02

## 2018-04-02 RX ORDER — METHOCARBAMOL 500 MG/1
500 TABLET, FILM COATED ORAL EVERY 8 HOURS PRN
Status: DISCONTINUED | OUTPATIENT
Start: 2018-04-02 | End: 2018-04-04 | Stop reason: HOSPADM

## 2018-04-02 RX ORDER — SENNOSIDES 8.6 MG
1 TABLET ORAL DAILY
Status: DISCONTINUED | OUTPATIENT
Start: 2018-04-03 | End: 2018-04-04 | Stop reason: HOSPADM

## 2018-04-02 RX ADMIN — ASPIRIN 81 MG 324 MG: 81 TABLET ORAL at 16:19

## 2018-04-02 RX ADMIN — GABAPENTIN 600 MG: 300 CAPSULE ORAL at 21:23

## 2018-04-02 RX ADMIN — ACETAMINOPHEN 650 MG: 325 TABLET, FILM COATED ORAL at 21:23

## 2018-04-02 RX ADMIN — DICYCLOMINE HYDROCHLORIDE 20 MG: 20 TABLET ORAL at 21:23

## 2018-04-02 RX ADMIN — SODIUM CHLORIDE 75 ML/HR: 0.9 INJECTION, SOLUTION INTRAVENOUS at 18:28

## 2018-04-02 RX ADMIN — FAMOTIDINE 20 MG: 20 TABLET, FILM COATED ORAL at 16:19

## 2018-04-02 RX ADMIN — ATORVASTATIN CALCIUM 40 MG: 40 TABLET, FILM COATED ORAL at 19:28

## 2018-04-02 RX ADMIN — OXYBUTYNIN CHLORIDE 5 MG: 5 TABLET ORAL at 21:23

## 2018-04-02 RX ADMIN — DOCUSATE SODIUM 100 MG: 100 CAPSULE, LIQUID FILLED ORAL at 19:28

## 2018-04-02 RX ADMIN — OXYBUTYNIN CHLORIDE 5 MG: 5 TABLET ORAL at 22:09

## 2018-04-02 RX ADMIN — DIAZEPAM 5 MG: 5 TABLET ORAL at 19:28

## 2018-04-02 NOTE — H&P
History and Physical - [facility] Internal Medicine  NAME: Gianna Edge   AGE: 58 y o  SEX: female   MRN: [id number]  Unit/Bed#: ED 34 Encounter: [id number]  Admitting Physician: Majo Otero MD  PCP: Treva Craven DO  Date of Admission:  04/02/18    ASSESSMENT AND PLAN    1  Right-sided weakness:  Admit to telemetry for further workup  Unfortunately, due to presence of leads from neurostimulator placement, MRI brain cannot be performed  Repeat CT head in AM  Carotid  ultrasound, 2D echocardiogram, troponins q 3h x3, TSH, fasting lipid profile  Consult Neurology  ? Satruday night palsy --if CVA workup negative  PTOT eval   2   Chronic back pain/ lumbar stenosis /status post neurostimulator placement:  No active issues  Patient will continue on home pain medication regimen which she is very persistent on  Unfortunately patient had a new stimulator placement done, and MRI cannot be conducted  Patient does state that the neurostimulator was removed, but there is still presence of leads  3   Essential hypertension:  Blood pressure is reasonable  Continue with current antihypertensives  4   Generalized anxiety disorder/depression:  No active issues  Continue Valium, Wellbutrin, Prozac  5  Hypothyroidism:  Check TSH  Continue with levothyroxine  6   History of celiac disease:  No active issue  7   Tobacco use:  Patient continues to smoke cigarettes  Counseled smoking cessation  Nicotine patch does not work for her, makes her "sergio", hence  she refused  VTE PROPHYLAXIS: Enoxaparin (Lovenox)  / sequential compression device     CODE STATUS: FULL      Anticipated Length of Stay:  Patient will be admitted on an Inpatient basis with an anticipated length of stay of  more 2 midnights  Justification for Hospital Stay: CVA workup        CHIEF COMPLAINT:  Right-sided weakness since Friday        HISTORY OF PRESENT ILLNESS  Gianna Edge is a delightful 78-year-old lady with past medical history as below was sent in from PCPs office as she was found to have right-sided weakness  Patient states that she woke up on Friday with right hand numbness and weakness  She typically stated that she could not grasp, any objects with the right hand  Associated tingling numbness  She felt that this would go away hence did not seek any medical attention  It persisted all throughout the weekend -- patient went to PCP today  Patient was found to have right-sided weakness and to the Ed  Patient states that the weakness and numbness of the right hand has improved  Patient denies chest pain, PND, orthopnea, nausea, vomiting, diarrhea, constipation, blood in urine, fevers, chills, abdominal pain,diarrhea, constipation, dysuria, facial droop, slurred speech, seizure-like activity, recent travel, dizziness, syncope, fall, trauma to the head  She also denies having similar episodes in the recent past   Patient's  does mention that patient always sleeps in a recliner usually on her right-hand side      REVIEW OF SYSTEMS  Comprehensive 10 point review system conducted all negative except mentioned HPI     PMH/PSH    Past Medical History:   Diagnosis Date    Arthritis     Celiac disease     Chronic narcotic dependence ([facility] [address] )     Chronic pain disorder     Depression     Disease of thyroid gland     Elevated liver enzymes     history of    Fibromyalgia     GERD (gastroesophageal reflux disease)     Hypertension     Nicotine dependence     Osteopenia     Right sided weakness     RLS (restless legs syndrome)     Sepsis ([address] )     dec 2016       Past Surgical History:   Procedure Laterality Date    APPENDECTOMY      BACK SURGERY       SECTION      HYSTERECTOMY      KNEE ARTHROSCOPY Bilateral     [place] REVISE/REMOVE SPINAL NEUROSTIM/ Left 2017    Procedure: REMOVAL OF A LEFT BUTTOCK SCS IPG;  Surgeon: Lexy Nuñez MD;  Location:  MAIN OR;  Service: Neurosurgery    [place] SURG IMPLNT NEUROELECT,EPIDURAL N/A 8/3/2016    Procedure: PLACEMENT THORACIC SPINAL CORD STIMULATOR WITH LEFT BUTTOCK IMPLANTABLE PULSE GENERATOR;  Surgeon: Srikanth Kerns MD;  Location: [facility] MAIN [facility];  Service: Neurosurgery    JIAGR-EN-Y PROCEDURE      TUBAL LIGATION         ALLERGIES  Allergies   Allergen Reactions    Codeine GI Intolerance    Morphine GI Intolerance    Cortisone      Other reaction(s): Fever    Hydrocortisone Fever    Other     Penicillins      Other reaction(s): Rash       HOME MEDICATIONS  Reviewed, please refer to [facility]  SOCIAL HISTORY     Marital Status: /Civil Union   History   Alcohol Use No     History   Smoking Status    Current Every Day Smoker    Packs/day: 0 75   Smokeless Tobacco    Never Used     Comment: encouraged pt to quit,tried many things     History   Drug Use No       FAMILY HISTORY    non-contributory    OBJECTIVE    Vitals:   Blood Pressure: 144/61 (04/02/18 1645)  Pulse: (!) 54 (04/02/18 1645)  Temperature: 97 9 °F (36 6 °C) (04/02/18 1522)  Temp Source: Oral (04/02/18 1522)  Respirations: 16 (04/02/18 1645)  SpO2: 95 % (04/02/18 1645)    GENERAL: AAO x 3  HEENT: atraumatic, normocephalic  Oral mucosa moist, no icterus, pallor  PERRLA +  Neck supple, no JVD, no lymphadenopathy, no thryomegaly  CHEST: B/L breath sounds heard, occasional wheezing  CVS: S1, S2  No cyanosis/clubbing or edema  ABDOMEN: Soft/obese/NT/BS heard  NEUROLOGICAL: CN II -XI grossly intact  Right arm arm paresis 4+/5, right lower extremity paresis 4+/5  No sensory deficits  No signs of meningeal irritation or cerebellar dysfunction  EXTREMITIES: No cyanosis/clubbing or edema      LAB DATA      Results from last 7 days  Lab Units 04/02/18  1620   WBC Thousand/uL 7 56   HEMOGLOBIN g/dL 12 6   HEMATOCRIT % 36 8   PLATELETS Thousands/uL 270   NEUTROS PCT % 54   LYMPHS PCT % 35   MONOS PCT % 9   EOS PCT % 1       Results from last 7 days  Lab Units 04/02/18  1620   SODIUM mmol/L 137 POTASSIUM mmol/L 4 2   CHLORIDE mmol/L 101   CO2 mmol/L 28   BUN mg/dL 8   CREATININE mg/dL 0 76   CALCIUM mg/dL 9 3   GLUCOSE RANDOM mg/dL 88       Results from last 7 days  Lab Units 04/02/18  1620   INR  0 86       Imaging: I have personally reviewed pertinent reports  Ct Head Without Contrast    Result Date: 4/2/2018  Narrative: CT BRAIN - WITHOUT CONTRAST INDICATION:   Right-sided weakness    COMPARISON:  CT brain dated March 22, 2009  TECHNIQUE:  CT examination of the brain was performed  In addition to axial images, coronal 2D reformatted images were created and submitted for interpretation  Radiation dose length product (DLP) for this visit:  1009 mGy-cm   This examination, like all CT scans performed in the [facility], was performed utilizing techniques to minimize radiation dose exposure, including the use of iterative reconstruction and automated exposure control  IMAGE QUALITY:  Diagnostic  FINDINGS: PARENCHYMA:  No intracranial mass, mass effect or midline shift  No CT signs of acute infarction  No acute intracranial hemorrhage  [ There is mild periventricular white matter low attenuation which is nonspecific and most likely related to chronic small vessel ischemic changes  There are old bilateral basal ganglia lacunar infarcts  VENTRICLES AND EXTRA-AXIAL SPACES:  Normal for the patient's age  VISUALIZED ORBITS AND PARANASAL SINUSES:  Unremarkable  CALVARIUM AND EXTRACRANIAL SOFT TISSUES:  Normal      Impression: No acute intracranial abnormality  Mild chronic small vessel ischemic changes  Old bilateral basal ganglia lacunar infarcts   Workstation performed: [id number]       EKG, Pathology, and Other Studies Reviewed on Admission:   · EKG:Sinus rhythm with occasional Premature ventricular complexes  Otherwise normal ECG      Total time spent in the process of admission, completion records, counseling, coordination of care, discussion of him use approximately 35 minutes  [address], [place]  HOSPITALIST SERVICES  4/2/2018      PLEASE NOTE:  This encounter was completed utilizing the [employer] Direct Speech Voice Recognition Software  Grammatical errors, random word insertions, pronoun errors and incomplete sentences are occasional consequences of the system due to software limitations, ambient noise and hardware issues  These may be missed by proof reading prior to affixing electronic signature  Any questions or concerns about the content, text or information contained within the body of this dictation should be directly addressed to the physician for clarification  Please do not hesitate to call me directly if you have any any questions or concerns

## 2018-04-02 NOTE — ED PROVIDER NOTES
History  Chief Complaint   Patient presents with    Extremity Weakness     Right sided weakness with limited hand movement and dextarity on right hand  Patient reports symptoms started on friday, denies headache, changes in vision  59-year-old female presents for evaluation of right-sided weakness  The patient states that she woke Friday morning with right arm weakness that has been constant and improving since Friday  The patient has had some associated right leg weakness which she was not fully aware of due to the fact that she has had recurrent falls in the past   The patient denies any associated headache, chest pain, shortness of breath, sensory deficit  The patient denies any associated visual disturbance  History provided by:  Patient  Extremity Weakness   Associated symptoms: no chest pain and no nausea    CVA/TIA-like Symptoms   Presenting symptoms: weakness    Onset quality:  Sudden  Timing:  Constant  Progression:  Improving  Similar to previous episodes: no    Associated symptoms: fall    Associated symptoms: no chest pain, no trouble swallowing, no dizziness, no hearing loss, no bladder incontinence, no nausea, no paresthesias and no vertigo        Prior to Admission Medications   Prescriptions Last Dose Informant Patient Reported? Taking? B Complex-Folic Acid (SUPER B COMPLEX MAXI) TABS   Yes No   Sig: Take 1 tablet by mouth daily   Calcium-Magnesium-Vitamin D ER (CALCIUM 1200+D3) 600- MG-MG-UNIT TB24   Yes Yes   Sig: Take by mouth   Cholecalciferol (VITAMIN D) 2000 units tablet   Yes Yes   Sig: Take by mouth   FLUoxetine (PROzac) 10 mg capsule   Yes Yes   Sig: Take 10 mg by mouth daily     Multiple Vitamins-Minerals (HAIR/SKIN/NAILS/BIOTIN) TABS   Yes Yes   Sig: Take by mouth   amLODIPine (NORVASC) 2 5 mg tablet   No Yes   Sig: TAKE 1 TABLET BY MOUTH DAILY   buPROPion (WELLBUTRIN SR) 150 mg 12 hr tablet   Yes Yes   Sig: Take 1 tablet by mouth daily   diazepam (VALIUM) 5 mg tablet   No Yes   Sig: Take 1 tablet (5 mg total) by mouth 2 (two) times a day for 30 days   dicyclomine (BENTYL) 20 mg tablet   Yes Yes   Sig: Take 20 mg by mouth 3 (three) times a day  fentaNYL (DURAGESIC) 50 mcg/hr   No Yes   Sig: Place 1 patch on the skin every third day Earliest Fill Date: 3/13/18 Max Daily Amount: 1 patch   gabapentin (NEURONTIN) 600 MG tablet   No Yes   Sig: TAKE 1 TABLET BY MOUTH 5 TIMES DAILY   levothyroxine 25 mcg tablet   Yes Yes   Sig: Take 25 mcg by mouth daily   lisinopril (ZESTRIL) 10 mg tablet   No Yes   Sig: TAKE 1 TABLET BY MOUTH DAILY   methocarbamol (ROBAXIN) 500 mg tablet   No Yes   Sig: TAKE 1 TABLET BY MOUTH EVERY 8 HOURS AS NEEDED FOR SPASM   metoprolol succinate (TOPROL-XL) 25 mg 24 hr tablet   No Yes   Sig: TAKE 1 TABLET BY MOUTH EVERY DAY   oxyCODONE-acetaminophen (PERCOCET) 5-325 mg per tablet   No Yes   Sig: Take 1 tablet by mouth every 6 (six) hours as needed for moderate pain Earliest Fill Date: 3/5/18 Max Daily Amount: 4 tablets   oxybutynin (DITROPAN) 5 mg tablet   Yes Yes   Sig: Take 5 mg by mouth 3 (three) times a day     pantoprazole (PROTONIX) 40 mg tablet   No Yes   Sig: TAKE 2 TABLETS BY MOUTH DAILY   simethicone (MYLICON) 748 MG chewable tablet   Yes Yes   Sig: Chew 1 tablet   sucralfate (CARAFATE) 1 g tablet   Yes Yes      Facility-Administered Medications: None       Past Medical History:   Diagnosis Date    Arthritis     Celiac disease     Chronic narcotic dependence (HCC)     Chronic pain disorder     Depression     Disease of thyroid gland     Elevated liver enzymes     history of    Fibromyalgia     GERD (gastroesophageal reflux disease)     Hypertension     Nicotine dependence     Osteopenia     RLS (restless legs syndrome)     Sepsis ([address] )     dec 2016       Past Surgical History:   Procedure Laterality Date    APPENDECTOMY      BACK SURGERY       SECTION      HYSTERECTOMY      KNEE ARTHROSCOPY Bilateral     [place] REVISE/REMOVE SPINAL NEUROSTIM/ Left 7/14/2017    Procedure: REMOVAL OF A LEFT BUTTOCK SCS IPG;  Surgeon: Stan Sommer MD;  Location: [facility];  Service: Neurosurgery    [place] SURG IMPLNT [address] N/A 8/3/2016    Procedure: PLACEMENT THORACIC SPINAL CORD STIMULATOR WITH LEFT BUTTOCK IMPLANTABLE PULSE GENERATOR;  Surgeon: Stan Sommer MD;  Location: [facility];  Service: Neurosurgery    JIGAR-EN-Y PROCEDURE      TUBAL LIGATION         Family History   Problem Relation Age of Onset    Diabetes Mother     Hypertension Mother     Kidney disease Father     Lung cancer Father      I have reviewed and agree with the history as documented  Social History   Substance Use Topics    Smoking status: Current Every Day Smoker     Packs/day: 0 75    Smokeless tobacco: Never Used      Comment: encouraged pt to quit,tried many things    Alcohol use No        Review of Systems   HENT: Negative for hearing loss and trouble swallowing  Cardiovascular: Negative for chest pain  Gastrointestinal: Negative for nausea  Genitourinary: Negative for bladder incontinence  Musculoskeletal: Positive for extremity weakness and gait problem  Neurological: Positive for weakness  Negative for dizziness, vertigo, speech difficulty and paresthesias  All other systems reviewed and are negative        Physical Exam  ED Triage Vitals   Temperature Pulse Respirations Blood Pressure SpO2   04/02/18 1522 04/02/18 1522 04/02/18 1522 04/02/18 1526 04/02/18 1522   97 9 °F (36 6 °C) 66 16 159/73 95 %      Temp Source Heart Rate Source Patient Position - Orthostatic VS BP Location FiO2 (%)   04/02/18 1522 04/02/18 1522 04/02/18 1522 04/02/18 1522 --   Oral Monitor Lying Left arm       Pain Score       04/02/18 1522       No Pain           Orthostatic Vital Signs  Vitals:    04/02/18 1522 04/02/18 1526 04/02/18 1645   BP:  159/73 144/61   Pulse: 66  (!) 54   Patient Position - Orthostatic VS: Lying  Lying Physical Exam   Constitutional: She is oriented to person, place, and time  She appears well-developed and well-nourished  No distress  HENT:   Head: Normocephalic and atraumatic  Right Ear: External ear normal    Left Ear: External ear normal    Eyes: Conjunctivae and EOM are normal  Pupils are equal, round, and reactive to light  No scleral icterus  Neck: Normal range of motion  Cardiovascular: Normal rate, regular rhythm and normal heart sounds  Pulmonary/Chest: Effort normal and breath sounds normal  No respiratory distress  Abdominal: Soft  Bowel sounds are normal  There is no tenderness  There is no rebound and no guarding  Musculoskeletal: Normal range of motion  She exhibits no edema  Neurological: She is alert and oriented to person, place, and time  She exhibits abnormal muscle tone ( Right upper and lower extremity)  Skin: Skin is warm and dry  No rash noted  Psychiatric: She has a normal mood and affect  Nursing note and vitals reviewed  ED Medications  Medications   aspirin chewable tablet 324 mg (324 mg Oral Given 4/2/18 1619)   famotidine (PEPCID) tablet 20 mg (20 mg Oral Given 4/2/18 1619)       Diagnostic Studies  Results Reviewed     Procedure Component Value Units Date/Time    Protime-INR [[id number]]  (Abnormal) Collected:  04/02/18 1620    Lab Status:  Final result Specimen:  Blood from Arm, Left Updated:  04/02/18 1642     Protime 11 7 (L) seconds      INR 0 86    APTT [[id number]]  (Normal) Collected:  04/02/18 1620    Lab Status:  Final result Specimen:  Blood from Arm, Left Updated:  04/02/18 1642     PTT 35 seconds     Narrative:          Therapeutic Heparin Range = 60-90 seconds    Basic metabolic panel [[id number]] Collected:  04/02/18 1620    Lab Status:  Final result Specimen:  Blood from Arm, Left Updated:  04/02/18 1635     Sodium 137 mmol/L      Potassium 4 2 mmol/L      Chloride 101 mmol/L      CO2 28 mmol/L      Anion Gap 8 mmol/L      BUN 8 mg/dL Creatinine 0 76 mg/dL      Glucose 88 mg/dL      Calcium 9 3 mg/dL      eGFR 84 ml/min/1 73sq m     Narrative:         National Kidney Disease Education Program recommendations are as follows:  GFR calculation is accurate only with a steady state creatinine  Chronic Kidney disease less than 60 ml/min/1 73 sq  meters  Kidney failure less than 15 ml/min/1 73 sq  meters  CBC and differential [[id number]]  (Abnormal) Collected:  04/02/18 1620    Lab Status:  Final result Specimen:  Blood from Arm, Left Updated:  04/02/18 1626     WBC 7 56 Thousand/uL      RBC 4 04 Million/uL      Hemoglobin 12 6 g/dL      Hematocrit 36 8 %      MCV 91 fL      MCH 31 2 pg      MCHC 34 2 g/dL      RDW 15 6 (H) %      MPV 10 7 fL      Platelets 695 Thousands/uL      nRBC 0 /100 WBCs      Neutrophils Relative 54 %      Lymphocytes Relative 35 %      Monocytes Relative 9 %      Eosinophils Relative 1 %      Basophils Relative 1 %      Neutrophils Absolute 4 09 Thousands/µL      Lymphocytes Absolute 2 68 Thousands/µL      Monocytes Absolute 0 69 Thousand/µL      Eosinophils Absolute 0 06 Thousand/µL      Basophils Absolute 0 04 Thousands/µL                  CT head without contrast   Final Result by Anayeli Downing MD (04/02 1638)      No acute intracranial abnormality  Mild chronic small vessel ischemic changes  Old bilateral basal ganglia lacunar infarcts              Workstation performed: [id number]                    Procedures  ECG 12 Lead Documentation  Date/Time: 4/2/2018 4:17 PM  Performed by: Betsy Huerta  Authorized by: Domenico ROMERO     Indications / Diagnosis:  Stroke  ECG reviewed by me, the ED Provider: yes    Patient location:  ED  Previous ECG:     Previous ECG:  Compared to current    Comparison ECG info:  6/15/2017    Similarity:  No change  Interpretation:     Interpretation: normal    Rate:     ECG rate:  60    ECG rate assessment: normal    Rhythm:     Rhythm: sinus rhythm    Ectopy:     Ectopy: PVCs PVCs:  Unifocal  QRS:     QRS axis:  Normal    QRS intervals:  Normal  Conduction:     Conduction: normal    ST segments:     ST segments:  Normal  T waves:     T waves: normal             Phone Contacts  ED Phone Contact    ED Course  ED Course as of Apr 02 1657   Mon Apr 02, 2018   1648 4:52 PM  I discussed the case with the hospitalist  We reviewed the HPI, pertinent PMH, ED course and workup  Hospitalist agreed with plan and will admit the patient to the hospital                 NIH Stroke Scale    Flowsheet Row Most Recent Value   Level of Consciousness (1a )  0 Filed at: 04/02/2018 1615   LOC Questions (1b )  0 Filed at: 04/02/2018 1615   LOC Commands (1c )  0 Filed at: 04/02/2018 1615   Best Gaze (2 )  0 Filed at: 04/02/2018 1615   Visual (3 )  0 Filed at: 04/02/2018 1615   Facial Palsy (4 )  No data   Motor Arm, Left (5a )  0 Filed at: 04/02/2018 1615   Motor Arm, Right (5b )  1 Filed at: 04/02/2018 1615   Motor Leg, Left (6a )  0 Filed at: 04/02/2018 1615   Motor Leg, Right (6b )  1 Filed at: 04/02/2018 1615   Limb Ataxia (7 )  1 Filed at: 04/02/2018 1615   Sensory (8 )  0 Filed at: 04/02/2018 1615   Best Language (9 )  0 Filed at: 04/02/2018 1615   Dysarthria (10 )  0 Filed at: 04/02/2018 1615   Extinction and Inattention (11 ) (Formerly Neglect)  0 Filed at: 04/02/2018 1615   Total  No data                        MDM  Number of Diagnoses or Management Options  Hypertension: new and requires workup  Stroke (cerebrum) ([facility] [address] ): new and requires workup  Diagnosis management comments: 70-year-old female presents for evaluation of new right-sided weakness which began 3 days ago  The patient's history and examination are consistent with stroke  Given the duration of symptoms and the gradual improvement over the past 3 days the patient is not a tPA candidate      All labs reviewed and utilized in the medical decision making process    All radiology studies independently viewed by me and interpreted by the radiologist          Amount and/or Complexity of Data Reviewed  Clinical lab tests: ordered and reviewed  Tests in the radiology section of CPT®: ordered and reviewed  Tests in the medicine section of CPT®: reviewed and ordered  Review and summarize past medical records: yes (Office note from today reviewed)  Discuss the patient with other providers: yes  Independent visualization of images, tracings, or specimens: yes      CritCare Time    Disposition  Final diagnoses:   Stroke (cerebrum) ([facility] [address] )   Hypertension     Time reflects when diagnosis was documented in both MDM as applicable and the Disposition within this note     Time User Action Codes Description Comment    4/2/2018  4:18 PM Joe Soramesh Add [I63 9] Stroke (cerebrum) ([facility] [address] )     4/2/2018  4:18 PM Sina Rai 51 Hypertension       ED Disposition     ED Disposition Condition Comment    Admit  Case was discussed with Dr Garfield Mars and the patient's admission status was agreed to be Admission Status: inpatient status to the service of Dr Garfield Mars   Follow-up Information    None       Patient's Medications   Discharge Prescriptions    No medications on file     No discharge procedures on file      ED Provider  Electronically Signed by           Tamika Kessler DO  04/02/18 9133

## 2018-04-02 NOTE — ASSESSMENT & PLAN NOTE
Right sided weakness, main complaint is right hand  Strength on right lower extremity diminished as well  This started Friday  Has had some improvement in mobility and strength but still not back to normal  Facial features symmetrical  There is high concern for CVA  Patient instructed to go to the hospital  Her  will drive [facility] to [facility]  I personally called ahead

## 2018-04-02 NOTE — PROGRESS NOTES
Assessment/Plan:    Right sided weakness  Right sided weakness, main complaint is right hand  Strength on right lower extremity diminished as well  This started Friday  Has had some improvement in mobility and strength but still not back to normal  Facial features symmetrical  There is high concern for CVA  Patient instructed to go to the hospital  Her  will drive her to [facility]  I personally called ahead  Diagnoses and all orders for this visit:    Right sided weakness        Patient Instructions   Go to hospital for stroke work up  We will call ahead  Patient verbalizes understand and agrees with treatment plan  Subjective:        Patient ID: Jessica Staples is a 58 y o  female  Chief Complaint   Patient presents with    Hand Pain     R hand x 3 days; denies injury       Patient presents to office today for right hand problem  She states that she woke up Friday morning with some right hand weakness  She is unable to use her hand for most things  She is unable to control movements  She has noticed some improvement since Friday but is still pretty weak and she is having trouble with dexterity  She is right handed  She does have chronic issues with her left hand and wrist     Patient denies any injury or event of overuse  No other symptoms  Hand Pain    Pertinent negatives include no chest pain  The following portions of the patient's history were reviewed and updated as appropriate: allergies, current medications, past family history, past social history and problem list     Review of Systems   Constitutional: Negative for chills and fever  HENT: Negative for congestion  Respiratory: Negative for shortness of breath  Cardiovascular: Negative for chest pain  Gastrointestinal: Negative for abdominal pain  Genitourinary: Negative for difficulty urinating  Skin: Negative for rash     Neurological:        Right hand weakness           Objective:  BP 120/64 (BP Location: Left arm, Patient Position: Sitting, Cuff Size: Standard)   Ht 5' 5" (1 651 m)   Wt 71 kg (156 lb 9 6 oz)   BMI 26 06 kg/m²      Physical Exam   Constitutional: She is oriented to person, place, and time  She appears well-developed  No distress  HENT:   Head: Normocephalic and atraumatic  Eyes: Conjunctivae and lids are normal  Pupils are equal, round, and reactive to light  Right eye exhibits no discharge  Left eye exhibits no discharge  3mm   Neck: Neck supple  No tracheal deviation present  Cardiovascular: Normal rate and regular rhythm  No murmur heard  Pulmonary/Chest: Effort normal and breath sounds normal  No respiratory distress  She has no wheezes  Musculoskeletal: She exhibits no edema or deformity  Lymphadenopathy:     She has no cervical adenopathy  Neurological: She is alert and oriented to person, place, and time  A cranial nerve deficit is present  Symmetrical smile/ frown/tongue  PEERL 3mm   Left upper/lower extremity +4 strength  Right upper/lower extremity +3 strength   Impaired movement of right hand while extending hand     Neuro otherwise intact      Skin: Skin is warm and dry  No rash noted  She is not diaphoretic  No erythema  Psychiatric: She has a normal mood and affect  Her speech is normal and behavior is normal  Judgment and thought content normal  Cognition and memory are normal    Nursing note and vitals reviewed

## 2018-04-02 NOTE — PLAN OF CARE
Problem: PAIN - ADULT  Goal: Verbalizes/displays adequate comfort level or baseline comfort level  Interventions:  - Encourage patient to monitor pain and request assistance  - Assess pain using appropriate pain scale  - Administer analgesics based on type and severity of pain and evaluate response  - Implement non-pharmacological measures as appropriate and evaluate response  - Consider cultural and social influences on pain and pain management  - Notify physician/advanced practitioner if interventions unsuccessful or patient reports new pain  Outcome: Progressing      Problem: INFECTION - ADULT  Goal: Absence or prevention of progression during hospitalization  INTERVENTIONS:  - Assess and monitor for signs and symptoms of infection  - Monitor lab/diagnostic results  - Monitor all insertion sites, i e  indwelling lines, tubes, and drains  - Monitor endotracheal (as able) and nasal secretions for changes in amount and color  - Gilman appropriate cooling/warming therapies per order  - Administer medications as ordered  - Instruct and encourage patient and family to use good hand hygiene technique  - Identify and instruct in appropriate isolation precautions for identified infection/condition  Outcome: Progressing    Goal: Absence of fever/infection during neutropenic period  INTERVENTIONS:  - Monitor WBC  - Implement neutropenic guidelines  Outcome: Progressing      Problem: SAFETY ADULT  Goal: Maintain or return to baseline ADL function  INTERVENTIONS:  -  Assess patient's ability to carry out ADLs; assess patient's baseline for ADL function and identify physical deficits which impact ability to perform ADLs (bathing, care of mouth/teeth, toileting, grooming, dressing, etc )  - Assess/evaluate cause of self-care deficits   - Assess range of motion  - Assess patient's mobility; develop plan if impaired  - Assess patient's need for assistive devices and provide as appropriate  - Encourage maximum independence but intervene and supervise when necessary  ¯ Involve family in performance of ADLs  ¯ Assess for home care needs following discharge   ¯ Request OT consult to assist with ADL evaluation and planning for discharge  ¯ Provide patient education as appropriate  Outcome: Progressing    Goal: Maintain or return mobility status to optimal level  INTERVENTIONS:  - Assess patient's baseline mobility status (ambulation, transfers, stairs, etc )    - Identify cognitive and physical deficits and behaviors that affect mobility  - Identify mobility aids required to assist with transfers and/or ambulation (gait belt, sit-to-stand, lift, walker, cane, etc )  - Arnold fall precautions as indicated by assessment  - Record patient progress and toleration of activity level on Mobility SBAR; progress patient to next Phase/Stage  - Instruct patient to call for assistance with activity based on assessment  - Request Rehabilitation consult to assist with strengthening/weightbearing, etc   Outcome: Progressing      Problem: DISCHARGE PLANNING  Goal: Discharge to home or other facility with appropriate resources  INTERVENTIONS:  - Identify barriers to discharge w/patient and caregiver  - Arrange for needed discharge resources and transportation as appropriate  - Identify discharge learning needs (meds, wound care, etc )  - Arrange for interpretive services to assist at discharge as needed  - Refer to Case Management Department for coordinating discharge planning if the patient needs post-hospital services based on physician/advanced practitioner order or complex needs related to functional status, cognitive ability, or social support system  Outcome: Progressing      Problem: Knowledge Deficit  Goal: Patient/family/caregiver demonstrates understanding of disease process, treatment plan, medications, and discharge instructions  Complete learning assessment and assess knowledge base    Interventions:  - Provide teaching at level of understanding  - Provide teaching via preferred learning methods  Outcome: Progressing      Problem: NEUROSENSORY - ADULT  Goal: Achieves stable or improved neurological status  INTERVENTIONS  - Monitor and report changes in neurological status  - Initiate measures to prevent increased intracranial pressure  - Maintain blood pressure and fluid volume within ordered parameters to optimize cerebral perfusion  - Monitor temperature, glucose, and sodium or any other associated labs   Initiate appropriate interventions as ordered  - Monitor for seizure activity   - Administer anti-seizure medications as ordered  Outcome: Progressing    Goal: Absence of seizures  INTERVENTIONS  - Monitor for seizure activity  - Administer anti-seizure medications as ordered  - Monitor neurological status  Outcome: Progressing    Goal: Remains free of injury related to seizures activity  INTERVENTIONS  - Maintain airway, patient safety  and administer oxygen as ordered  - Monitor patient for seizure activity, document and report duration and description of seizure to physician/advanced practitioner  - If seizure occurs,  ensure patient safety during seizure  - Reorient patient post seizure  - Seizure pads on all 4 side rails  - Instruct patient/family to notify RN of any seizure activity including if an aura is experienced  - Instruct patient/family to call for assistance with activity based on nursing assessment  - Administer anti-seizure medications as ordered  - Monitor fetal well being  Outcome: Progressing    Goal: Achieves maximal functionality and self care  INTERVENTIONS  - Monitor swallowing and airway patency with patient fatigue and changes in neurological status  - Encourage and assist patient to increase activity and self care with guidance from rehab services  - Encourage visually impaired, hearing impaired and aphasic patients to use assistive/communication devices  Outcome: Progressing      Problem: METABOLIC, FLUID AND ELECTROLYTES - ADULT  Goal: Electrolytes maintained within normal limits  INTERVENTIONS:  - Monitor labs and assess patient for signs and symptoms of electrolyte imbalances  - Administer electrolyte replacement as ordered  - Monitor response to electrolyte replacements, including repeat lab results as appropriate  - Instruct patient on fluid and nutrition as appropriate  Outcome: Progressing    Goal: Fluid balance maintained  INTERVENTIONS:  - Monitor labs and assess for signs and symptoms of volume excess or deficit  - Monitor I/O and WT  - Instruct patient on fluid and nutrition as appropriate  Outcome: Progressing    Goal: Glucose maintained within target range  INTERVENTIONS:  - Monitor Blood Glucose as ordered  - Assess for signs and symptoms of hyperglycemia and hypoglycemia  - Administer ordered medications to maintain glucose within target range  - Assess nutritional intake and initiate nutrition service referral as needed  Outcome: Progressing      Problem: SKIN/TISSUE INTEGRITY - ADULT  Goal: Skin integrity remains intact  INTERVENTIONS  - Identify patients at risk for skin breakdown  - Assess and monitor skin integrity  - Assess and monitor nutrition and hydration status  - Monitor labs (i e  albumin)  - Assess for incontinence   - Turn and reposition patient  - Assist with mobility/ambulation  - Relieve pressure over bony prominences  - Avoid friction and shearing  - Provide appropriate hygiene as needed including keeping skin clean and dry  - Evaluate need for skin moisturizer/barrier cream  - Collaborate with interdisciplinary team (i e  Nutrition, Rehabilitation, etc )   - Patient/family teaching  Outcome: Progressing    Goal: Incision(s), wounds(s) or drain site(s) healing without S/S of infection  INTERVENTIONS  - Assess and document risk factors for skin impairment   - Assess and document dressing, incision, wound bed, drain sites and surrounding tissue  - Initiate Nutrition services consult and/or wound management as needed  Outcome: Progressing    Goal: Oral mucous membranes remain intact  INTERVENTIONS  - Assess oral mucosa and hygiene practices  - Implement preventative oral hygiene regimen  - Implement oral medicated treatments as ordered  - Initiate Nutrition services referral as needed  Outcome: Progressing      Problem: MUSCULOSKELETAL - ADULT  Goal: Maintain or return mobility to safest level of function  INTERVENTIONS:  - Assess patient's ability to carry out ADLs; assess patient's baseline for ADL function and identify physical deficits which impact ability to perform ADLs (bathing, care of mouth/teeth, toileting, grooming, dressing, etc )  - Assess/evaluate cause of self-care deficits   - Assess range of motion  - Assess patient's mobility; develop plan if impaired  - Assess patient's need for assistive devices and provide as appropriate  - Encourage maximum independence but intervene and supervise when necessary  - Involve family in performance of ADLs  - Assess for home care needs following discharge   - Request OT consult to assist with ADL evaluation and planning for discharge  - Provide patient education as appropriate  Outcome: Progressing    Goal: Maintain proper alignment of affected body part  INTERVENTIONS:  - Support, maintain and protect limb and body alignment  - Provide pt/fam with appropriate education  Outcome: Progressing

## 2018-04-03 ENCOUNTER — APPOINTMENT (INPATIENT)
Dept: CT IMAGING | Facility: HOSPITAL | Age: 63
DRG: 065 | End: 2018-04-03
Payer: COMMERCIAL

## 2018-04-03 ENCOUNTER — APPOINTMENT (INPATIENT)
Dept: NON INVASIVE DIAGNOSTICS | Facility: HOSPITAL | Age: 63
DRG: 065 | End: 2018-04-03
Payer: COMMERCIAL

## 2018-04-03 LAB
ANION GAP SERPL CALCULATED.3IONS-SCNC: 10 MMOL/L (ref 4–13)
BUN SERPL-MCNC: 7 MG/DL (ref 5–25)
CALCIUM SERPL-MCNC: 8.4 MG/DL (ref 8.3–10.1)
CHLORIDE SERPL-SCNC: 108 MMOL/L (ref 100–108)
CHOLEST SERPL-MCNC: 185 MG/DL (ref 50–200)
CO2 SERPL-SCNC: 26 MMOL/L (ref 21–32)
CREAT SERPL-MCNC: 0.63 MG/DL (ref 0.6–1.3)
ERYTHROCYTE [DISTWIDTH] IN BLOOD BY AUTOMATED COUNT: 15.6 % (ref 11.6–15.1)
EST. AVERAGE GLUCOSE BLD GHB EST-MCNC: 108 MG/DL
GFR SERPL CREATININE-BSD FRML MDRD: 96 ML/MIN/1.73SQ M
GLUCOSE SERPL-MCNC: 92 MG/DL (ref 65–140)
HBA1C MFR BLD: 5.4 % (ref 4.2–6.3)
HCT VFR BLD AUTO: 35.3 % (ref 34.8–46.1)
HDLC SERPL-MCNC: 53 MG/DL (ref 40–60)
HGB BLD-MCNC: 12 G/DL (ref 11.5–15.4)
LDLC SERPL CALC-MCNC: 116 MG/DL (ref 0–100)
MAGNESIUM SERPL-MCNC: 1.9 MG/DL (ref 1.6–2.6)
MCH RBC QN AUTO: 30.9 PG (ref 26.8–34.3)
MCHC RBC AUTO-ENTMCNC: 34 G/DL (ref 31.4–37.4)
MCV RBC AUTO: 91 FL (ref 82–98)
PHOSPHATE SERPL-MCNC: 3.5 MG/DL (ref 2.3–4.1)
PLATELET # BLD AUTO: 264 THOUSANDS/UL (ref 149–390)
PMV BLD AUTO: 10.4 FL (ref 8.9–12.7)
POTASSIUM SERPL-SCNC: 3.9 MMOL/L (ref 3.5–5.3)
RBC # BLD AUTO: 3.88 MILLION/UL (ref 3.81–5.12)
SODIUM SERPL-SCNC: 144 MMOL/L (ref 136–145)
TRIGL SERPL-MCNC: 80 MG/DL
TROPONIN I SERPL-MCNC: <0.02 NG/ML
TSH SERPL DL<=0.05 MIU/L-ACNC: 1.78 UIU/ML (ref 0.36–3.74)
WBC # BLD AUTO: 5.45 THOUSAND/UL (ref 4.31–10.16)

## 2018-04-03 PROCEDURE — G8978 MOBILITY CURRENT STATUS: HCPCS

## 2018-04-03 PROCEDURE — 80048 BASIC METABOLIC PNL TOTAL CA: CPT | Performed by: INTERNAL MEDICINE

## 2018-04-03 PROCEDURE — 97167 OT EVAL HIGH COMPLEX 60 MIN: CPT

## 2018-04-03 PROCEDURE — 84100 ASSAY OF PHOSPHORUS: CPT | Performed by: INTERNAL MEDICINE

## 2018-04-03 PROCEDURE — 83036 HEMOGLOBIN GLYCOSYLATED A1C: CPT | Performed by: INTERNAL MEDICINE

## 2018-04-03 PROCEDURE — 99232 SBSQ HOSP IP/OBS MODERATE 35: CPT | Performed by: INTERNAL MEDICINE

## 2018-04-03 PROCEDURE — 70450 CT HEAD/BRAIN W/O DYE: CPT

## 2018-04-03 PROCEDURE — 85027 COMPLETE CBC AUTOMATED: CPT | Performed by: INTERNAL MEDICINE

## 2018-04-03 PROCEDURE — 70498 CT ANGIOGRAPHY NECK: CPT

## 2018-04-03 PROCEDURE — 93880 EXTRACRANIAL BILAT STUDY: CPT | Performed by: SURGERY

## 2018-04-03 PROCEDURE — 84443 ASSAY THYROID STIM HORMONE: CPT | Performed by: INTERNAL MEDICINE

## 2018-04-03 PROCEDURE — 93880 EXTRACRANIAL BILAT STUDY: CPT

## 2018-04-03 PROCEDURE — 83735 ASSAY OF MAGNESIUM: CPT | Performed by: INTERNAL MEDICINE

## 2018-04-03 PROCEDURE — G8988 SELF CARE GOAL STATUS: HCPCS

## 2018-04-03 PROCEDURE — 70496 CT ANGIOGRAPHY HEAD: CPT

## 2018-04-03 PROCEDURE — 97163 PT EVAL HIGH COMPLEX 45 MIN: CPT

## 2018-04-03 PROCEDURE — G8979 MOBILITY GOAL STATUS: HCPCS

## 2018-04-03 PROCEDURE — G8987 SELF CARE CURRENT STATUS: HCPCS

## 2018-04-03 PROCEDURE — 84484 ASSAY OF TROPONIN QUANT: CPT | Performed by: INTERNAL MEDICINE

## 2018-04-03 PROCEDURE — 99223 1ST HOSP IP/OBS HIGH 75: CPT | Performed by: PSYCHIATRY & NEUROLOGY

## 2018-04-03 PROCEDURE — 80061 LIPID PANEL: CPT | Performed by: INTERNAL MEDICINE

## 2018-04-03 RX ORDER — ATORVASTATIN CALCIUM 80 MG/1
80 TABLET, FILM COATED ORAL EVERY EVENING
Status: DISCONTINUED | OUTPATIENT
Start: 2018-04-03 | End: 2018-04-04 | Stop reason: HOSPADM

## 2018-04-03 RX ORDER — SUCRALFATE ORAL 1 G/10ML
1000 SUSPENSION ORAL EVERY 6 HOURS SCHEDULED
Status: DISCONTINUED | OUTPATIENT
Start: 2018-04-03 | End: 2018-04-04 | Stop reason: HOSPADM

## 2018-04-03 RX ADMIN — SUCRALFATE 1000 MG: 1 SUSPENSION ORAL at 12:38

## 2018-04-03 RX ADMIN — ENOXAPARIN SODIUM 40 MG: 40 INJECTION SUBCUTANEOUS at 09:13

## 2018-04-03 RX ADMIN — GABAPENTIN 600 MG: 300 CAPSULE ORAL at 21:22

## 2018-04-03 RX ADMIN — LEVOTHYROXINE SODIUM 25 MCG: 25 TABLET ORAL at 06:09

## 2018-04-03 RX ADMIN — OXYBUTYNIN CHLORIDE 5 MG: 5 TABLET ORAL at 09:12

## 2018-04-03 RX ADMIN — DOCUSATE SODIUM 100 MG: 100 CAPSULE, LIQUID FILLED ORAL at 17:00

## 2018-04-03 RX ADMIN — ASPIRIN 81 MG 81 MG: 81 TABLET ORAL at 09:12

## 2018-04-03 RX ADMIN — METOPROLOL SUCCINATE 25 MG: 25 TABLET, EXTENDED RELEASE ORAL at 09:11

## 2018-04-03 RX ADMIN — SENNOSIDES 8.6 MG: 8.6 TABLET, FILM COATED ORAL at 09:12

## 2018-04-03 RX ADMIN — DIAZEPAM 5 MG: 5 TABLET ORAL at 17:00

## 2018-04-03 RX ADMIN — SUCRALFATE 1000 MG: 1 SUSPENSION ORAL at 09:12

## 2018-04-03 RX ADMIN — DICYCLOMINE HYDROCHLORIDE 20 MG: 20 TABLET ORAL at 16:59

## 2018-04-03 RX ADMIN — SODIUM CHLORIDE 75 ML/HR: 0.9 INJECTION, SOLUTION INTRAVENOUS at 20:00

## 2018-04-03 RX ADMIN — ACETAMINOPHEN 650 MG: 325 TABLET, FILM COATED ORAL at 06:09

## 2018-04-03 RX ADMIN — BUPROPION HYDROCHLORIDE 150 MG: 150 TABLET, EXTENDED RELEASE ORAL at 09:13

## 2018-04-03 RX ADMIN — ACETAMINOPHEN 650 MG: 325 TABLET, FILM COATED ORAL at 14:10

## 2018-04-03 RX ADMIN — ACETAMINOPHEN 650 MG: 325 TABLET, FILM COATED ORAL at 21:21

## 2018-04-03 RX ADMIN — LISINOPRIL 10 MG: 10 TABLET ORAL at 09:11

## 2018-04-03 RX ADMIN — FENTANYL 1 PATCH: 50 PATCH, EXTENDED RELEASE TRANSDERMAL at 09:24

## 2018-04-03 RX ADMIN — DIAZEPAM 5 MG: 5 TABLET ORAL at 09:11

## 2018-04-03 RX ADMIN — OXYCODONE HYDROCHLORIDE AND ACETAMINOPHEN 1 TABLET: 5; 325 TABLET ORAL at 09:24

## 2018-04-03 RX ADMIN — SUCRALFATE 1000 MG: 1 SUSPENSION ORAL at 23:18

## 2018-04-03 RX ADMIN — METHOCARBAMOL 500 MG: 500 TABLET ORAL at 09:24

## 2018-04-03 RX ADMIN — SODIUM CHLORIDE 75 ML/HR: 0.9 INJECTION, SOLUTION INTRAVENOUS at 06:09

## 2018-04-03 RX ADMIN — AMLODIPINE BESYLATE 2.5 MG: 2.5 TABLET ORAL at 09:11

## 2018-04-03 RX ADMIN — DOCUSATE SODIUM 100 MG: 100 CAPSULE, LIQUID FILLED ORAL at 09:11

## 2018-04-03 RX ADMIN — DICYCLOMINE HYDROCHLORIDE 20 MG: 20 TABLET ORAL at 21:21

## 2018-04-03 RX ADMIN — PANTOPRAZOLE SODIUM 40 MG: 40 TABLET, DELAYED RELEASE ORAL at 06:09

## 2018-04-03 RX ADMIN — ATORVASTATIN CALCIUM 80 MG: 80 TABLET, FILM COATED ORAL at 17:02

## 2018-04-03 RX ADMIN — SUCRALFATE 1000 MG: 1 SUSPENSION ORAL at 17:00

## 2018-04-03 RX ADMIN — DICYCLOMINE HYDROCHLORIDE 20 MG: 20 TABLET ORAL at 09:12

## 2018-04-03 RX ADMIN — GABAPENTIN 600 MG: 300 CAPSULE ORAL at 16:59

## 2018-04-03 RX ADMIN — GABAPENTIN 600 MG: 300 CAPSULE ORAL at 09:11

## 2018-04-03 RX ADMIN — OXYBUTYNIN CHLORIDE 10 MG: 5 TABLET ORAL at 21:21

## 2018-04-03 RX ADMIN — IOHEXOL 85 ML: 350 INJECTION, SOLUTION INTRAVENOUS at 17:45

## 2018-04-03 RX ADMIN — FLUOXETINE 10 MG: 10 CAPSULE ORAL at 09:11

## 2018-04-03 RX ADMIN — POLYETHYLENE GLYCOL 3350 17 G: 17 POWDER, FOR SOLUTION ORAL at 09:12

## 2018-04-03 NOTE — CONSULTS
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 with RUE hand ataxia involving medial, ulnar and radial nerves,  RLE weakness, and multiple risk factors for stroke including current smoker, HTN, prior TIA and family history of DM and cardiac disease vs less likely carpal tunnel syndrome  Patient states she can not get an MRI  SCS IPG neurostimulator removed in 7/14/17 but lead wires remain due to the location they were difficult to remove  CT Head wo contrast demonstrating mild chronic small vessel ischemic changes and old bilateral basal ganglia lacunar infarcts but no acute intracranial abnormalities  , HDL 53, Triglycerides 80, TSH 1 778  -continue stroke pathway  -telemetry monitoring  -repeat CT Head wo contrast complete report pending  -Echo and CTA head and neck pending  -continue ASA 81mg and liptor 80 mg  -HgA1C  -PT/OT eval and treat; appreciate recommendations  -stroke education      HPI: Jodie Manzanares is a right handed  58 y o  female with pmh of HTN, fibromyalgia, gastric bypass with malabsorption syndrome, chronic back pain s/p neurostimulator removal, restless leg syndrome, peripheral neuropathywho presented to the ED on 4/2/18 after a visit to her PCP for right hand ataxia  Patient states she woke up on Friday morning unable to move her right hand and wrist only, it did not extend up her arm  She does not recall any other symptoms being present, no numbness, tingling, headache, visual changes   She states she has peripheral neuropathy and a history of multiple falls, so she did not notice any lower extremity weakness  She states her right hand seemed to improve slightly over the weekend, but she still had weakness and clumsiness on Monday, so she went to her PCP who sent her to the ED for further evaluation         ROS:  History obtained from chart review and the patient  General ROS: negative for - chills, fever or malaise  Psychological ROS: positive for - anxiety  negative for - disorientation or memory difficulties  ENT ROS: positive for - patient reports esophagus issues and hoarseness s/p gastric bypass surgery  negative for - headaches, hearing change or visual changes  Respiratory ROS: no cough, shortness of breath, or wheezing  Cardiovascular ROS: no chest pain or dyspnea on exertion  Gastrointestinal ROS: positive for - constipation (from narcotics for chronic pain) and motility issues, gas/bloating and stool incontinence  negative for - abdominal pain or nausea/vomiting currently  Genito-Urinary ROS: positive for - incontinence currently on Ditropan  Musculoskeletal ROS: positive for - muscular weakness B/L lower extremities, gait disturbance "patient reported peripheral neuropathy and multiple falls"  Neurological ROS: positive for - weakness  negative for - confusion, dizziness, headaches, numbness/tingling, speech problems or visual changes  Dermatological ROS: positive for thrush on tongue      NIH Stroke Scale    Flowsheet Row Most Recent Value   Level of Consciousness (1a )  0 Filed at: 04/02/2018 1800   LOC Questions (1b )  0 Filed at: 04/02/2018 1800   LOC Commands (1c )  0 Filed at: 04/02/2018 1800   Best Gaze (2 )  0 Filed at: 04/02/2018 1800   Visual (3 )  0 Filed at: 04/02/2018 1800   Facial Palsy (4 )  0 Filed at: 04/02/2018 1800   Motor Arm, Left (5a )  0 Filed at: 04/02/2018 1800   Motor Arm, Right (5b )  0 Filed at: 04/02/2018 1800   Motor Leg, Left (6a )  0 Filed at: 04/02/2018 1800   Motor Leg, Right (6b )  0 Filed at: 04/02/2018 1800   Limb Ataxia (7 ) 1 Filed at: 2018 1800   Sensory (8 )  0 Filed at: 2018 1800   Best Language (9 )  0 Filed at: 2018 1800   Dysarthria (10 )  0 Filed at: 2018 1800   Extinction and Inattention (11 ) (Formerly Neglect)  0 Filed at: 2018 1800   Total  1 Filed at: 2018 1800            Historical Information     Past Medical History:   Diagnosis Date    Arthritis     Celiac disease     Chronic narcotic dependence ([facility] [address] )     Chronic pain disorder     Depression     Disease of thyroid gland     Elevated liver enzymes     history of    Fibromyalgia     GERD (gastroesophageal reflux disease)     Hypertension     Nicotine dependence     Osteopenia     Right sided weakness     RLS (restless legs syndrome)     Sepsis ([facility] [address] )     dec 2016     Past Surgical History:   Procedure Laterality Date    APPENDECTOMY      BACK SURGERY       SECTION      HYSTERECTOMY      KNEE ARTHROSCOPY Bilateral     ID REVISE/REMOVE SPINAL NEUROSTIM/ Left 2017    Procedure: REMOVAL OF A LEFT BUTTOCK SCS IPG;  Surgeon: Juan Alfredo MD;  Location:  MAIN OR;  Service: Neurosurgery    ID SURG IMPLNT [address] N/A 8/3/2016    Procedure: PLACEMENT THORACIC SPINAL CORD STIMULATOR WITH LEFT BUTTOCK IMPLANTABLE PULSE GENERATOR;  Surgeon: Juan Alfredo MD;  Location: [facility];  Service: Neurosurgery    JIGAR-EN-Y PROCEDURE      TUBAL LIGATION         Social History   Current every day smoker: 0 75 packs/day  No alcohol use  No illicit drug use      Family History:   Family History   Problem Relation Age of Onset    Diabetes Mother     Hypertension Mother     Kidney disease Father     Lung cancer Father          Allergies   Allergen Reactions    Codeine GI Intolerance    Morphine GI Intolerance    Cortisone      Other reaction(s): Fever    Hydrocortisone Fever    Other     Penicillins      Other reaction(s): Rash     Meds:  all current active meds have been reviewed and PTA meds:   Prior to Admission Medications   Prescriptions Last Dose Informant Patient Reported? Taking? B Complex-Folic Acid (SUPER B COMPLEX MAXI) TABS   Yes No   Sig: Take 1 tablet by mouth daily   FLUoxetine (PROzac) 10 mg capsule  at 1400  Yes Yes   Sig: Take 10 mg by mouth daily  Multiple Vitamins-Minerals (HAIR/SKIN/NAILS/BIOTIN) TABS   Yes Yes   Sig: Take by mouth   amLODIPine (NORVASC) 2 5 mg tablet   No Yes   Sig: TAKE 1 TABLET BY MOUTH DAILY   buPROPion (WELLBUTRIN SR) 150 mg 12 hr tablet   Yes Yes   Sig: Take 1 tablet by mouth daily   diazepam (VALIUM) 5 mg tablet   No Yes   Sig: Take 1 tablet (5 mg total) by mouth 2 (two) times a day for 30 days   dicyclomine (BENTYL) 20 mg tablet   Yes Yes   Sig: Take 20 mg by mouth 3 (three) times a day  fentaNYL (DURAGESIC) 50 mcg/hr   No Yes   Sig: Place 1 patch on the skin every third day Earliest Fill Date: 3/13/18 Max Daily Amount: 1 patch   gabapentin (NEURONTIN) 600 MG tablet   No Yes   Sig: TAKE 1 TABLET BY MOUTH 5 TIMES DAILY   levothyroxine 25 mcg tablet   Yes Yes   Sig: Take 25 mcg by mouth daily   lisinopril (ZESTRIL) 10 mg tablet   No Yes   Sig: TAKE 1 TABLET BY MOUTH DAILY   methocarbamol (ROBAXIN) 500 mg tablet   No Yes   Sig: TAKE 1 TABLET BY MOUTH EVERY 8 HOURS AS NEEDED FOR SPASM   metoprolol succinate (TOPROL-XL) 25 mg 24 hr tablet   No Yes   Sig: TAKE 1 TABLET BY MOUTH EVERY DAY   oxyCODONE-acetaminophen (PERCOCET) 5-325 mg per tablet   No Yes   Sig: Take 1 tablet by mouth every 6 (six) hours as needed for moderate pain Earliest Fill Date: 3/5/18 Max Daily Amount: 4 tablets   oxybutynin (DITROPAN) 5 mg tablet   Yes Yes   Sig: Take 5 mg by mouth 3 (three) times a day     pantoprazole (PROTONIX) 40 mg tablet  at 1400  No Yes   Sig: TAKE 2 TABLETS BY MOUTH DAILY   sucralfate (CARAFATE) 1 g tablet   Yes Yes   Sig: Take 1 g by mouth 4 (four) times a day      Facility-Administered Medications: None       Scheduled Meds:  Current Facility-Administered Medications:  acetaminophen 650 mg Oral Q8H Richardean Bamberger, MD    amLODIPine 2 5 mg Oral Daily Floridalma Pitch, MD    aspirin 81 mg Oral Daily Floridalma Pitch, MD    atorvastatin 40 mg Oral QPM Floridalma Pitch, MD    buPROPion 150 mg Oral Daily Abelardo Calvert, MD    diazepam 5 mg Oral BID Floridalma Pitch, MD    dicyclomine 20 mg Oral TID Floridalma Pitch, MD    docusate sodium 100 mg Oral BID Floridalma Pitch, MD    enoxaparin 40 mg Subcutaneous Daily Floridalma Pitch, MD    fentaNYL 1 patch Transdermal Q72H Floridalma Pitch, MD    FLUoxetine 10 mg Oral Daily Abelardo Calvert, MD    gabapentin 600 mg Oral TID Floridalma Pitch, MD    levothyroxine 25 mcg Oral Early Morning Floridalma Pitch, MD    lisinopril 10 mg Oral Daily Floridalma Pitch, MD    methocarbamol 500 mg Oral Q8H PRN Floridalma Pitch, MD    metoprolol succinate 25 mg Oral Daily Floridalma Pitch, MD    nicotine 1 patch Transdermal Daily Floridalma Pitch, MD    ondansetron 4 mg Intravenous Q6H PRN Floridalma Pitch, MD    oxybutynin 10 mg Oral HS Vivienne Mccurdy PA-C    oxybutynin 5 mg Oral Daily Vivienne Mccurdy PA-C    oxyCODONE-acetaminophen 1 tablet Oral Q6H PRN Floridalma Pitch, MD    pantoprazole 40 mg Oral Daily Before Breakfast Floridalma Pitch, MD    polyethylene glycol 17 g Oral Daily Floridalma Pitch, MD    senna 1 tablet Oral Daily Abelardo Calvert MD    sodium chloride 75 mL/hr Intravenous Continuous [facility], MD Last Rate: 75 mL/hr (04/03/18 0609)   sucralfate 1,000 mg Oral Q6H [address] Abelardo Calvert MD      PRN Meds: methocarbamol    ondansetron    oxyCODONE-acetaminophen        Physical Exam:   Objective   Vitals:Blood pressure (!) 176/79, pulse 58, temperature (!) 97 2 °F (36 2 °C), temperature source Temporal, resp  rate 18, height 5' 5" (1 651 m), weight 70 3 kg (154 lb 15 7 oz), SpO2 99 %  ,Body mass index is 25 79 kg/m²       Physical Exam   Constitutional: She is oriented to person, place, and time  No distress  HENT:   Head: Normocephalic and atraumatic  Mouth/Throat: Oropharyngeal exudate (thrush) present  Eyes: EOM are normal  Pupils are equal, round, and reactive to light  Neck: Normal range of motion  Neck supple  Cardiovascular: Normal rate, regular rhythm and normal heart sounds  Pulmonary/Chest: Effort normal and breath sounds normal  No respiratory distress  Abdominal: Soft  Bowel sounds are normal  She exhibits no distension  Musculoskeletal: Normal range of motion  She exhibits no edema  Neurological: She is alert and oriented to person, place, and time  She has a normal Finger-Nose-Finger Test and a normal Heel to [employer]    Skin: Skin is warm and dry  Psychiatric: She has a normal mood and affect  Her speech is normal and behavior is normal  Judgment and thought content normal    Vitals reviewed  Neurologic Exam     Mental Status   Oriented to person, place, and time  Oriented to [facility])  Oriented to year, month, date and day  Registration: recalls 3 of 3 objects  Recall at 5 minutes: recalls 3 of 3 objects  Follows 2 step commands  Attention: normal  Concentration: normal    Speech: speech is normal (Voice is hoarse)  Knowledge: good  Able to perform simple calculations  Able to name object  Able to read  Able to repeat  Normal comprehension  Cranial Nerves   Cranial nerves II through XII intact  CN III, IV, VI   Pupils are equal, round, and reactive to light  Extraocular motions are normal    Right pupil: Size: 3 mm  Shape: regular  Reactivity: brisk  Left pupil: Size: 3 mm  Shape: regular  Reactivity: brisk  Nystagmus: none     Motor Exam   Muscle bulk: normal  Overall muscle tone: normal  Right arm pronator drift: absent  Left arm pronator drift: absent    Strength   Strength 5/5 except as noted     Right wrist flexion: 3/5  Right wrist extension: 4/5  Right interossei: 3/5  Right abdominals: 4/5  Right iliopsoas: 4/5  Right quadriceps: 4/5  Right hamstrin/5    Sensory Exam   Light touch normal    Vibration normal    Right arm pinprick: decreased from wrist (feels dull from wrist to finger tips)  Left arm pinprick: decreased from elbow (feels dull on medial aspect of arm from elbow to wrist, then entire hand feels dull)  Right leg pinprick: normal  Left leg pinprick: normal    Gait, Coordination, and Reflexes     Coordination   Finger to nose coordination: normal  Heel to shin coordination: normal    Tremor   Resting tremor: absent    Reflexes   Right plantar: equivocal  Left plantar: normal  Right Estes: absent  Left Esets: absent  Right ankle clonus: absent  Left ankle clonus: absent      Lab Results:   I have personally reviewed pertinent reports  CBC:   Results from last 7 days  Lab Units 18  0520 18  1820 18  1620   WBC Thousand/uL 5 45  --  7 56   RBC Million/uL 3 88  --  4 04   HEMOGLOBIN g/dL 12 0  --  12 6   HEMATOCRIT % 35 3  --  36 8   MCV fL 91  --  91   PLATELETS Thousands/uL 264 257 270     BMP/CMP:   Results from last 7 days  Lab Units 18  0520 18  1620   SODIUM mmol/L 144 137   POTASSIUM mmol/L 3 9 4 2   CHLORIDE mmol/L 108 101   CO2 mmol/L 26 28   ANION GAP mmol/L 10 8   BUN mg/dL 7 8   CREATININE mg/dL 0 63 0 76   GLUCOSE RANDOM mg/dL 92 88   CALCIUM mg/dL 8 4 9 3   EGFR ml/min/1 73sq m 96 84     TSH:   Results from last 7 days  Lab Units 18  0520   TSH 3RD GENERATON uIU/mL 1 778     Coagulation:   Results from last 7 days  Lab Units 18  1620   INR  0 86     Lipid Profile:   Results from last 7 days  Lab Units 18  0520   HDL mg/dL 53   CHOLESTEROL mg/dL 185   LDL CALC mg/dL 116*   TRIGLYCERIDES mg/dL 80       Imaging Studies: I have personally reviewed pertinent reports  and I have personally reviewed pertinent films in PACS    18 CT Head wo contrast: FINDINGS:   PARENCHYMA:  No intracranial mass, mass effect or midline shift   No CT signs of acute infarction  No acute intracranial hemorrhage  [ There is mild periventricular white matter low attenuation which is nonspecific and most likely related to chronic small vessel ischemic changes  There are old bilateral basal ganglia lacunar infarcts    VENTRICLES AND EXTRA-AXIAL SPACES:  Normal for the patient's age    VISUALIZED ORBITS AND PARANASAL SINUSES:  Unremarkable    CALVARIUM AND EXTRACRANIAL SOFT TISSUES:  Normal    IMPRESSION:   No acute intracranial abnormality    Mild chronic small vessel ischemic changes  Old bilateral basal ganglia lacunar infarcts  EEG, Echo, Pathology, and Other Studies: I have personally reviewed pertinent reports     and I have personally reviewed pertinent films in PACS    VTE Prophylaxis: Sequential compression device (Venodyne)  and Enoxaparin (Lovenox)

## 2018-04-03 NOTE — PROGRESS NOTES
Progress Note - Neli Whalen 1955, 58 y o  female MRN: [id number]    Unit/Bed#: E4 -01 Encounter: [id number]    Primary Care Provider: Amanda Ignacio,    Date and time admitted to hospital: 4/2/2018  3:18 PM    ASSESSMENT AND PLAN     1   Right-sided weakness  · Repeat CT head from today no acute intracranial abnormalities  · Awaiting results of carotid ultrasounds, TTE  · Troponins x3 flat  · Unfortunately, due to presence of leads from neurostimulator placement, MRI brain cannot be performed  · Await official consult Consult Neurology  · ? Satruday night palsy --if CVA workup negative  · PTOT eval   2   Chronic back pain/ lumbar stenosis /status post neurostimulator placement  · No active issues  · Patient will continue on home pain medication regimen which she is very persistent on  · Unfortunately patient had a new stimulator placement done, and MRI cannot be conducted  · Patient does state that the neurostimulator was removed, but there is still presence of leads  3   Essential hypertension  · Blood pressure is reasonable  Continue with current antihypertensives  4   Generalized anxiety disorder/depression    · No active issues  Continue Valium, Wellbutrin, Prozac  5  Hypothyroidism    ·  TSH within normal limits  · Continue with levothyroxine  6   History of celiac disease    · No active issue  7   Tobacco use:   ·  Patient continues to smoke cigarettes  · Counseled smoking cessation  · Patient continues to tolerated a nicotine patch    _____________________________________________________________________    SUBJECTIVE:   Patient seen and examined  She appears comfortable  She states the weakness and right lower extremity improved but weakness in right hand persisted  Repeat CT head from morning does not reveal any acute intracranial abnormalities    Awaiting results of carotid ultrasounds, TTE     OBJECTIVE:     Vitals:   HR:  [54-74] 57  Resp: [16-18] 18  BP: (120-176)/(61-87) 139/64  SpO2:  [91 %-99 %] 99 %  Temp (24hrs), Av 5 °F (36 4 °C), Min:96 °F (35 6 °C), Max:98 3 °F (36 8 °C)  Current: Temperature: (!) 97 2 °F (36 2 °C)    Intake/Output Summary (Last 24 hours) at 18 1403  Last data filed at 18 0925   Gross per 24 hour   Intake          1316 25 ml   Output                0 ml   Net          1316 25 ml       Physical Exam:   GENERAL: AAO x 3  HEENT: atraumatic, normocephalic  Oral mucosa moist, no icterus, pallor  PERRLA +  Neck supple, no JVD, no lymphadenopathy, no thryomegaly  CHEST: B/L breath sounds heard, occasional wheezing  CVS: S1, S2  No cyanosis/clubbing or edema  ABDOMEN: Soft/obese/NT/BS heard  NEUROLOGICAL: CN II -XI grossly intact  No focal motor or sensory deficits  No signs of meningeal irritation or cerebellar dysfunction  EXTREMITIES: No cyanosis/clubbing or edema      LABS:  Results for Indra Whitney (MRN [id number]) as of 4/3/2018 14:04   4/3/2018 05:20   eGFR 96   Sodium 144   Potassium 3 9   Chloride 108   CO2 26   Anion Gap 10   BUN 7   Creatinine 0 63   Glucose 92   Calcium 8 4   Phosphorus 3 5   Magnesium 1 9   Cholesterol 185   Triglycerides 80   HDL 53   LDL Calculated 116 (H)   WBC 5 45   RBC 3 88   Hemoglobin 12 0   Hematocrit 35 3   MCV 91   MCHC 34 0   RDW 15 6 (H)   Platelets 594   MPV 60 3   Hemoglobin A1C 5 4      TSH 3RD GENERATON 1 778     Scheduled Meds:    Current Facility-Administered Medications:  acetaminophen 650 mg Oral Q8H [facility] Rijesh R Fredy Bernheim, MD    amLODIPine 2 5 mg Oral Daily Abelardo Sue MD    aspirin 81 mg Oral Daily Rijesh R Fredy Bernheim, MD    atorvastatin 40 mg Oral QPM Jhony Herring MD    buPROPion 150 mg Oral Daily Abelardo Sue MD    diazepam 5 mg Oral BID Jhony Herring MD    dicyclomine 20 mg Oral TID Jhony Herring MD    docusate sodium 100 mg Oral BID Abelardo Sue MD    enoxaparin 40 mg Subcutaneous Daily Jhony Herring MD fentaNYL 1 patch Transdermal Q72H Majo Otero MD    FLUoxetine 10 mg Oral Daily Abelardo Tse MD    gabapentin 600 mg Oral TID Majo Otero MD    levothyroxine 25 mcg Oral Early Morning Majo Otero MD    lisinopril 10 mg Oral Daily Majo Otero MD    methocarbamol 500 mg Oral Q8H PRN Majo Otero MD    metoprolol succinate 25 mg Oral Daily Majo Otero MD    nicotine 1 patch Transdermal Daily Majo Otero MD    ondansetron 4 mg Intravenous Q6H PRN Majo Otero MD    oxybutynin 10 mg Oral HS Vivienne Mccurdy PA-C    oxybutynin 5 mg Oral Daily Vivienne Mccurdy PA-C    oxyCODONE-acetaminophen 1 tablet Oral Q6H PRN Majo Otero MD    pantoprazole 40 mg Oral Daily Before Breakfast Majo Otero MD    polyethylene glycol 17 g Oral Daily Majo Otero MD    senna 1 tablet Oral Daily Abelardo Tse MD    sodium chloride 75 mL/hr Intravenous Continuous Majo Otero MD Last Rate: 75 mL/hr (04/03/18 0609)   sucralfate 1,000 mg Oral Q6H [address] Abelardo Mon MD        Continuous Infusions:    sodium chloride 75 mL/hr Last Rate: 75 mL/hr (04/03/18 0609)       PRN Meds:  methocarbamol    ondansetron    oxyCODONE-acetaminophen      VTE Prophylaxis:  Enoxaparin subcutaneously  Patient Centered Rounds: I have discussed today's plans with nursing staff today  DISPOSITION:  Pending further workup  Anticipate hospitalization for the next 24-36 hours  Time Spent for Care: 20 minutes   More than 50% of total time spent on counseling and coordination of care as described above  Family will be updated  Paula Rsoen MD  HOSPITALIST SERVICES  4/3/2018    PLEASE NOTE:  This encounter was completed utilizing the [employer] Direct Speech Voice Recognition Software   Grammatical errors, random word insertions, pronoun errors and incomplete sentences are occasional consequences of the system due to software limitations, ambient noise and hardware issues  These may be missed by proof reading prior to affixing electronic signature  Any questions or concerns about the content, text or information contained within the body of this dictation should be directly addressed to the physician for clarification  Please do not hesitate to call me directly if you have any any questions or concerns

## 2018-04-03 NOTE — CASE MANAGEMENT
Initial Clinical Review    Admission: Date/Time/Statement: 4/2/18 @ 1656 Inpatient Written     Orders Placed This Encounter   Procedures    Inpatient Admission (expected length of stay for this patient is greater than two midnights)     Standing Status:   Standing     Number of Occurrences:   1     Order Specific Question:   Admitting Physician     Answer:   NIKHIL WEEKS [[id number]]     Order Specific Question:   Level of Care     Answer:   Med Surg [[id number]]     Order Specific Question:   Estimated length of stay     Answer:   More than 2 Midnights     Order Specific Question:   Certification     Answer:   I certify that inpatient services are medically necessary for this patient for a duration of greater than two midnights  See H&P and MD Progress Notes for additional information about the patient's course of treatment  ED: Date/Time/Mode of Arrival:   ED Arrival Information     Expected Arrival Acuity Means of Arrival Escorted By Service Admission Type    - 4/2/2018 15:15 Emergent Walk-In Self General Medicine Emergency    Arrival Complaint    Stroke Like Sypmtoms          Chief Complaint:   Chief Complaint   Patient presents with    Extremity Weakness     Right sided weakness with limited hand movement and dextarity on right hand  Patient reports symptoms started on friday, denies headache, changes in vision  History of Illness: 49-year-old female presents for evaluation of right-sided weakness  The patient states that she woke Friday morning with right arm weakness that has been constant and improving since Friday    The patient has had some associated right leg weakness which she was not fully aware of due to the fact that she has had recurrent falls in the past      ED Vital Signs:   ED Triage Vitals   Temperature Pulse Respirations Blood Pressure SpO2   04/02/18 1522 04/02/18 1522 04/02/18 1522 04/02/18 1526 04/02/18 1522   97 9 °F (36 6 °C) 66 16 159/73 95 %      Temp Source Heart Rate Source Patient Position - Orthostatic VS BP Location FiO2 (%)   04/02/18 1522 04/02/18 1522 04/02/18 1522 04/02/18 1522 --   Oral Monitor Lying Left arm       Pain Score       04/02/18 1522       No Pain        Wt Readings from Last 1 Encounters:   04/03/18 70 3 kg (154 lb 15 7 oz)       Vital Signs (abnormal): temp 96, HR 54, 58 /79    Abnormal Labs/Diagnostic Test Results:   Protime 11 7       CT Head:  No acute intracranial abnormality  Mild chronic small vessel ischemic changes  Old bilateral basal ganglia lacunar infarcts  EKG:  Sinus rhythm with occasional Premature ventricular complexes    ED Treatment:   Medication Administration from 04/02/2018 1515 to 04/02/2018 1757       Date/Time Order Dose Route Action     04/02/2018 1619 aspirin chewable tablet 324 mg 324 mg Oral Given     04/02/2018 1619 famotidine (PEPCID) tablet 20 mg 20 mg Oral Given          Past Medical/Surgical History:    Active Ambulatory Problems     Diagnosis Date Noted    Chest pain 11/13/2015    Essential hypertension 08/03/2016    Abdominal bloating 08/19/2015    Benign essential HTN 05/21/2014    Celiac disease 05/23/2012    Neck pain 05/23/2012    Chronic pain disorder 02/26/2016    Common bile duct dilation 01/13/2015    Compression fracture of L1 lumbar vertebra (HCC) 10/09/2015    Depression 07/02/2012    Fibromyalgia 08/01/2012    Generalized osteoarthritis 12/03/2014    GERD (gastroesophageal reflux disease) 08/19/2015    Lumbar stenosis without neurogenic claudication 04/19/2013    Osteoarthritis of knee 05/23/2012    Osteoporosis 05/01/2014    Neuropathy 05/23/2012    Postlaminectomy syndrome of lumbar region 02/26/2016    Restless legs syndrome 05/23/2012    Disease of thyroid gland 07/21/2016    Urinary incontinence 07/31/2017    Anxiety 04/02/2018    Chronic bilateral low back pain without sciatica 08/09/2017    Disorder of bone and cartilage 01/15/2008    OAB (overactive bladder) 08/09/2017    Right sided weakness 04/02/2018     Resolved Ambulatory Problems     Diagnosis Date Noted    No Resolved Ambulatory Problems     Past Medical History:   Diagnosis Date    Arthritis     Celiac disease     Chronic narcotic dependence ([address] )     Chronic pain disorder     Depression     Disease of thyroid gland     Elevated liver enzymes     Fibromyalgia     GERD (gastroesophageal reflux disease)     Hypertension     Nicotine dependence     Osteopenia     Right sided weakness     RLS (restless legs syndrome)     Sepsis (HCC)        Admitting Diagnosis: Hypertension [I10]  Stroke (cerebrum) ([facility]) [I63 9]  Lower extremity weakness [R29 898]  Right sided weakness [R53 1]    Age/Sex: 58 y o  female    Assessment/Plan:   ASSESSMENT AND PLAN     1   Right-sided weakness:  Admit to telemetry for further workup  Unfortunately, due to presence of leads from neurostimulator placement, MRI brain cannot be performed  Repeat CT head in AM  Carotid  ultrasound, 2D echocardiogram, troponins q 3h x3, TSH, fasting lipid profile  Consult Neurology  ? Satruday night palsy --if CVA workup negative  PTOT eval   2   Chronic back pain/ lumbar stenosis /status post neurostimulator placement:  No active issues  Patient will continue on home pain medication regimen which she is very persistent on  Unfortunately patient had a new stimulator placement done, and MRI cannot be conducted  Patient does state that the neurostimulator was removed, but there is still presence of leads  3   Essential hypertension:  Blood pressure is reasonable  Continue with current antihypertensives  4   Generalized anxiety disorder/depression:  No active issues  Continue Valium, Wellbutrin, Prozac  5  Hypothyroidism:  Check TSH  Continue with levothyroxine  6   History of celiac disease:  No active issue  7   Tobacco use:  Patient continues to smoke cigarettes  Counseled smoking cessation    Nicotine patch does not work for her, makes her "sergio", hence  she refused       VTE PROPHYLAXIS: Enoxaparin (Lovenox)  / sequential compression device       Anticipated Length of Stay:  Patient will be admitted on an Inpatient basis with an anticipated length of stay of  more 2 midnights  Justification for Hospital Stay: CVA workup    Admission Orders:  Telemetry  NPO  OOB as josé  Incentive spirometry  Stroke education  Baseline NIH stroke scale  Daily weights  Neuro checks and VS qh  Dysphagia assessment prior to starting diet  Repeat CT head  VAS carotids  Echo  PT, OT, Speech  Consult neurology, nutr ser, cm  Sequential compression device  Trop q3h    Scheduled Meds:   Current Facility-Administered Medications:  acetaminophen 650 mg Oral Q8H [address]   amLODIPine 2 5 mg Oral Daily   aspirin 81 mg Oral Daily   atorvastatin 40 mg Oral QPM   buPROPion 150 mg Oral Daily   diazepam 5 mg Oral BID   dicyclomine 20 mg Oral TID   docusate sodium 100 mg Oral BID   enoxaparin 40 mg Subcutaneous Daily   fentaNYL 1 patch Transdermal Q72H   FLUoxetine 10 mg Oral Daily   gabapentin 600 mg Oral TID   levothyroxine 25 mcg Oral Early Morning   lisinopril 10 mg Oral Daily   methocarbamol 500 mg Oral Q8H PRN   metoprolol succinate 25 mg Oral Daily   nicotine 1 patch Transdermal Daily   ondansetron 4 mg Intravenous Q6H PRN   oxybutynin 10 mg Oral HS   oxybutynin 5 mg Oral Daily   oxyCODONE-acetaminophen 1 tablet Oral Q6H PRN   pantoprazole 40 mg Oral Daily Before Breakfast   polyethylene glycol 17 g Oral Daily   senna 1 tablet Oral Daily   sodium chloride 75 mL/hr Intravenous Continuous   sucralfate 1,000 mg Oral Q6H [address]     Continuous Infusions:   sodium chloride 75 mL/hr Last Rate: 75 mL/hr (04/03/18 0609)     PRN Meds: methocarbamol    ondansetron    oxyCODONE-acetaminophen    Thank you,  [address] in the [facility] by Dilan Bailey for 2017  Network Utilization Review Department  Phone: [phone];  Fax [phone]  ATTENTION: The Network Utilization Review Department is now centralized for our 7 Facilities  Make a note that we have a new phone and fax numbers for our Department  Please call with any questions or concerns to [phone] and carefully follow the prompts so that you are directed to the right person  All voicemails are confidential  Fax any determinations, approvals, denials, and requests for initial or continue stay review clinical to [phone]  Due to HIGH CALL volume, it would be easier if you could please send faxed requests to expedite your requests and in part, help us provide discharge notifications faster

## 2018-04-03 NOTE — CONSULTS
Pt eating well using room service to order meals, has no difficulty chewing swallowing, no acute nutrtional issues noted at present, pt states appetite is at baseline  Thank you for consult will continue to follow with you

## 2018-04-03 NOTE — SPEECH THERAPY NOTE
Speech Language/Pathology  Pt screened  No ST needs identifies at this time  Speech clear and fluent  Tolerating diet  D/c ST

## 2018-04-03 NOTE — OCCUPATIONAL THERAPY NOTE
OccupationalTherapy Evaluation(time=4408-7542)     Patient Name: Braxton Shelley  IJNLY'U Date: 4/3/2018  Problem List  Patient Active Problem List   Diagnosis    Chest pain    Essential hypertension    Abdominal bloating    Benign essential HTN    Celiac disease    Neck pain    Chronic pain disorder    Common bile duct dilation    Compression fracture of L1 lumbar vertebra (HCC)    Depression    Fibromyalgia    Generalized osteoarthritis    GERD (gastroesophageal reflux disease)    Lumbar stenosis without neurogenic claudication    Osteoarthritis of knee    Osteoporosis    Neuropathy    Postlaminectomy syndrome of lumbar region    Restless legs syndrome    Disease of thyroid gland    Urinary incontinence    Anxiety    Chronic bilateral low back pain without sciatica    Disorder of bone and cartilage    OAB (overactive bladder)    Right sided weakness    Tobacco use     Past Medical History  Past Medical History:   Diagnosis Date    Arthritis     Celiac disease     Chronic narcotic dependence ([address] )     Chronic pain disorder     Depression     Disease of thyroid gland     Elevated liver enzymes     history of    Fibromyalgia     GERD (gastroesophageal reflux disease)     Hypertension     Nicotine dependence     Osteopenia     Right sided weakness     RLS (restless legs syndrome)     Sepsis ([address] )     dec 2016     Past Surgical History  Past Surgical History:   Procedure Laterality Date    APPENDECTOMY      BACK SURGERY       SECTION      HYSTERECTOMY      KNEE ARTHROSCOPY Bilateral     WI REVISE/REMOVE SPINAL NEUROSTIM/ Left 2017    Procedure: REMOVAL OF A LEFT BUTTOCK SCS IPG;  Surgeon: Mando Brooks MD;  Location:  MAIN OR;  Service: Neurosurgery    WI SURG IMPLNT [address] N/A 8/3/2016    Procedure: PLACEMENT THORACIC SPINAL CORD STIMULATOR WITH LEFT BUTTOCK IMPLANTABLE PULSE GENERATOR;  Surgeon: Mando Brooks MD; Location: [facility];  Service: Neurosurgery    JIGAR-EN-Y PROCEDURE      TUBAL LIGATION        04/03/18 0925   Note Type   Note type Eval only   Restrictions/Precautions   Other Precautions Fall Risk;Pain   Pain Assessment   Pain Assessment 0-10   Pain Score 8   Pain Type Chronic pain   Pain Location Back   Home Living   Type of [address] One level  (2 dorian with railing)   Prior Function   Lives With Spouse   ADL Assistance Independent   Falls in the last 6 months 5 to 10   Lifestyle   Autonomy PTA pt states independence with all aspects of her ADLs, transfers, ambulation--ocassional use of SPC; +falls=10, ocassionally home alone, +; sleeps in recliner   Reciprocal Relationships 1 son   Service to Others worked for [employer], [employer] supervision, [address] [facility] working on the [employer]   Psychosocial   Psychosocial (WDL) WDL   Subjective   Subjective "I don't know why I keep falling at home "   ADL   Where Assessed Edge of bed   Eating Assistance 6  Modified independent   Grooming Assistance 6  Modified Independent   UB Bathing Assistance 5  Supervision/Setup   LB Bathing Assistance 4  Minimal Assistance   [address] 5  Supervision/Setup    [address] 4  Minimal Assistance   Bed Mobility   Rolling R 5  Supervision   Rolling L 5  Supervision   Supine to Sit 5  Supervision   Transfers   Sit to Stand 3  Moderate assistance   Additional items Increased time required;Verbal cues; Assist x 2   Stand to Sit 5  Supervision   Additional items Increased time required   Functional Mobility   Functional Mobility 4  Minimal assistance   Additional Comments x1   Additional items Rolling walker   Balance   Static Sitting Good   Dynamic Sitting Fair +   Static Standing Fair   Dynamic Standing Fair -   Activity Tolerance   Activity Tolerance Patient limited by fatigue;Patient limited by pain   Medical Staff Made Aware nsg   RUE Assessment   RUE Assessment X  (shr,elbow=WFLs, limited wrist extension, fingers)   RUE Strength   RUE Overall Strength (shr=4-/5, elbow=3+/5, hand/wrist=3-/5)   LUE Assessment   LUE Assessment WFL   LUE Strength   LUE Overall Strength (4+/5 throughout)   Hand Function   Gross Motor Coordination Functional   Fine Motor Coordination (R=impaired, L=intact)   Sensation   Light Touch No apparent deficits   Proprioception   Proprioception No apparent deficits   Vision-Basic Assessment   Current Vision Wears contacts   Vision - Complex Assessment   Acuity Able to read clock/calendar on wall without difficulty   Perception   Inattention/Neglect Appears intact   Cognition   Overall Cognitive Status WFL   Arousal/Participation Alert   Attention Within functional limits   Orientation Level Oriented X4   Memory Within functional limits  (3/3 recall after 5mins)   Following Commands Follows all commands and directions without difficulty   Assessment   Limitation Decreased ADL status; Decreased UE ROM; Decreased UE strength;Decreased Safe judgement during ADL;Decreased cognition;Decreased endurance;Decreased fine motor control   Prognosis Good   Assessment Pt is a 63y/o female admitted to the hospital from her PCP 2* persistent R UE weakness(initially started Friday); CT(brain)=neg acute findings  PTA pt states independence with all aspects of her ADLs, transfers, ambulation--ocassional use of SPC; +falls=10, ocassionally home alone, +; sleeps in recliner  During initial eval, pt demonstrated deficits with her functional balance, functional mobility, ADL status, activity tolerance(currently fair=15-20mins), R UE strength, and transfer safety  Pt would benefit from continued OT tx for the above deficits  3-5xwk/1-2wks   Goals   Patient Goals "to be able to go home "   STG Time Frame 3-5   Short Term Goal #1 Pt will demonstrate improved activity tolerance to good(20-30mins) and standing tolerance to 3-5mins to assist with ADLs     Short Term Goal #2 Pt will demonstrate mod I with their sit-stand transfers to assist with completion of their LE dressing  Short Term Goal  Pt will demonstrate independence with her R UE % of the time  LTG Time Frame (5-10days)   Long Term Goal #1 Pt will demonstrate proper walker/transfer safety 100% of the time  Long Term Goal #2 Pt will independently verbalize 2-3 potential fall risks/transfer safety hazards and their appropriate compensation techniques  Long Term Goal Pt will demonstrate mod I with their UE and LE bathing/dresssing  Plan   Treatment Interventions ADL retraining;Functional transfer training;UE strengthening/ROM; Endurance training;Patient/family training;Equipment evaluation/education; Compensatory technique education   Goal Expiration Date 04/13/18   Treatment Day 0   OT Frequency 3-5x/wk   Recommendation   OT Discharge Recommendation Short Term Rehab  (pt prefers home with home therapy)   Barthel Index   Feeding 10   Bathing 0   Grooming Score 5   Dressing Score 5   Bladder Score 10   Bowels Score 10   Toilet Use Score 10   Transfers (Bed/Chair) Score 10   Mobility (Level Surface) Score 0   Stairs Score 0   Barthel Index Score 60   Modified Albion Scale   Modified Albion Scale 4   Greg Valle, OT

## 2018-04-03 NOTE — PLAN OF CARE
Problem: OCCUPATIONAL THERAPY ADULT  Goal: Performs self-care activities at highest level of function for planned discharge setting  See evaluation for individualized goals  Treatment Interventions: ADL retraining, Functional transfer training, UE strengthening/ROM, Endurance training, Patient/family training, Equipment evaluation/education, Compensatory technique education          See flowsheet documentation for full assessment, interventions and recommendations  Limitation: Decreased ADL status, Decreased UE ROM, Decreased UE strength, Decreased Safe judgement during ADL, Decreased cognition, Decreased endurance, Decreased fine motor control  Prognosis: Good  Assessment: Pt is a 61y/o female admitted to the hospital from her PCP 2* persistent R UE weakness(initially started Friday); CT(brain)=neg acute findings  PTA pt states independence with all aspects of her ADLs, transfers, ambulation--ocassional use of SPC; +falls=10, ocassionally home alone, +; sleeps in recliner  During initial eval, pt demonstrated deficits with her functional balance, functional mobility, ADL status, activity tolerance(currently fair=15-20mins), R UE strength, and transfer safety  Pt would benefit from continued OT tx for the above deficits  3-5xwk/1-2wks     OT Discharge Recommendation: Short Term Rehab (pt prefers home with home therapy)

## 2018-04-03 NOTE — ASSESSMENT & PLAN NOTE
· Weakness of right hand persists with slight improvement in right lower extremity weakness  · Repeat CT head from today morning:  No acute intracranial abnormalities  · Awaiting results of ultrasound carotids, TTE   · Unfortunately, due to presence of leads from neurostimulator placement, MRI brain cannot be performed   '  · Troponins x3 flat  · Neurology consult awaited

## 2018-04-03 NOTE — PLAN OF CARE
DISCHARGE PLANNING     Discharge to home or other facility with appropriate resources Progressing        INFECTION - ADULT     Absence or prevention of progression during hospitalization Progressing     Absence of fever/infection during neutropenic period Progressing        Knowledge Deficit     Patient/family/caregiver demonstrates understanding of disease process, treatment plan, medications, and discharge instructions Progressing        METABOLIC, FLUID AND ELECTROLYTES - ADULT     Electrolytes maintained within normal limits Progressing     Fluid balance maintained Progressing     Glucose maintained within target range Progressing        MUSCULOSKELETAL - ADULT     Maintain or return mobility to safest level of function Progressing     Maintain proper alignment of affected body part Progressing        NEUROSENSORY - ADULT     Achieves stable or improved neurological status Progressing     Absence of seizures Progressing     Remains free of injury related to seizures activity Progressing     Achieves maximal functionality and self care Progressing        Nutrition/Hydration-ADULT     Nutrient/Hydration intake appropriate for improving, restoring or maintaining nutritional needs Progressing        PAIN - ADULT     Verbalizes/displays adequate comfort level or baseline comfort level Progressing        Potential for Falls     Patient will remain free of falls Progressing        SAFETY ADULT     Maintain or return to baseline ADL function Progressing     Maintain or return mobility status to optimal level Progressing        SKIN/TISSUE INTEGRITY - ADULT     Skin integrity remains intact Progressing     Incision(s), wounds(s) or drain site(s) healing without S/S of infection Progressing     Oral mucous membranes remain intact Progressing

## 2018-04-03 NOTE — PLAN OF CARE
Problem: PHYSICAL THERAPY ADULT  Goal: Performs mobility at highest level of function for planned discharge setting  See evaluation for individualized goals  Treatment/Interventions: Functional transfer training, LE strengthening/ROM, Elevations, Therapeutic exercise, Endurance training, Patient/family training, Bed mobility, Equipment eval/education, Gait training, Compensatory technique education, Continued evaluation, Spoke to nursing, OT  Equipment Recommended:  (? benefit from trial of rollator)       See flowsheet documentation for full assessment, interventions and recommendations  Prognosis: Fair  Problem List: Decreased strength, Decreased endurance, Impaired balance, Decreased mobility, Decreased safety awareness, Pain, Orthopedic restrictions  Assessment: Pt is 59 y/o female admitted wtih R sided weakness  CT negative for new infact, but old BG infarct noted  Baseline reported as independent with hx of chronic back pain  Notes hx of 10 falls over 6 month timeframe  Intermittent use of AD in home  Presents with decreased RUE strength  Increasd LE pain with mild decrease in strength  S for bed mobilty  MOdA of 2 sit>stand needed with increased time to acheive full upright  Increasd time to steady needed  Able to progress and ambulate short distance with RW support  Would continue to recommend use of RW  Pt expresses displeasure with same stating great inconvenience in home  Will benefit from continued IPPT in order to optimize functional outcomes and reduce risk of fall  Amendable only to HHPT  Barriers to Discharge Comments: fall risk  Recommendation: Home PT, Home with family support (amendable only to hhpt)          See flowsheet documentation for full assessment

## 2018-04-03 NOTE — PHYSICAL THERAPY NOTE
PT EVALUATION    58 y o     [id number]    Hypertension [I10]  Stroke (cerebrum) (HCC) [I63 9]  Lower extremity weakness [R29 898]  Right sided weakness [R53 1]    Past Medical History:   Diagnosis Date    Arthritis     Celiac disease     Chronic narcotic dependence ([facility] [address] )     Chronic pain disorder     Depression     Disease of thyroid gland     Elevated liver enzymes     history of    Fibromyalgia     GERD (gastroesophageal reflux disease)     Hypertension     Nicotine dependence     Osteopenia     Right sided weakness     RLS (restless legs syndrome)     Sepsis ([facility] [address] )     dec 2016         Past Surgical History:   Procedure Laterality Date    APPENDECTOMY      BACK SURGERY       SECTION      HYSTERECTOMY      KNEE ARTHROSCOPY Bilateral     MN REVISE/REMOVE SPINAL NEUROSTIM/ Left 2017    Procedure: REMOVAL OF A LEFT BUTTOCK SCS IPG;  Surgeon: Stan Sommer MD;  Location: [facility];  Service: Neurosurgery    MN SURG IMPLNT [address] N/A 8/3/2016    Procedure: PLACEMENT THORACIC SPINAL CORD STIMULATOR WITH LEFT BUTTOCK IMPLANTABLE PULSE GENERATOR;  Surgeon: Stan Sommer MD;  [facility]: [facility];  Service: Neurosurgery    JIGAR-EN-Y PROCEDURE      TUBAL LIGATION        18 0926   Note Type   Note type Eval only   Pain Assessment   Pain Score 9   Pain Location Back; [facility]   Hospital Pain Intervention(s) Repositioned; Ambulation/increased activity; Emotional support   Response to Interventions satisfied   Home Living   Type of [address] One level  (1+1 OANH)   Bathroom Equipment Grab bars around toilet   Home Equipment Cane;Walker   Additional Comments I with occasional use of cane  Uses RW when very bad days but does not prefer     Prior Function   Level of Love Independent with ADLs and functional mobility   Lives With Spouse   Receives Help From Family   ADL Assistance Independent   Falls in the last 6 months >10   Comments Reports I without use of AD  Multiple falls  Restrictions/Precautions   Weight Bearing Precautions Per Order No   Other Precautions Fall Risk;Telemetry;Multiple lines;Pain   General   Additional Pertinent History Pt is 57 y/o female admitted with R sided weakness  R/O CVA  PT consulted  Hx of chronic back pain  Hx of multiple falls  Family/Caregiver Present No   Cognition   Overall Cognitive Status WFL   RUE Assessment   RUE Assessment X  (distal limitations noted, see OT eval for full details )   LUE Assessment   LUE Assessment WFL   RLE Assessment   RLE Assessment WFL  (groslsy 4-/5)   LLE Assessment   LLE Assessment WFL  (grossly 4-/5, + pain)   Coordination   Movements are Fluid and Coordinated 1   Light Touch   RLE Light Touch Grossly intact   LLE Light Touch Grossly intact   Proprioception   RLE Proprioception Grossly intact   LLE Proprioception Grossly Intact   Bed Mobility   Supine to Sit 5  Supervision   Additional items HOB elevated; Bedrails; Increased time required   Transfers   Sit to Stand 3  Moderate assistance   Additional items Assist x 2; Increased time required   Stand to Sit 4  Minimal assistance   Additional items Assist x 1   Additional Comments Increased time to complete transition sit to stand  Ambulation/Elevation   Gait pattern Decreased foot clearance; Improper Weight shift;Narrow DONALD; Inconsistent gary; Short stride   Gait Assistance 4  Minimal assist   Additional items Assist x 1   Assistive Device Rolling walker   Distance Amb with RW 30'x1  Balance   Static Sitting Good   Dynamic Sitting Fair +   Static Standing Fair   Dynamic Standing Poor +   Ambulatory Poor +   Endurance Deficit   Endurance Deficit Yes   Endurance Deficit Description pain   Activity Tolerance   Activity Tolerance Patient limited by fatigue;Patient limited by pain   Medical Staff Made Aware Izzy Armstrong   Nurse Made Aware yes, updated  At bedside     Assessment   Prognosis Fair   Problem List Decreased strength;Decreased endurance; Impaired balance;Decreased mobility; Decreased safety awareness;Pain;Orthopedic restrictions   Assessment Pt is 59 y/o female admitted wtih R sided weakness  CT negative for new infact, but old BG infarct noted  Baseline reported as independent with hx of chronic back pain  Notes hx of 10 falls over 6 month timeframe  Intermittent use of AD in home  Presents with decreased RUE strength  Increasd LE pain with mild decrease in strength  S for bed mobilty  MOdA of 2 sit>stand needed with increased time to acheive full upright  Increasd time to steady needed  Able to progress and ambulate short distance with RW support  Would continue to recommend use of RW  Pt expresses displeasure with same stating great inconvenience in home  Will benefit from continued IPPT in order to optimize functional outcomes and reduce risk of fall  Amendable only to HHPT  Barriers to Discharge Comments fall risk   Goals   Patient Goals go home   STG Expiration Date 04/13/18   Short Term Goal #1 10 days:  Bed mobilty with Tian  Transfers with Tian  Amb with LRAD > 150'x1 with Tian  Improve overall strength and balance 1/2 grade  Negotiate step with Luis  Treatment Day 0   Plan   Treatment/Interventions Functional transfer training;LE strengthening/ROM; Elevations; Therapeutic exercise; Endurance training;Patient/family training;Bed mobility; Equipment eval/education;Gait training; Compensatory technique education;Continued evaluation;Spoke to nursing;OT   PT Frequency 5x/wk   Recommendation   Recommendation Home PT; Home with family support  (amendable only to hhpt)   Equipment Recommended (? benefit from trial of rollator)   Barthel Index   Feeding 10   Bathing 0   Grooming Score 5   Dressing Score 5   Bladder Score 10   Bowels Score 10   Toilet Use Score 10   Transfers (Bed/Chair) Score 10   Mobility (Level Surface) Score 0   Stairs Score 0   Barthel Index Score 60     History: co - morbidities, fall risk, use of assistive device, assist for adl's,chronic pain, multiple lines  Exam: impairments in locomotion, musculoskeletal, balance,posture, joint integrity, barthel 60   Clinical: unstable/unpredictable  Complexity:high      Otelia Prior, PT

## 2018-04-04 ENCOUNTER — APPOINTMENT (INPATIENT)
Dept: NON INVASIVE DIAGNOSTICS | Facility: HOSPITAL | Age: 63
DRG: 065 | End: 2018-04-04
Payer: COMMERCIAL

## 2018-04-04 VITALS
WEIGHT: 155.42 LBS | HEIGHT: 65 IN | SYSTOLIC BLOOD PRESSURE: 163 MMHG | TEMPERATURE: 96.1 F | RESPIRATION RATE: 18 BRPM | OXYGEN SATURATION: 98 % | BODY MASS INDEX: 25.9 KG/M2 | HEART RATE: 52 BPM | DIASTOLIC BLOOD PRESSURE: 83 MMHG

## 2018-04-04 LAB — GLUCOSE SERPL-MCNC: 40 MG/DL (ref 65–140)

## 2018-04-04 PROCEDURE — 93306 TTE W/DOPPLER COMPLETE: CPT | Performed by: INTERNAL MEDICINE

## 2018-04-04 PROCEDURE — 93306 TTE W/DOPPLER COMPLETE: CPT

## 2018-04-04 PROCEDURE — 99239 HOSP IP/OBS DSCHRG MGMT >30: CPT | Performed by: INTERNAL MEDICINE

## 2018-04-04 PROCEDURE — 82948 REAGENT STRIP/BLOOD GLUCOSE: CPT

## 2018-04-04 RX ORDER — ATORVASTATIN CALCIUM 80 MG/1
80 TABLET, FILM COATED ORAL EVERY EVENING
Qty: 30 TABLET | Refills: 0
Start: 2018-04-04 | End: 2018-04-26 | Stop reason: SDUPTHER

## 2018-04-04 RX ORDER — ASPIRIN 81 MG/1
81 TABLET, CHEWABLE ORAL DAILY
Qty: 30 TABLET | Refills: 0
Start: 2018-04-05 | End: 2020-06-23

## 2018-04-04 RX ORDER — GABAPENTIN 600 MG/1
600 TABLET ORAL 3 TIMES DAILY
Qty: 1 TABLET | Refills: 0
Start: 2018-04-04 | End: 2018-06-04 | Stop reason: SDUPTHER

## 2018-04-04 RX ADMIN — PANTOPRAZOLE SODIUM 40 MG: 40 TABLET, DELAYED RELEASE ORAL at 05:24

## 2018-04-04 RX ADMIN — SUCRALFATE 1000 MG: 1 SUSPENSION ORAL at 13:34

## 2018-04-04 RX ADMIN — BUPROPION HYDROCHLORIDE 150 MG: 150 TABLET, EXTENDED RELEASE ORAL at 08:50

## 2018-04-04 RX ADMIN — LISINOPRIL 10 MG: 10 TABLET ORAL at 08:49

## 2018-04-04 RX ADMIN — GABAPENTIN 600 MG: 300 CAPSULE ORAL at 16:09

## 2018-04-04 RX ADMIN — DICYCLOMINE HYDROCHLORIDE 20 MG: 20 TABLET ORAL at 08:48

## 2018-04-04 RX ADMIN — FLUOXETINE 10 MG: 10 CAPSULE ORAL at 08:48

## 2018-04-04 RX ADMIN — METOPROLOL SUCCINATE 25 MG: 25 TABLET, EXTENDED RELEASE ORAL at 08:49

## 2018-04-04 RX ADMIN — AMLODIPINE BESYLATE 2.5 MG: 2.5 TABLET ORAL at 08:49

## 2018-04-04 RX ADMIN — SENNOSIDES 8.6 MG: 8.6 TABLET, FILM COATED ORAL at 08:49

## 2018-04-04 RX ADMIN — DICYCLOMINE HYDROCHLORIDE 20 MG: 20 TABLET ORAL at 16:09

## 2018-04-04 RX ADMIN — GABAPENTIN 600 MG: 300 CAPSULE ORAL at 08:49

## 2018-04-04 RX ADMIN — OXYBUTYNIN CHLORIDE 5 MG: 5 TABLET ORAL at 08:48

## 2018-04-04 RX ADMIN — ASPIRIN 81 MG 81 MG: 81 TABLET ORAL at 08:48

## 2018-04-04 RX ADMIN — ACETAMINOPHEN 650 MG: 325 TABLET, FILM COATED ORAL at 05:24

## 2018-04-04 RX ADMIN — DOCUSATE SODIUM 100 MG: 100 CAPSULE, LIQUID FILLED ORAL at 08:48

## 2018-04-04 RX ADMIN — DIAZEPAM 5 MG: 5 TABLET ORAL at 08:49

## 2018-04-04 RX ADMIN — ACETAMINOPHEN 650 MG: 325 TABLET, FILM COATED ORAL at 13:34

## 2018-04-04 RX ADMIN — LEVOTHYROXINE SODIUM 25 MCG: 25 TABLET ORAL at 05:24

## 2018-04-04 RX ADMIN — POLYETHYLENE GLYCOL 3350 17 G: 17 POWDER, FOR SOLUTION ORAL at 08:48

## 2018-04-04 RX ADMIN — SUCRALFATE 1000 MG: 1 SUSPENSION ORAL at 05:24

## 2018-04-04 RX ADMIN — ONDANSETRON 4 MG: 2 INJECTION INTRAMUSCULAR; INTRAVENOUS at 13:40

## 2018-04-04 RX ADMIN — ENOXAPARIN SODIUM 40 MG: 40 INJECTION SUBCUTANEOUS at 08:48

## 2018-04-04 NOTE — DISCHARGE SUMMARY
[facility]    NAME: Braxton Shelley  AGE: 58 y o  SEX: female   MRN: [id number]   Encounter: [id number]   Unit/Bed: E4 -01     Admitting Provider:  Rich Collins MD  Discharge Provider:  Llewellyn Crigler, MD  Admission Date: 4/2/2018       Discharge Date: 04/04/18   LOS: 2  Primary Care Physician at Discharge: Nitin Naranjo [phone]    DISCHARGE DIAGNOSES  · LEFT MCA TERRITORY ACUTE/SUBACUTE INFARCT, LIKELY SMALL VESSEL THROMBOTIC FRONTAL INFARCT    SECONDARY DIAGNOSES  · CHRONIC BACK PAIN/LUMBAR STENOSIS/STATUS POST NEUROSTIMULATOR PLACEMENT  · ESSENTIAL HYPERTENSION  · GENERALIZED ANXIETY DISORDER  · DEPRESSION  · HYPOTHYROIDISM  · ONGOING TOBACCO USE  · HISTORY OF CELIAC DISEASE  · TIAs    HOSPITAL COURSE:  Braxton Shelley is a delightful 70-year-old lady with past medical history as below was sent in from PCPs office as she was found to have right-sided weakness  Patient states that she woke up on Friday with right hand numbness and weakness  She typically stated that she could not grasp, any objects with the right hand  Associated tingling numbness  She felt that this would go away hence did not seek any medical attention  It persisted all throughout the weekend -- patient went to PCP today  Patient was found to have right-sided weakness and to the ED  Patient states that the weakness and numbness of the right hand has improved  Patient denies chest pain, PND, orthopnea, nausea, vomiting, diarrhea, constipation, blood in urine, fevers, chills, abdominal pain,diarrhea, constipation, dysuria, facial droop, slurred speech, seizure-like activity, recent travel, dizziness, syncope, fall, trauma to the head    She also denies having similar episodes in the recent past   Patient's  does mention that patient always sleeps in a recliner usually on her right-hand side  Below is the short summary of hospital stay:    1   LEFT MCA TERRITORY ACUTE/SUBACUTE INFARCT, LIKELY SMALL VESSEL THROMBOTIC FRONTAL INFARCT  · Possible small-vessel stroke  · Repeat CT head from 04/03--> no acute intracranial abnormalities  · TTE: EF 96%, grade 1 diastolic dysfunction, left atrium mildly to moderately dilated  · Troponins x3 flat, carotid ultrasound unremarkable  · Unfortunately, due to presence of leads from neurostimulator placement, MRI brain cannot be performed       · Appreciate Neurology input  · Home visiting nursing  have been set up prior to discharge  · Continue with aspirin, statin, strict smoking cessation  2   Chronic back pain/ lumbar stenosis /status post neurostimulator placement  · No active issues     · Patient will continue on home pain medication regimen which she is very persistent on     · Unfortunately patient had a new stimulator placement done, and MRI cannot be conducted     · Patient does state that the neurostimulator was removed, but there is still presence of leads  · Dose of gabapentin has been reduced to 600 mg TID, as this may be contributing to her frequent falls  3   Essential hypertension  · Blood pressure is reasonable     · Continue with current antihypertensives  4   Generalized anxiety disorder/depression    · No active issues   Continue Valium, Wellbutrin, Prozac  5   Hypothyroidism    ·  TSH within normal limits     · Continue with levothyroxine  6   History of celiac disease    · No active issue  7   Tobacco use:   · Patient continues to smoke cigarettes     · Counseled smoking cessation     · Patient was offered nicotine patch upon discharge but she has politely refused      CONSULTING PROVIDERS   · Neurology    PROCEDURES PERFORMED  Cta Head And Neck W Wo Contrast    Result Date: 4/3/2018  Narrative: CTA NECK AND BRAIN WITH AND WITHOUT CONTRAST INDICATION: Right-sided weakness COMPARISON:   Same date CT TECHNIQUE:  Routine CT imaging of the Brain without contrast   Post contrast imaging was performed after administration of iodinated contrast through the neck and brain  Post contrast axial 0 625 mm images timed to opacify the arterial system  3D rendering was performed on an independent workstation  MIP reconstructions performed  Coronal reconstructions were performed of the noncontrast portion of the brain  Radiation dose length product (DLP) for this visit:  1343 24 mGy-cm   This examination, like all CT scans performed in the [facility], was performed utilizing techniques to minimize radiation dose exposure, including the use of iterative reconstruction and automated exposure control  IV Contrast:  85 mL of iohexol (OMNIPAQUE)  IMAGE QUALITY:   Diagnostic FINDINGS: NONCONTRAST BRAIN PARENCHYMA:  Decreased attenuation is noted in the supratentorial white matter demonstrating an appearance most consistent with mild microangiopathic change  No intracranial mass, mass effect or midline shift  No acute intracranial hemorrhage  Old right corona radiata infarct  Hypodensity in the left anterior limb internal capsule likely from old infarct  VENTRICLES AND EXTRA-AXIAL SPACES:  Normal for patient's age  VISUALIZED ORBITS AND PARANASAL SINUSES:  Unremarkable  CALVARIUM AND EXTRACRANIAL SOFT TISSUES:   Normal  CERVICAL VASCULATURE AORTIC ARCH AND GREAT VESSELS:  Mild athersclerotic disease of the arch, proximal great vessels and visualized subclavian vessels  No significant stenosis  RIGHT VERTEBRAL ARTERY CERVICAL SEGMENT:  Normal origin  The vessel is normal in caliber throughout the neck  LEFT VERTEBRAL ARTERY CERVICAL SEGMENT:  Normal origin  The vessel is normal in caliber throughout the neck   RIGHT EXTRACRANIAL CAROTID SEGMENT:  Mild atherosclerotic disease of the distal common carotid artery and proximal cervical internal carotid artery without significant stenosis compared to the more distal ICA  LEFT EXTRACRANIAL CAROTID SEGMENT:  Mild atherosclerotic disease of the distal common carotid artery and proximal cervical internal carotid artery without significant stenosis compared to the more distal ICA  NASCET criteria was used to determine the degree of internal carotid artery diameter stenosis  INTRACRANIAL VASCULATURE INTERNAL CAROTID ARTERIES:  Normal enhancement of the intracranial portions of the internal carotid arteries  Normal ophthalmic artery origins  Normal ICA terminus  ANTERIOR CIRCULATION:  Symmetric A1 segments and anterior cerebral arteries with normal enhancement  Normal anterior communicating artery  MIDDLE CEREBRAL ARTERY CIRCULATION:  M1 segment and middle cerebral artery branches demonstrate normal enhancement bilaterally  DISTAL VERTEBRAL ARTERIES:  Normal distal vertebral arteries  Posterior inferior cerebellar artery origins are normal  Normal vertebral basilar junction  BASILAR ARTERY:  Basilar artery is normal in caliber  Normal superior cerebellar arteries  POSTERIOR CEREBRAL ARTERIES: Both posterior cerebral arteries arises from the basilar tip  Both arteries demonstrate normal enhancement  Normal posterior communicating arteries  DURAL VENOUS SINUSES:  Normal  NON VASCULAR ANATOMY BONY STRUCTURES:  Reversal of the normal cervical lordosis with cervical spine degenerative change from C2 to C6  SOFT TISSUES OF THE NECK:  Unremarkable  THORACIC INLET:  Biapical lung emphysematous changes  Impression: 1  No evidence of hemodynamic stenosis  2   No evidence of acute intracranial hemorrhage  Workstation performed: [id number]     Ct Head Wo Contrast    Result Date: 4/3/2018  Narrative: CT BRAIN - WITHOUT CONTRAST INDICATION:   Follow-up CVA with history of right-sided weakness  COMPARISON:  4/2/2018 TECHNIQUE:  CT examination of the brain was performed  In addition to axial images, coronal 2D reformatted images were created and submitted for interpretation  Radiation dose length product (DLP) for this visit:  1186 mGy-cm   This examination, like all CT scans performed in the [facility], was performed utilizing techniques to minimize radiation dose exposure, including the use of iterative reconstruction and automated exposure control  IMAGE QUALITY:  Diagnostic  FINDINGS: PARENCHYMA: Decreased attenuation is noted in periventricular and subcortical white matter and bilateral internal capsules demonstrating an appearance that is statistically most likely to represent mild microangiopathic change  No CT signs of acute infarction  No intracranial mass, mass effect or midline shift  No acute parenchymal hemorrhage  VENTRICLES AND EXTRA-AXIAL SPACES:  Normal for the patient's age  VISUALIZED ORBITS AND PARANASAL SINUSES:  Unremarkable  CALVARIUM AND EXTRACRANIAL SOFT TISSUES:  Normal      Impression: No acute intracranial abnormality  Microangiopathic changes  Workstation performed: [id number]     Ct Head Without Contrast    Result Date: 4/2/2018  Narrative: CT BRAIN - WITHOUT CONTRAST INDICATION:   Right-sided weakness    COMPARISON:  CT brain dated March 22, 2009  TECHNIQUE:  CT examination of the brain was performed  In addition to axial images, coronal 2D reformatted images were created and submitted for interpretation  Radiation dose length product (DLP) for this visit:  1009 mGy-cm   This examination, like all CT scans performed in the [facility], was performed utilizing techniques to minimize radiation dose exposure, including the use of iterative reconstruction and automated exposure control  IMAGE QUALITY:  Diagnostic  FINDINGS: PARENCHYMA:  No intracranial mass, mass effect or midline shift  No CT signs of acute infarction  No acute intracranial hemorrhage  [ There is mild periventricular white matter low attenuation which is nonspecific and most likely related to chronic small vessel ischemic changes    There are old bilateral basal ganglia lacunar infarcts  VENTRICLES AND EXTRA-AXIAL SPACES:  Normal for the patient's age  VISUALIZED ORBITS AND PARANASAL SINUSES:  Unremarkable  CALVARIUM AND EXTRACRANIAL SOFT TISSUES:  Normal      Impression: No acute intracranial abnormality  Mild chronic small vessel ischemic changes  Old bilateral basal ganglia lacunar infarcts  Workstation performed: [id number]     Ct Recon Only Cervical Spine    Result Date: 4/3/2018  Narrative: CT CERVICAL SPINE - WITHOUT CONTRAST INDICATION: Right-sided weakness COMPARISON:  None  TECHNIQUE:  Noncontrast CT examination of the cervical spine was performed  This examination, like all CT scans performed in the [facility], was performed utilizing techniques to minimize radiation dose exposure, including the use of iterative reconstruction and automated exposure control  IMAGE QUALITY:  Diagnostic  FINDINGS: ALIGNMENT: Reversal of the normal cervical lordosis, preserved vertebral body heights  OSSEOUS STRUCTURES:  No fracture  DEGENERATIVE CHANGES: C3-C4:  Bilateral facet arthropathy and uncovertebral hypertrophy causing foraminal narrowing  C4-C5:  Bilateral facet arthropathy and uncovertebral hypertrophy causing foraminal narrowing, left greater than right  UPPER THORACIC DISC SPACES:  No significant degenerative change  PREVERTEBRAL AND PARASPINAL SOFT TISSUES:  Unremarkable  LUNG APICES:  Emphysematous changes  Impression: Reversal of the normal cervical lordosis  Degenerative changes  Workstation performed: [id number]     Vas Carotid Complete Study (*order Only If Cta Has Not Been Completed*)    Result Date: 4/3/2018  Narrative:  [facility] REPORT CLINICAL: Indications:  Patient admitted with right arm and leg weakness  Risk Factors The patient has history of HTN, hyperlipidemia and smoking (current) Clinical Right Pressure:  164/66 mm Hg, Left Pressure:  154/76 mm Hg    FINDINGS:  Right        Impression  PSV  EDV (cm/s) Direction of Flow  Dist  ICA                 72          28                     Mid  ICA                  72          29                     Prox  ICA    1 - 49%      41          13                     Dist CCA                  46          13                     Mid CCA                   57          12                     Prox CCA                  55           9                     Ext Carotid               67          10                     Prox Vert                 60          12  Antegrade          Subclavian                78          13                      Left         Impression  PSV  EDV (cm/s)  Direction of Flow  Dist  ICA                100          36                     Mid  ICA                  54          21                     Prox  ICA    1 - 49%      40          16                     Dist CCA                  67          20                     Mid CCA                   56          13                     Prox CCA                  67          14                     Ext Carotid               71          12                     Prox Vert                 64          20  Antegrade          Subclavian               112           0                        CONCLUSION:  Impression RIGHT: There is <50% stenosis noted in the internal carotid artery  Plaque is heterogenous  Vertebral artery flow is antegrade  There is no significant subclavian artery disease  LEFT: There is <50% stenosis noted in the internal carotid artery  Plaque is homogenous  Vertebral artery flow is antegrade  There is no significant subclavian artery disease  Internal carotid artery stenosis determination by consensus criteria from: Stuart Altamirano , et al  Carotid Artery Stenosis: Gray-Scale and Doppler US Diagnosis - Society of Radiologists in [address] [place] [address], Radiology 2003; [phone]    SIGNATURE: Electronically Signed by: Bea Goldberg on 2018-04-03 10:30:15 PM      PERTINENT LAB DATAS  Results for WADE, Orly Reyes (MRN [id number]) as of 4/4/2018 15:37   4/3/2018 05:20 4/4/2018 10:32   POC Glucose  40 (L)   eGFR 96    Sodium 144    Potassium 3 9    Chloride 108    CO2 26    Anion Gap 10    BUN 7    Creatinine 0 63    Glucose 92    Calcium 8 4    Phosphorus 3 5    Magnesium 1 9    Cholesterol 185    Triglycerides 80    HDL 53    LDL Calculated 116 (H)    WBC 5 45    RBC 3 88    Hematocrit 35 3    MCV 91    MCH 30 9    MCHC 34 0    RDW 15 6 (H)    Platelets 024    MPV 10 4    Hemoglobin A1C 5 4        TSH 3RD GENERATON 1 778        PHYSICAL EXAM:  Vitals:   Blood Pressure: 163/83 (04/04/18 1200)  Pulse: (!) 52 (04/04/18 1200)  Temperature: (!) 96 1 °F (35 6 °C) (04/04/18 0807)  Temp Source: Tympanic (04/04/18 0807)  Respirations: 18 (04/04/18 0807)  Height: 5' 5" (165 1 cm) (04/02/18 1800)  Weight - Scale: 70 5 kg (155 lb 6 8 oz) (04/04/18 0542)  SpO2: 98 % (04/04/18 0807)    GENERAL: AAO x 3  HEENT: atraumatic, normocephalic  Oral mucosa moist, no icterus, pallor  PERRLA +  Neck supple, no JVD, no lymphadenopathy, no thryomegaly  CHEST: B/L breath sounds heard, occasional wheezing  CVS: S1, S2  No cyanosis/clubbing or edema  ABDOMEN: Soft/obese/NT/BS heard  NEUROLOGICAL: CN II -XI grossly intact  No focal motor or sensory deficits  No signs of meningeal irritation or cerebellar dysfunction  EXTREMITIES: No cyanosis/clubbing or edema  Discharge Disposition: Home/Self Care with HOME HEALTH      Test Results Pending at Discharge: NA    Medications   Please see After Visit Summary for reconciled discharge medications provided to patient and family  Diet restrictions:        Diet Orders            Start     Ordered    04/02/18 1833  Room Service  Once     Question:  Type of Service  Answer:  Room Service-Appropriate    04/02/18 1832 04/02/18 5999  Diet Regular; Regular House  Diet effective now     Question Answer Comment   Diet Type Regular    Regular Regular House    RD to adjust diet per protocol? Yes        04/02/18 6528        Activity restrictions: No strenuous activity  Discharge Condition: good    Outpatient Follow-Up  1  Scott Phillips DO [address]  [address] / [address] [phone] - follow-up within one week  2  Johanna Toney MD -- [facility] office will call to schedule appointment in the Stroke Clinic in several weeks  Code Status: Level 1 - Full Code  Discharge Statement   I spent 33 minutes discharging the patient  This time was spent on the day of discharge  Greater than 50% of total time was spent with the patient and / or family counseling and / or coordination of care  Updated patient's  at bedside regarding discharge plan, he is fully agree agreeable  Selvin Barrera MD  HOSPITALIST SERVICES  4/4/2018    PLEASE NOTE:  This encounter was completed utilizing the [employer] Direct Speech Voice Recognition Software  Grammatical errors, random word insertions, pronoun errors and incomplete sentences are occasional consequences of the system due to software limitations, ambient noise and hardware issues  These may be missed by proof reading prior to affixing electronic signature  Any questions or concerns about the content, text or information contained within the body of this dictation should be directly addressed to the physician for clarification  Please do not hesitate to call me directly if you have any any questions or concerns

## 2018-04-04 NOTE — PLAN OF CARE
Problem: DISCHARGE PLANNING - CARE MANAGEMENT  Goal: Discharge to post-acute care or home with appropriate resources  INTERVENTIONS:  - Conduct assessment to determine patient/family and health care team treatment goals, and need for post-acute services based on payer coverage, community resources, and patient preferences, and barriers to discharge  - Address psychosocial, clinical, and financial barriers to discharge as identified in assessment in conjunction with the patient/family and health care team  - Arrange appropriate level of post-acute services according to patients   needs and preference and payer coverage in collaboration with the physician and health care team  - Communicate with and update the patient/family, physician, and health care team regarding progress on the discharge plan  - Arrange appropriate transportation to post-acute venues  Outcome: Progressing  MD planning on discharging pt home today  PT recommendation is home Pt  Pt is agreeable and made referral to [facility] for RN & Pt  Will f/u if they have accepted the case

## 2018-04-04 NOTE — PROGRESS NOTES
Progress Note - Neurology   Callum Boss 58 y o  female MRN: [id number]  Unit/Bed#: E4 -01 Encounter: [id number]    Assessment:  1  Left MCA territory acute/subacute infarct, likely small vessel thrombotic frontal lobe  2   Vascular risk factors include tobacco abuse, dyslipidemia, hypertension  3  Chronic bilateral basal ganglia lacunar infarcts (left internal capsule anterior limb, right corona radiata) appear unchanged on CTs since admission  Plan:  1  Aspirin 81 mg daily  2  Lipitor 80 mg daily  3  Risk factor modification:  Antihypertensive medication adjustment/lifestyle change/exercise/to achieve systolic blood pressure less than 140; statin and diet adjustment to reduce LDL up to 50%  4   Tobacco cessation, discussed at length  5  Stroke teaching  6  Follow up in Stroke Clinic  Subjective:   Chart reviewed, new to this examiner  PT and OT have recommended inpatient rehab, but patient has opted for home with home therapies  She is ambulating using a walker    1 episode nausea today after eating fatty foods, otherwise without complaints  Distal right upper extremity weakness without change, right lower extremity seems to be improving  Patient reiterated she had no numbness or tingling in the right hand arm    She does have chronic tingling in the left hand post injury/ORIF        Current Facility-Administered Medications:  acetaminophen 650 mg Oral Q8H [address] Abelardo Naranjo MD    amLODIPine 2 5 mg Oral Daily Thor Salcido MD    aspirin 81 mg Oral Daily Thor Salcido MD    atorvastatin 80 mg Oral QPM Kenton Larson MD    buPROPion 150 mg Oral Daily Thor Salcido MD    diazepam 5 mg Oral BID Thor Salcido MD    dicyclomine 20 mg Oral TID Thor Salcido MD    docusate sodium 100 mg Oral BID Abelardo Burton MD    enoxaparin 40 mg Subcutaneous Daily Abelardo Burton MD    fentaNYL 1 patch Transdermal Q72H Thor Salcido MD    FLUoxetine 10 mg Oral Daily Elo Hernandez MD    gabapentin 600 mg Oral TID Elo Hernandez MD    levothyroxine 25 mcg Oral Early Morning Elo Hernandez MD    lisinopril 10 mg Oral Daily Abelardo Vivas MD    methocarbamol 500 mg Oral Q8H PRN Elo Hernandez MD    metoprolol succinate 25 mg Oral Daily Elo Hernandez MD    nicotine 1 patch Transdermal Daily Elo Hernandez MD    ondansetron 4 mg Intravenous Q6H PRN Elo Hernandez MD    oxybutynin 10 mg Oral HS Vivienne Mccurdy PA-C    oxybutynin 5 mg Oral Daily Vivienne Mccurdy PA-C    oxyCODONE-acetaminophen 1 tablet Oral Q6H PRN Elo Hernandez MD    pantoprazole 40 mg Oral Daily Before Breakfast Elo Hernandez MD    polyethylene glycol 17 g Oral Daily Elo Hernandez MD    senna 1 tablet Oral Daily Elo Hernandez MD    sodium chloride 75 mL/hr Intravenous Continuous Elo Hernandez MD Last Rate: Stopped (04/04/18 1013)   sucralfate 1,000 mg Oral Q6H [address] Abelardo Vivas MD        Vitals: Blood pressure 163/83, pulse (!) 52, temperature (!) 96 1 °F (35 6 °C), temperature source Tympanic, resp  rate 18, height 5' 5" (1 651 m), weight 70 5 kg (155 lb 6 8 oz), SpO2 98 %  ,Body mass index is 25 86 kg/m²  SBP as high as 163, telemetry SR/SB  Physical Exam:   Gen:  Sitting in bed, no distress, non diaphoretic  Neuro    MSE awake, alert, fluent  No dysarthria with complex repetition  Follows commands    CN extraocular movements full and conjugate  Light touch sensation intact  + mild left mid facial weakness resting greater than volitional   Tongue midline, mobile shrug symmetric    Motor RUE: sh abd 5-, EE4+, EF5-, WE 3 (unable to sustain),  5-, IO 2               RLE: HF 5 w/ giveway, KE 5, ADF 5, APF 5  Sens light touch preserved throughout    Coord finger to nose:  Intact on left, slow slightly dystaxic on right consistent with weakness  Toe-to-finger:  Intact on left, slowed in unable to complete task on right  Lab, Imaging and other studies:   CBC:   Results from last 7 days  Lab Units 04/03/18  0520 04/02/18  1820 04/02/18  1620   WBC Thousand/uL 5 45  --  7 56   RBC Million/uL 3 88  --  4 04   HEMOGLOBIN g/dL 12 0  --  12 6   HEMATOCRIT % 35 3  --  36 8   MCV fL 91  --  91   PLATELETS Thousands/uL 264 257 270   , BMP/CMP:   Results from last 7 days  Lab Units 04/03/18  0520 04/02/18  1620   SODIUM mmol/L 144 137   POTASSIUM mmol/L 3 9 4 2   CHLORIDE mmol/L 108 101   CO2 mmol/L 26 28   ANION GAP mmol/L 10 8   BUN mg/dL 7 8   CREATININE mg/dL 0 63 0 76   GLUCOSE RANDOM mg/dL 92 88   CALCIUM mg/dL 8 4 9 3   EGFR ml/min/1 73sq m 96 84   , HgBA1C:   Results from last 7 days  Lab Units 04/03/18  0520   HEMOGLOBIN A1C % 5 4   , TSH:   Results from last 7 days  Lab Units 04/03/18  0520   TSH 3RD GENERATON uIU/mL 1 778   , Coagulation:   Results from last 7 days  Lab Units 04/02/18  1620   INR  0 86   , Lipid Profile:   Results from last 7 days  Lab Units 04/03/18  0520   HDL mg/dL 53   CHOLESTEROL mg/dL 185   LDL CALC mg/dL 116*   TRIGLYCERIDES mg/dL 80    Troponin less than 0 02, magnesium 1 9    EKG:Sinus rhythm with occasional Premature ventricular complexes Otherwise normal ECG   Echo:   LEFT VENTRICLE: Systolic function was normal  Ejection fraction was estimated to be 65 %  There were no regional wall motion abnormalities  Doppler parameters were consistent with abnormal left ventricular relaxation (grade 1 diastolic dysfunction)  LEFT ATRIUM: The atrium was mildly to moderately dilated  MITRAL VALVE: There was mild regurgitation  TRICUSPID VALVE: There was mild to moderate regurgitation  Imaging personally reviewed on PACs:  04/03: CTA head and neck: @1738  Brain: Decreased attenuation is noted in the supratentorial white matter demonstrating an appearance most consistent with mild microangiopathic change  No intracranial mass, mass effect or midline shift  No acute intracranial hemorrhage    Old right corona radiata infarct  Hypodensity in the left anterior limb internal capsule likely from old infarct  Cervical vasculature: AORTIC ARCH AND GREAT VESSELS:  Mild athersclerotic disease of the arch, proximal great vessels and visualized subclavian vessels  No significant stenosis  RIGHT VERTEBRAL ARTERY CERVICAL SEGMENT:  Normal origin  The vessel is normal in caliber throughout the neck  LEFT VERTEBRAL ARTERY CERVICAL SEGMENT:  Normal origin  The vessel is normal in caliber throughout the neck  RIGHT EXTRACRANIAL CAROTID SEGMENT:  Mild atherosclerotic disease of the distal common carotid artery and proximal cervical internal carotid artery without significant stenosis compared to the more distal ICA  LEFT EXTRACRANIAL CAROTID SEGMENT:  Mild atherosclerotic disease of the distal common carotid artery and proximal cervical internal carotid artery without significant stenosis compared to the more distal ICA  Intracranial vasculature: INTERNAL CAROTID ARTERIES:  Normal enhancement of the intracranial portions of the internal carotid arteries  Normal ophthalmic artery origins  Normal ICA terminus  ANTERIOR CIRCULATION:  Symmetric A1 segments and anterior cerebral arteries with normal enhancement  Normal anterior communicating artery  MIDDLE CEREBRAL ARTERY CIRCULATION:  M1 segment and middle cerebral artery branches demonstrate normal enhancement bilaterally  DISTAL VERTEBRAL ARTERIES:  Normal distal vertebral arteries  Posterior inferior cerebellar artery origins are normal  Normal vertebral basilar junction  BASILAR ARTERY:  Basilar artery is normal in caliber  Normal superior cerebellar arteries  POSTERIOR CEREBRAL ARTERIES: Both posterior cerebral arteries arises from the basilar tip  Both arteries demonstrate normal enhancement  Normal posterior communicating arteries   DURAL VENOUS SINUSES:  Normal     04/03 @ 1101 CT head:Decreased attenuation is noted in periventricular and subcortical white matter and bilateral internal capsules demonstrating an appearance that is statistically most likely to represent mild microangiopathic change  No CT signs of acute infarction  No intracranial mass, mass effect or midline shift  No acute parenchymal hemorrhage  04/02 CT head: No intracranial mass, mass effect or midline shift  No CT signs of acute infarction  No acute intracranial hemorrhage  [ There is mild periventricular white matter low attenuation which is nonspecific and most likely related to chronic small vessel ischemic changes  There are old bilateral basal ganglia lacunar infarcts  Diagnostic studies/imaging report review only:  Carotid ultrasound:  RIGHT:There is <50% stenosis noted in the internal carotid artery  Plaque is heterogenous  Vertebral artery flow is antegrade  There is no significant subclavian artery disease  LEFT: There is <50% stenosis noted in the internal carotid artery  Plaque is homogenous  Vertebral artery flow is antegrade  There is no significant subclavian artery disease  CT cervical spine: ALIGNMENT: Reversal of the normal cervical lordosis, preserved vertebral body heights  OSSEOUS STRUCTURES:  No fracture  DEGENERATIVE CHANGES:  C3-C4:  Bilateral facet arthropathy and uncovertebral hypertrophy causing foraminal narrowing  C4-C5:  Bilateral facet arthropathy and uncovertebral hypertrophy causing foraminal narrowing, left greater than right  UPPER THORACIC DISC SPACES:  No significant degenerative change  PREVERTEBRAL AND PARASPINAL SOFT TISSUES:  Unremarkable  LUNG APICES:  Emphysematous changes  Echocardiogram:  Study completed, interpretation pending    Counseling / Coordination of Care  Total time spent today 60 minutes  Greater than 50% of total time was spent with the patient and / or family counseling and / or coordination of care   A description of the counseling / coordination of care: Stroke teaching, tobacco cessation, risk factor modification

## 2018-04-04 NOTE — SOCIAL WORK
MD planning on discharging pt home today  PT recommendation is home Pt  Pt is agreeable and made referral to [facility] for RN & Pt  Will f/u if they have accepted the case

## 2018-04-04 NOTE — PROGRESS NOTES
Progress Note - Edgar Bearden 1955, 58 y o  female MRN: [id number]    Unit/Bed#: E4 -01 Encounter: [id number]    Primary Care Provider: Samir Valdez DO   Date and time admitted to hospital: 2018  3:18 PM    ASSESSMENT AND PLAN     1   Right-sided weakness  · Possible small-vessel stroke  · Repeat CT head from --> no acute intracranial abnormalities  · Awaiting results TTE  · Troponins x3 flat, carotid ultrasound unremarkable  · Unfortunately, due to presence of leads from neurostimulator placement, MRI brain cannot be performed  · Appreciate Neurology input  · Home visiting nursing to be set up prior to discharge  2   Chronic back pain/ lumbar stenosis /status post neurostimulator placement  · No active issues  · Patient will continue on home pain medication regimen which she is very persistent on  · Unfortunately patient had a new stimulator placement done, and MRI cannot be conducted  · Patient does state that the neurostimulator was removed, but there is still presence of leads  3   Essential hypertension  · Blood pressure is reasonable  · Continue with current antihypertensives  4   Generalized anxiety disorder/depression    · No active issues  Continue Valium, Wellbutrin, Prozac  5  Hypothyroidism    ·  TSH within normal limits  · Continue with levothyroxine  6   History of celiac disease    · No active issue  7   Tobacco use:   ·  Patient continues to smoke cigarettes  · Counseled smoking cessation  · Patient continues to tolerated a nicotine patch    _____________________________________________________________________    SUBJECTIVE:   Patient seen and examined  She appears comfortable  Right hand weakness improved    Awaiting results of 2D echocardiogram     OBJECTIVE:     Vitals:   HR:  [52-75] 52  Resp:  [18] 18  BP: (146-163)/(80-90) 163/83  SpO2:  [93 %-98 %] 98 %  Temp (24hrs), Av °F (36 7 °C), Min:96 1 °F (35 6 °C), Max:98 8 °F (37 1 °C)  Current: Temperature: (!) 96 1 °F (35 6 °C)    Intake/Output Summary (Last 24 hours) at 04/04/18 1343  Last data filed at 04/04/18 1201   Gross per 24 hour   Intake          3022 25 ml   Output                0 ml   Net          3022 25 ml       Physical Exam:   GENERAL: AAO x 3  HEENT: atraumatic, normocephalic  Oral mucosa moist, no icterus, pallor  PERRLA +  Neck supple, no JVD, no lymphadenopathy, no thryomegaly  CHEST: B/L breath sounds heard, occasional wheezing  CVS: S1, S2  No cyanosis/clubbing or edema  ABDOMEN: Soft/obese/NT/BS heard  NEUROLOGICAL: CN II -XI grossly intact  No focal motor or sensory deficits  No signs of meningeal irritation or cerebellar dysfunction  EXTREMITIES: No cyanosis/clubbing or edema  LABS:  Reviewed      Scheduled Meds:    Current Facility-Administered Medications:  acetaminophen 650 mg Oral Q8H Charmaine Dominugez MD    amLODIPine 2 5 mg Oral Daily Robyn Calloway MD    aspirin 81 mg Oral Daily Robyn Calloway MD    atorvastatin 80 mg Oral QPM Abdi South MD    buPROPion 150 mg Oral Daily Robyn Calloway MD    diazepam 5 mg Oral BID Robyn Calloway MD    dicyclomine 20 mg Oral TID Robyn Calloway MD    docusate sodium 100 mg Oral BID Robyn Calloway MD    enoxaparin 40 mg Subcutaneous Daily Robyn Calloway MD    fentaNYL 1 patch Transdermal Q72H Robyn Calloway MD    FLUoxetine 10 mg Oral Daily Abelardo Aabd MD    gabapentin 600 mg Oral TID Robyn Calloway MD    levothyroxine 25 mcg Oral Early Morning Robyn Calloway MD    lisinopril 10 mg Oral Daily Robyn Calloway MD    methocarbamol 500 mg Oral Q8H PRN Robyn Calloway MD    metoprolol succinate 25 mg Oral Daily Robyn Calloway MD    nicotine 1 patch Transdermal Daily Robyn Calloway MD    ondansetron 4 mg Intravenous Q6H PRN Robyn Calloway MD    oxybutynin 10 mg Oral HS Vivienne Mccurdy PA-C    oxybutynin 5 mg Oral Daily Vivienne Freitas PA-C oxyCODONE-acetaminophen 1 tablet Oral Q6H PRN Loly Fields MD    pantoprazole 40 mg Oral Daily Before Breakfast Loly Fields MD    polyethylene glycol 17 g Oral Daily Abelardo Gomez MD    senna 1 tablet Oral Daily Abelardo Gomez MD    sodium chloride 75 mL/hr Intravenous Continuous Loly Fields MD Last Rate: Stopped (04/04/18 1013)   sucralfate 1,000 mg Oral Q6H [address] Abelardo Gomez MD        PRN Meds:  methocarbamol    ondansetron    oxyCODONE-acetaminophen      VTE Prophylaxis:  Enoxaparin subcutaneously  Patient Centered Rounds: I have discussed today's plans with nursing staff today  DISPOSITION:  Awaiting results of TTE  Anticipate discharge today  Time Spent for Care: 20 minutes   More than 50% of total time spent on counseling and coordination of care as described above  Family will be updated  [address], MD  HOSPITALIST SERVICES  4/4/2018    PLEASE NOTE:  This encounter was completed utilizing the [employer] Direct Speech Voice Recognition Software  Grammatical errors, random word insertions, pronoun errors and incomplete sentences are occasional consequences of the system due to software limitations, ambient noise and hardware issues  These may be missed by proof reading prior to affixing electronic signature  Any questions or concerns about the content, text or information contained within the body of this dictation should be directly addressed to the physician for clarification  Please do not hesitate to call me directly if you have any any questions or concerns

## 2018-04-04 NOTE — PLAN OF CARE
DISCHARGE PLANNING     Discharge to home or other facility with appropriate resources Completed        DISCHARGE PLANNING - CARE MANAGEMENT     Discharge to post-acute care or home with appropriate resources Completed        INFECTION - ADULT     Absence or prevention of progression during hospitalization Completed     Absence of fever/infection during neutropenic period Completed        Knowledge Deficit     Patient/family/caregiver demonstrates understanding of disease process, treatment plan, medications, and discharge instructions Completed        METABOLIC, FLUID AND ELECTROLYTES - ADULT     Electrolytes maintained within normal limits Completed     Fluid balance maintained Completed     Glucose maintained within target range Completed        MUSCULOSKELETAL - ADULT     Maintain or return mobility to safest level of function Completed     Maintain proper alignment of affected body part Completed        NEUROSENSORY - ADULT     Achieves stable or improved neurological status Completed     Absence of seizures Completed     Remains free of injury related to seizures activity Completed     Achieves maximal functionality and self care Completed        Nutrition/Hydration-ADULT     Nutrient/Hydration intake appropriate for improving, restoring or maintaining nutritional needs Completed        PAIN - ADULT     Verbalizes/displays adequate comfort level or baseline comfort level Completed        Potential for Falls     Patient will remain free of falls Completed        SAFETY ADULT     Maintain or return to baseline ADL function Completed     Maintain or return mobility status to optimal level Completed        SKIN/TISSUE INTEGRITY - ADULT     Skin integrity remains intact Completed     Incision(s), wounds(s) or drain site(s) healing without S/S of infection Completed     Oral mucous membranes remain intact Completed

## 2018-04-05 ENCOUNTER — TRANSITIONAL CARE MANAGEMENT (OUTPATIENT)
Dept: FAMILY MEDICINE CLINIC | Facility: CLINIC | Age: 63
End: 2018-04-05

## 2018-04-05 ENCOUNTER — TELEPHONE (OUTPATIENT)
Dept: NEUROLOGY | Facility: CLINIC | Age: 63
End: 2018-04-05

## 2018-04-05 DIAGNOSIS — F41.9 ANXIETY: ICD-10-CM

## 2018-04-05 DIAGNOSIS — G89.4 CHRONIC PAIN DISORDER: ICD-10-CM

## 2018-04-05 RX ORDER — DIAZEPAM 5 MG/1
5 TABLET ORAL 2 TIMES DAILY
Qty: 60 TABLET | Refills: 0 | OUTPATIENT
Start: 2018-04-05 | End: 2018-05-04 | Stop reason: SDUPTHER

## 2018-04-05 RX ORDER — OXYCODONE HYDROCHLORIDE AND ACETAMINOPHEN 5; 325 MG/1; MG/1
1 TABLET ORAL EVERY 6 HOURS PRN
Qty: 120 TABLET | Refills: 0 | Status: SHIPPED | OUTPATIENT
Start: 2018-04-05 | End: 2018-05-02 | Stop reason: SDUPTHER

## 2018-04-05 NOTE — CASE MANAGEMENT
Notification of Discharge  This is a Notification of Discharge from our facility [address]  Please be advised that this patient has been discharge from our facility  Below you will find the admission and discharge date and time including the patients disposition  PRESENTATION DATE: 4/2/2018  3:18 PM  IP ADMISSION DATE: 4/2/18 1656  DISCHARGE DATE: 4/4/2018  5:17 PM  DISPOSITION: Home with [address] [place] [address] in the [facility] [address] for 2017  Network Utilization Review Department  Phone: [phone]; Fax [phone]  ATTENTION: The Network Utilization Review Department is now centralized for our 7 Facilities  Make a note that we have a new phone and fax numbers for our Department  Please call with any questions or concerns to [phone] and carefully follow the prompts so that you are directed to the right person  All voicemails are confidential  Fax any determinations, approvals, denials, and requests for initial or continue stay review clinical to [phone]  Due to HIGH CALL volume, it would be easier if you could please send faxed requests to expedite your requests and in part, help us provide discharge notifications faster

## 2018-04-06 ENCOUNTER — TELEPHONE (OUTPATIENT)
Dept: FAMILY MEDICINE CLINIC | Facility: CLINIC | Age: 63
End: 2018-04-06

## 2018-04-08 DIAGNOSIS — G89.4 CHRONIC PAIN SYNDROME: ICD-10-CM

## 2018-04-08 RX ORDER — METHOCARBAMOL 500 MG/1
TABLET, FILM COATED ORAL
Qty: 90 TABLET | Refills: 0 | Status: SHIPPED | OUTPATIENT
Start: 2018-04-08 | End: 2018-05-04 | Stop reason: SDUPTHER

## 2018-04-10 ENCOUNTER — OFFICE VISIT (OUTPATIENT)
Dept: FAMILY MEDICINE CLINIC | Facility: CLINIC | Age: 63
End: 2018-04-10

## 2018-04-10 VITALS
SYSTOLIC BLOOD PRESSURE: 132 MMHG | DIASTOLIC BLOOD PRESSURE: 62 MMHG | WEIGHT: 158 LBS | HEIGHT: 65 IN | BODY MASS INDEX: 26.33 KG/M2

## 2018-04-10 DIAGNOSIS — G89.29 OTHER CHRONIC PAIN: ICD-10-CM

## 2018-04-10 DIAGNOSIS — I63.512 ACUTE ISCHEMIC LEFT MCA STROKE (HCC): Primary | ICD-10-CM

## 2018-04-10 DIAGNOSIS — I10 ESSENTIAL HYPERTENSION: Chronic | ICD-10-CM

## 2018-04-10 PROCEDURE — TCMS TRANSITION OF CARE: Performed by: FAMILY MEDICINE

## 2018-04-10 RX ORDER — FENTANYL 50 UG/H
1 PATCH TRANSDERMAL
Qty: 10 PATCH | Refills: 0 | Status: SHIPPED | OUTPATIENT
Start: 2018-04-10 | End: 2018-05-15 | Stop reason: SDUPTHER

## 2018-04-10 NOTE — PROGRESS NOTES
Assessment/Plan:       Diagnoses and all orders for this visit:    Acute ischemic left MCA stroke ([facility] [address] )  Comments:  Acute / subacute infarct, likely small vessel thrombotic frontal infarct    Other chronic pain  -     fentaNYL (DURAGESIC) 50 mcg/hr; Place 1 patch on the skin every third day Max Daily Amount: 1 patch    Essential hypertension         patient is status post hospitalization for a subacute acute left MCA infarct  Possible old thrombotic frontal infarct  She is to continue new medication of aspirin and high-dose statin  At some point in time another lipid will need to be drawn  She does have follow-up with Neurology  She will hopefully have some home PT especially to strengthen her right upper extremity  She will have formal follow-up here in 3 months  Continue all for more meds as outlined  More than 25 minutes were spent with the patient outlining PINKY  Greater than 50% counseling was done  Subjective:         Chief Complaint   Patient presents with    Transition of Care Management     discuss medications     Patient ID: Thony Loco is a 58 y o  female  Chief Complaint   Patient presents with    Transition of Care Management     discuss medications        Patient is a 70-year-old female who is here for follow-up from hospitalization where she had symptoms of right-sided hand weakness and during admission was found to have a left MCA distribution acute/subacute infarct  Possibly had an old frontal infarct  No previous symptoms  Patient related that she had some symptoms of hand numbness tingling on Friday before admission but felt she may have slept wrong  She has chronic right leg weakness due to failed lumbar surgery  The symptoms continued intermittently over the weekend  She saw the [facility] [address] our nurse practitioner on Monday and was sent to the ER for evaluation and admitted  Multiple diagnostic tests as noted MRA MRI of the neck and brain    There is no significant carotid stenosis  She is not on any blood thinners other than 81 mg of aspirin and now high dose atorvastatin 80 mg  She already has neurology follow-up set up in a couple weeks  She normally took 50 mg of metoprolol as an outpatient but on time of discharge it was at 25 mg  Pulse rate here today is only 44 and I have instructed to continue the 25 mg once daily  The following portions of the patient's history were reviewed and updated as appropriate: allergies, current medications, past family history, past social history and problem list     Review of Systems   Constitutional: Negative for appetite change  HENT: Negative  Respiratory: Negative for cough and shortness of breath  Cardiovascular: Negative for chest pain and leg swelling  Gastrointestinal:        GI issues are fairly stable   Genitourinary: Negative  Musculoskeletal: Positive for arthralgias and back pain  Chronic pain stable   Neurological:        As noted in HPI  Patient still has some residual hand symptoms  She is using a squeeze ball to try to strengthen  VNA will be come to the house but PT has not been initiated  Psychiatric/Behavioral: Negative            Objective:  Date and time hospital follow up call was made:  4/5/2018  1:18 PM  Patient was hopsitalized at:  [facility]  Date of admission:  4/2/18  Date of discharge:  4/4/18  Diagnosis:  Micro Stroke  Were the patients medicaitons reviewed and updated:  Yes  Current symptoms:  Weakness  Weakness severity:  Mild  Scheduled for follow up?:  Yes  Patients specialists:  Neurologist  Did you obtain your prescribed medications:  Yes  Do you need help managing your perscriptions or medications:  No  Is transportation to your appointments needed:  No  I have advised the patient to call PCP with any new or worsening symptoms (please type in name along with any credentials):  Poonam Quiñones MA  Counseling:  Patient     Left MCA territory acute/ subacute infarct, likely small vessel thrombotic frontal infarct  /62   Ht 5' 5" (1 651 m)   Wt 71 7 kg (158 lb)   BMI 26 29 kg/m²      Physical Exam   Constitutional: She is oriented to person, place, and time  She appears well-developed and well-nourished  HENT:   Head: Normocephalic and atraumatic  No facial asymmetry no droop   Eyes: EOM are normal  Pupils are equal, round, and reactive to light  No nyst   Neck: Normal range of motion  Cardiovascular: Normal rate and intact distal pulses  Pulmonary/Chest: Effort normal and breath sounds normal    Abdominal: Soft  Bowel sounds are normal    Musculoskeletal: Normal range of motion  Neurological: She is alert and oriented to person, place, and time  She displays normal reflexes  She exhibits normal muscle tone  Patient has negative Romberg her finger-to-nose pointing is fairly intact  There is no tremor  Grass seems to be +4/5 right +5/5 left  Patient does tandem walking fairly well  Questionable slight hyper reflexia right brachial   Psychiatric: She has a normal mood and affect   Her behavior is normal  Judgment and thought content normal

## 2018-04-11 PROBLEM — R53.1 RIGHT SIDED WEAKNESS: Status: RESOLVED | Noted: 2018-04-02 | Resolved: 2018-04-11

## 2018-04-21 DIAGNOSIS — K21.9 GASTROESOPHAGEAL REFLUX DISEASE WITHOUT ESOPHAGITIS: Primary | ICD-10-CM

## 2018-04-21 DIAGNOSIS — Z72.0 TOBACCO USE: Chronic | ICD-10-CM

## 2018-04-21 DIAGNOSIS — N32.81 OAB (OVERACTIVE BLADDER): ICD-10-CM

## 2018-04-23 RX ORDER — SUCRALFATE 1 G/1
TABLET ORAL
Qty: 360 TABLET | Refills: 3 | Status: ON HOLD | OUTPATIENT
Start: 2018-04-23 | End: 2018-07-31

## 2018-04-23 RX ORDER — OXYBUTYNIN CHLORIDE 5 MG/1
TABLET ORAL
Qty: 270 TABLET | Refills: 3 | Status: SHIPPED | OUTPATIENT
Start: 2018-04-23 | End: 2019-04-10 | Stop reason: SDUPTHER

## 2018-04-23 RX ORDER — BUPROPION HYDROCHLORIDE 150 MG/1
TABLET, EXTENDED RELEASE ORAL DAILY
Qty: 90 TABLET | Refills: 3 | Status: SHIPPED | OUTPATIENT
Start: 2018-04-23 | End: 2019-04-11 | Stop reason: SDUPTHER

## 2018-04-26 ENCOUNTER — OFFICE VISIT (OUTPATIENT)
Dept: NEUROLOGY | Facility: CLINIC | Age: 63
End: 2018-04-26
Payer: COMMERCIAL

## 2018-04-26 VITALS
SYSTOLIC BLOOD PRESSURE: 160 MMHG | HEIGHT: 65 IN | BODY MASS INDEX: 26.19 KG/M2 | DIASTOLIC BLOOD PRESSURE: 82 MMHG | HEART RATE: 66 BPM | WEIGHT: 157.2 LBS

## 2018-04-26 DIAGNOSIS — I10 ESSENTIAL HYPERTENSION: Primary | Chronic | ICD-10-CM

## 2018-04-26 DIAGNOSIS — R53.1 RIGHT SIDED WEAKNESS: ICD-10-CM

## 2018-04-26 DIAGNOSIS — I63.9 CEREBROVASCULAR ACCIDENT (CVA), UNSPECIFIED MECHANISM (HCC): ICD-10-CM

## 2018-04-26 PROCEDURE — 99215 OFFICE O/P EST HI 40 MIN: CPT | Performed by: NURSE PRACTITIONER

## 2018-04-26 RX ORDER — ATORVASTATIN CALCIUM 80 MG/1
80 TABLET, FILM COATED ORAL EVERY EVENING
Qty: 90 TABLET | Refills: 2
Start: 2018-04-26 | End: 2018-05-04 | Stop reason: SDUPTHER

## 2018-04-26 NOTE — ASSESSMENT & PLAN NOTE
Pt with recent left MCA infarct, doing well, some residual right sided weakness, thought to be sml vessel   Pt with multiple risk factors   Now on ASA/ statin therapy  BP stable   not diabetic, HgA1c 5 5  Pt still smoking, offered cessation program, declined   reviewed s/s cva  If any questions need to go to ED

## 2018-04-26 NOTE — PROGRESS NOTES
Patient ID: Arcadio Fajardo is a 58 y o  female  Assessment/Plan:    Cerebrovascular accident (CVA) ([address] )  Pt with recent left MCA infarct, doing well, some residual right sided weakness, thought to be sml vessel  Pt with multiple risk factors   Now on ASA/ statin therapy  BP stable   not diabetic, HgA1c 5 5  Pt still smoking, offered cessation program, declined   reviewed s/s cva  If any questions need to go to ED       Diagnoses and all orders for this visit:    Essential hypertension    Right sided weakness  -     atorvastatin (LIPITOR) 80 mg tablet; Take 1 tablet (80 mg total) by mouth every evening    Cerebrovascular accident (CVA), unspecified mechanism (HCC)  -     atorvastatin (LIPITOR) 80 mg tablet; Take 1 tablet (80 mg total) by mouth every evening         Left MCA territory acute/subacute infarct, likely small vessel thrombotic frontal lobe  2   Vascular risk factors include tobacco abuse, dyslipidemia, hypertension  3  Chronic bilateral basal ganglia lacunar infarcts (left internal capsule anterior limb, right corona radiata) appear unchanged on CTs since admission  Subjective:    JENARO Pritchett a delightful 58-year-old lady with past medical history pmh of HTN, fibromyalgia, gastric bypass with malabsorption syndrome, chronic back pain s/p neuro stimulator removal, restless leg syndrome, peripheral neuropathy who was recent hospitalized for CVA  Patient states that she woke up on Friday with right hand numbness and weakness Her hand was like a "claw"    She typically stated that she could not grasp, any objects with the right hand    She had some increased right leg weakness, no falls, she has some prior issues due to Lumbar surgery in past  Associated tingling numbness   She felt that this would go away hence did not seek any medical attention   It persisted all throughout the weekend -- patient presented to the  PCP  On the following Monday and was noted to have right-sided weakness and subsequently sent to the  ED  /73 in ER  She was seen by neurology  She had CT head done ( SCS IPG neuro stimulator removed in 7/14/17 but lead wires remain due to the location they were difficult to remove) demonstrating mild chronic small vessel ischemic changes and old bilateral basal ganglia lacunar infarcts but no acute intracranial abnormalities  Pt was unaware of any prior CVA  No history of DM, A-fib  ECHO EF 65%,  Carotids < 50% stenosis B/L  , HDL 53, Triglycerides 80, TSH 1 778  She was not on any prehosp AC  She was started on ASA, statin therapy  She had some improvement in her right side  She was discharged with home P T  Today she states that she is doing better  Her hand has improved, her writing is better , she can use her hand more  She notes some right leg weakness, no falls, he uses a cane when outside  She did have home P T  And is continuing with her exercises  She is on ASA/Statin, no side effects  Her Gabapentin was decreased in the hospital, felt to be contributing to her balance  In doing so she was experiencing increased pain and this was increased back up  She states that her feet are better, She does describe electric like sensation in her right UE,  She is on chronic pain medication from her PCP for her LBP  Unfortunately she continues to smoke  Down to 5 cigarettes from a pack  She was offered a smoking cessation program which she refused  Chantix I past was not beneficial       The following portions of the patient's history were reviewed and updated as appropriate: allergies, current medications, past family history, past medical history, past social history, past surgical history and problem list          Objective:    Blood pressure 160/82, pulse 66, height 5' 5" (1 651 m), weight 71 3 kg (157 lb 3 2 oz)  Physical Exam   Constitutional: She appears well-developed  HENT:   Head: Normocephalic     Eyes: Pupils are equal, round, and reactive to light    Neck: Normal range of motion  Cardiovascular: Normal rate and regular rhythm  No bruti   Pulmonary/Chest: Effort normal    Musculoskeletal: Normal range of motion  Neurological: Gait normal    Psychiatric: She has a normal mood and affect  Her speech is normal and behavior is normal  Judgment and thought content normal        Neurological Exam    Mental Status  The patient is and oriented to person, place, time, and situation  Her recent and remote memory are normal  Her speech is normal  Her language is fluent with no aphasia  She has normal attention span and concentration  She follows multi-step commands  She has a normal fund of knowledge  Cranial Nerves  CN I: The patient has not tested  CN II: The patient's visual acuity and visual fields are normal  Funduscopic exam performed  Right: The right disc is normal   Left: The left disc is normal   CN III, IV, VI: The patient's pupils are equally round and reactive to light  CN V:  The patient has division II sensory deficit on the left  CN VII:  The patient has symmetric facial movement  CN VIII:  The patient's hearing is normal   CN XI: The patient's shoulder shrug strength is normal   CN XII: The patient's tongue is midline  Motor  The patient has normal muscle bulk throughout  Her overall muscle tone is normal throughout  Minor decreased hand grasp right, SCM 5/5 B/L  LE left 5/5, Right HF 4-, KE4, dorsi 5-     Sensory   She has a nondermatomal sensory deficit  reports decreased sensation temperature/vibration left vs right     Reflexes  Left > right     Gait and Coordination  The patient has normal gait and station  She has normal right finger to nose coordination  Slower on left, off point left, uses a cane         ROS:    Review of Systems   Constitutional: Positive for fatigue  Negative for fever  HENT: Negative for trouble swallowing and voice change  Normacephalic, atraumatic   Eyes: Negative for visual disturbance  Respiratory: Negative  Negative for chest tightness and shortness of breath  Cardiovascular: Negative  Negative for chest pain and leg swelling  Gastrointestinal: Negative  Endocrine: Negative  Genitourinary: Negative for difficulty urinating, frequency and urgency  Musculoskeletal: Positive for back pain and neck pain  Negative for arthralgias and gait problem  Skin: Negative  Allergic/Immunologic: Negative  Neurological: Positive for dizziness, tremors, weakness, numbness and headaches  Negative for speech difficulty  Balance problems  Difficulty walking  Cramping  Tingling     Hematological: Negative  Psychiatric/Behavioral: Positive for sleep disturbance  Negative for behavioral problems  Carotid ultrasound:  RIGHT:There is <50% stenosis noted in the internal carotid artery  Plaque is heterogenous  vertebral artery flow is antegrade  There is no significant subclavian artery disease  LEFT: There is <50% stenosis noted in the internal carotid artery  Plaque is homogenous  Vertebral artery flow is antegrade  There is no significant subclavian artery disease  ECHO:  LEFT VENTRICLE: Systolic function was normal  Ejection fraction was estimated to be 65 %  There were no regional wall motion abnormalities  Doppler parameters were consistent with abnormal left ventricular relaxation (grade 1 diastolic dysfunction)  LEFT ATRIUM: The atrium was mildly to moderately dilated  MITRAL VALVE: There was mild regurgitation  TRICUSPID VALVE: There was mild to moderate regurgitation      CT Head w 4/2/18:No acute intracranial abnormality  Mild chronic small vessel ischemic changes  Old bilateral basal ganglia lacunar infarcts  CTA:1  No evidence of hemodynamic stenosis  2   No evidence of acute intracranial hemorrhage  CT  cervical: Reversal of the normal cervical lordosis  degenerative changes

## 2018-04-30 DIAGNOSIS — I10 ESSENTIAL HYPERTENSION: ICD-10-CM

## 2018-04-30 RX ORDER — AMLODIPINE BESYLATE 2.5 MG/1
TABLET ORAL
Qty: 90 TABLET | Refills: 0 | Status: SHIPPED | OUTPATIENT
Start: 2018-04-30 | End: 2018-07-28 | Stop reason: SDUPTHER

## 2018-05-02 DIAGNOSIS — G89.4 CHRONIC PAIN DISORDER: ICD-10-CM

## 2018-05-02 RX ORDER — OXYCODONE HYDROCHLORIDE AND ACETAMINOPHEN 5; 325 MG/1; MG/1
1 TABLET ORAL EVERY 6 HOURS PRN
Qty: 120 TABLET | Refills: 0 | Status: SHIPPED | OUTPATIENT
Start: 2018-05-02 | End: 2018-06-04 | Stop reason: SDUPTHER

## 2018-05-03 ENCOUNTER — TELEPHONE (OUTPATIENT)
Dept: FAMILY MEDICINE CLINIC | Facility: CLINIC | Age: 63
End: 2018-05-03

## 2018-05-03 DIAGNOSIS — I63.9 CEREBROVASCULAR ACCIDENT (CVA), UNSPECIFIED MECHANISM (HCC): ICD-10-CM

## 2018-05-03 DIAGNOSIS — R53.1 RIGHT SIDED WEAKNESS: ICD-10-CM

## 2018-05-03 NOTE — TELEPHONE ENCOUNTER
Patient states she needs a refill on her statin however the last person to authorize this medication was a [facility]  Is this ok to refill under your name?

## 2018-05-04 DIAGNOSIS — F41.9 ANXIETY: ICD-10-CM

## 2018-05-04 DIAGNOSIS — G89.4 CHRONIC PAIN SYNDROME: ICD-10-CM

## 2018-05-04 RX ORDER — ATORVASTATIN CALCIUM 80 MG/1
80 TABLET, FILM COATED ORAL EVERY EVENING
Qty: 90 TABLET | Refills: 0
Start: 2018-05-04 | End: 2018-07-30 | Stop reason: SDUPTHER

## 2018-05-04 RX ORDER — DIAZEPAM 5 MG/1
5 TABLET ORAL 2 TIMES DAILY
Qty: 60 TABLET | Refills: 1 | Status: SHIPPED | OUTPATIENT
Start: 2018-05-04 | End: 2018-06-04 | Stop reason: SDUPTHER

## 2018-05-04 RX ORDER — METHOCARBAMOL 500 MG/1
500 TABLET, FILM COATED ORAL EVERY 8 HOURS PRN
Qty: 90 TABLET | Refills: 1 | Status: SHIPPED | OUTPATIENT
Start: 2018-05-04 | End: 2018-05-15 | Stop reason: SDUPTHER

## 2018-05-06 DIAGNOSIS — G89.4 CHRONIC PAIN SYNDROME: ICD-10-CM

## 2018-05-07 RX ORDER — METHOCARBAMOL 500 MG/1
TABLET, FILM COATED ORAL
Qty: 90 TABLET | Refills: 1 | Status: SHIPPED | OUTPATIENT
Start: 2018-05-07 | End: 2018-05-15 | Stop reason: SDUPTHER

## 2018-05-15 DIAGNOSIS — I10 ESSENTIAL HYPERTENSION: ICD-10-CM

## 2018-05-15 DIAGNOSIS — G89.29 OTHER CHRONIC PAIN: ICD-10-CM

## 2018-05-15 RX ORDER — FENTANYL 50 UG/H
1 PATCH TRANSDERMAL
Qty: 10 PATCH | Refills: 0 | Status: SHIPPED | OUTPATIENT
Start: 2018-05-15 | End: 2018-06-14 | Stop reason: SDUPTHER

## 2018-05-15 RX ORDER — METOPROLOL SUCCINATE 25 MG/1
TABLET, EXTENDED RELEASE ORAL
Qty: 90 TABLET | Refills: 0 | Status: SHIPPED | OUTPATIENT
Start: 2018-05-15 | End: 2018-08-23 | Stop reason: SDUPTHER

## 2018-05-17 ENCOUNTER — CLINICAL SUPPORT (OUTPATIENT)
Dept: FAMILY MEDICINE CLINIC | Facility: CLINIC | Age: 63
End: 2018-05-17

## 2018-05-17 DIAGNOSIS — Z02.83 ENCOUNTER FOR DRUG SCREENING: Primary | ICD-10-CM

## 2018-05-18 ENCOUNTER — OFFICE VISIT (OUTPATIENT)
Dept: FAMILY MEDICINE CLINIC | Facility: CLINIC | Age: 63
End: 2018-05-18
Payer: COMMERCIAL

## 2018-05-18 VITALS
DIASTOLIC BLOOD PRESSURE: 68 MMHG | BODY MASS INDEX: 26.52 KG/M2 | HEART RATE: 64 BPM | HEIGHT: 65 IN | WEIGHT: 159.2 LBS | SYSTOLIC BLOOD PRESSURE: 138 MMHG

## 2018-05-18 DIAGNOSIS — G89.29 CHRONIC LEFT SHOULDER PAIN: Primary | ICD-10-CM

## 2018-05-18 DIAGNOSIS — M25.512 CHRONIC LEFT SHOULDER PAIN: Primary | ICD-10-CM

## 2018-05-18 DIAGNOSIS — Z12.39 SCREENING FOR MALIGNANT NEOPLASM OF BREAST: ICD-10-CM

## 2018-05-18 DIAGNOSIS — I10 ESSENTIAL HYPERTENSION: Chronic | ICD-10-CM

## 2018-05-18 DIAGNOSIS — M54.2 NECK PAIN: ICD-10-CM

## 2018-05-18 PROCEDURE — 99214 OFFICE O/P EST MOD 30 MIN: CPT | Performed by: NURSE PRACTITIONER

## 2018-05-18 PROCEDURE — 3075F SYST BP GE 130 - 139MM HG: CPT | Performed by: NURSE PRACTITIONER

## 2018-05-18 PROCEDURE — 3078F DIAST BP <80 MM HG: CPT | Performed by: NURSE PRACTITIONER

## 2018-05-18 RX ORDER — PREDNISONE 10 MG/1
TABLET ORAL
Qty: 21 TABLET | Refills: 0 | Status: SHIPPED | OUTPATIENT
Start: 2018-05-18 | End: 2018-07-12

## 2018-05-18 NOTE — PROGRESS NOTES
Assessment/Plan:    Left shoulder pain/neck pain  Will get x-ray of left shoulder due to frequent falls at home  Will rule out any acute abnormality  Patient was started on prednisone taper [id number] for left shoulder pain and left neck pain  These 2 things seem to be related  Pain is reproducible  Patient has +5 normal strength bilaterally with normal range of motion  She is to call if any worsening of symptoms or no improvement  If x-ray is within normal limits we will then proceed to physical therapy  Diarrhea: Will change the way she takes miralax to every other day  Per patient she has battled with ranging from constipation to diarrhea and is unable to find good rhythm with using stool softeners due to her chronic opioid use  Patient was recommended to try MiraLax every other day and see if this helps  She has never tried this before  She is to notify us if any worsening symptoms or no improvement  Patient is to call if any worsening symptoms or no improvement  Case was discussed with Dr Robert Carr who also came in to see the patient  Patient verbalizes understand and agrees with treatment plan  Diagnoses and all orders for this visit:    Chronic left shoulder pain  -     predniSONE 10 mg tablet; Day 1: 6 tabs  Day 2: 5 tabs Day 3: 4 tabs  Day 4: 3 tabs  Day 5: 2 tabs  Day 6: 1 tab  -     XR shoulder 2+ vw left; Future    Neck pain    Screening for malignant neoplasm of breast  -     Mammo screening bilateral w cad; Future    Essential hypertension          Subjective:      Patient ID: Edgar Bearden is a 61 y o  female  Chief Complaint   Patient presents with    Headache     over 1 month    Arm Pain     Left, pt states it feels like it is vibrating and tender to touch    GI Problem     contantly making bowel movemnts       Patient presents to office today for headache in the back of her neck and left shoulder and arm pain and diarrhea     She has had the pain at the base left upper neck and left shoulder for about 1 month now  Patient states she still has good strength and full range of motion which she does get shooting vibrating pain from her shoulder into her arm  She can reproduce this pain by pushing on her anterior shoulder and toward the base of her neck  The neck pain and shoulder pain seem to be related  Pain description of shooting and burning sounds like a neuralgia  Patient does have frequent falls and believes she may have hurt her left shoulder during 1 of the falls  Patient takes blood pressure medications as directed every day and checks her pressure every day after taking medication  Patient states she still gets averages of 140s-150s SBP  Due to patient's history blood pressure should be better control  Will talk to  Patient a bowel changing medications to better control  Patient states she has decreased her smoking to now 5 cigarettes a day  Patient is having a hard time controlling her bowel movements  For awhile she has been having trouble regulating since she ranges from diarrhea to constipation  She has chronic constipation due to chronic opioid use  She uses MiraLax but this also causes diarrhea  Patient usually does the MiraLax every day  Patient denies any neurological changes  The following portions of the patient's history were reviewed and updated as appropriate: allergies, current medications, past family history, past social history and problem list     Review of Systems   Constitutional: Negative for chills and fever  HENT: Negative for congestion  Eyes: Negative for visual disturbance  Respiratory: Positive for cough (dry)  Negative for chest tightness and shortness of breath  Cardiovascular: Negative for chest pain  Gastrointestinal: Positive for diarrhea (fluctuates )  Negative for abdominal pain, nausea and vomiting  Genitourinary: Negative for difficulty urinating and dysuria     Musculoskeletal: Positive for arthralgias (left shoulder )  Negative for myalgias  Skin: Negative for rash  Neurological: Positive for headaches  Negative for dizziness, tremors, syncope, facial asymmetry, speech difficulty, weakness, light-headedness and numbness  Psychiatric/Behavioral: Negative for agitation  Objective:  /68 (BP Location: Left arm, Patient Position: Sitting, Cuff Size: Standard)   Pulse 64   Ht 5' 4 5" (1 638 m)   Wt 72 2 kg (159 lb 3 2 oz)   BMI 26 90 kg/m²      Physical Exam   Constitutional: She is oriented to person, place, and time  She appears well-developed  No distress  HENT:   Head: Normocephalic and atraumatic  Right Ear: External ear normal    Left Ear: External ear normal    Nose: Nose normal    Eyes: Conjunctivae, EOM and lids are normal  Pupils are equal, round, and reactive to light  Right eye exhibits no discharge  Left eye exhibits no discharge  Neck: Normal range of motion  Neck supple  No tracheal deviation present  Cardiovascular: Normal rate and regular rhythm  No murmur heard  Pulmonary/Chest: Effort normal  No respiratory distress  She has wheezes (inspiratory/expiratory)  Rhonchi, coarse  Left worse than right   Abdominal: Soft  Bowel sounds are normal  She exhibits no distension  There is no tenderness  There is no guarding  Musculoskeletal: Normal range of motion  She exhibits tenderness (left shoulder (anterior and posterior near neck))  She exhibits no edema or deformity  +5 equal strength bilaterally  Full ROM  No tenderness with movement  Tenderness with palpation    Lymphadenopathy:     She has no cervical adenopathy  Neurological: She is alert and oriented to person, place, and time  Coordination abnormal    Patient has chronic slight left facial droop  Otherwise normal WNL  No changes  Chronic ambulatory dysfunction   Skin: Skin is warm and dry  No rash noted  She is not diaphoretic  No erythema  Psychiatric: She has a normal mood and affect  Her speech is normal and behavior is normal  Judgment and thought content normal  Cognition and memory are normal    Nursing note and vitals reviewed

## 2018-05-18 NOTE — ASSESSMENT & PLAN NOTE
Per patient these numbers are not consistent well controlled at home  Will increase Norvasc to 5 milligrams daily from 2 5 milligrams daily  Patient will check blood pressure before medication in 1-2 hours after medication every day for 1 week and call next week with numbers  Will titrate medication accordingly

## 2018-05-18 NOTE — PROGRESS NOTES
Assessment/Plan:      There are no diagnoses linked to this encounter  Subjective:     Patient ID: Millie Ocasio is a 61 y o  female      HPI    Review of Systems      Objective:     Physical Exam

## 2018-05-18 NOTE — PATIENT INSTRUCTIONS
Start taking prednisone for shoulder and neck pain  This is 6-5-4-3-2-1  Decrease by 1 pill each day  Take with food  Increase amlodipine from 2 5 mg to 5 mg  Take blood pressure before medication and 1-2 hours after medication  Write them down for 1 week and call us in one week to see if any medication changes need to be made  Alternate miralax to every other day to see if this helps with diarrhea constipation balance  Call if no improvement or any worsening of symptoms  Always continue to monitor for any stroke symptoms and if you experience any, call [phone]

## 2018-05-21 DIAGNOSIS — K21.9 GASTROESOPHAGEAL REFLUX DISEASE, ESOPHAGITIS PRESENCE NOT SPECIFIED: ICD-10-CM

## 2018-05-21 LAB
1OH-MIDAZOLAM UR-MCNC: NEGATIVE NG/ML
A-OH ALPRAZ UR-MCNC: NEGATIVE NG/ML
A-OH-TRIAZOLAM UR-MCNC: NEGATIVE NG/ML
AMPHETAMINES UR QL: NEGATIVE NG/ML
BARBITURATES UR QL: NEGATIVE NG/ML
BENZODIAZ UR QL: POSITIVE NG/ML
BZE UR QL: NEGATIVE NG/ML
CODEINE UR-MCNC: NEGATIVE NG/ML
CREAT UR-MCNC: 52.8 MG/DL
HYDROCODONE UR-MCNC: NEGATIVE NG/ML
HYDROMORPHONE UR-MCNC: NEGATIVE NG/ML
LORAZEPAM UR-MCNC: NEGATIVE NG/ML
METHADONE UR QL: NEGATIVE NG/ML
MORPHINE UR-MCNC: NEGATIVE NG/ML
NORDIAZEPAM UR-MCNC: 728 NG/ML
NORHYDROCODONE SAL CFM-MCNC: NEGATIVE NG/ML
NOROXYCODONE SAL CFM-MCNC: 1230 NG/ML
OPIATES UR QL: ABNORMAL NG/ML
OXAZEPAM UR-MCNC: 898 NG/ML
OXIDANTS UR QL: NEGATIVE MCG/ML
OXYCODONE UR QL: POSITIVE NG/ML
OXYCODONE UR-MCNC: 189 NG/ML
OXYMORPHONE UR-MCNC: 65 NG/ML
PCP UR QL: NEGATIVE NG/ML
PH UR: 6.79 [PH]
SL AMB AMINOCLONAZEPAM: NEGATIVE NG/ML
SL AMB HYDROXYETHYLFLURAZEPAM: NEGATIVE NG/ML
SL AMB MEDMATCH AMINOCLONAZEPAM: ABNORMAL
SL AMB MEDMATCH AMPTHETAMINES: ABNORMAL
SL AMB MEDMATCH AOH ALPRAZOLAM: ABNORMAL
SL AMB MEDMATCH AOH MIDAZOLAM: ABNORMAL
SL AMB MEDMATCH AOH TRIAZOLAM: ABNORMAL
SL AMB MEDMATCH BARBITUATES: ABNORMAL
SL AMB MEDMATCH COCAINE METABOLITE: ABNORMAL
SL AMB MEDMATCH CODEINE: ABNORMAL
SL AMB MEDMATCH HYDROCODONE: ABNORMAL
SL AMB MEDMATCH HYDROMORPHONE: ABNORMAL
SL AMB MEDMATCH LORAZEPAM: ABNORMAL
SL AMB MEDMATCH MARIJUANA METABOLITE: ABNORMAL
SL AMB MEDMATCH METHADONE METABOLITE: ABNORMAL
SL AMB MEDMATCH MORPHINE: ABNORMAL
SL AMB MEDMATCH NORDIAZEPAM: ABNORMAL
SL AMB MEDMATCH NORHYDROCODONE: ABNORMAL
SL AMB MEDMATCH NOROXYCODONE: ABNORMAL
SL AMB MEDMATCH OH, ET FLURAZEPAM: ABNORMAL
SL AMB MEDMATCH OXAZEPAM: ABNORMAL
SL AMB MEDMATCH OXYCODONE: ABNORMAL
SL AMB MEDMATCH OXYMORPHONE: ABNORMAL
SL AMB MEDMATCH PHENCYCLIDINE: ABNORMAL
SL AMB MEDMATCH TEMAZEPAM: ABNORMAL
TEMAZEPAM UR-MCNC: 1240 NG/ML
THC UR QL: NEGATIVE NG/ML

## 2018-05-21 RX ORDER — PANTOPRAZOLE SODIUM 40 MG/1
40 TABLET, DELAYED RELEASE ORAL 2 TIMES DAILY
Qty: 180 TABLET | Refills: 3 | Status: ON HOLD | OUTPATIENT
Start: 2018-05-21 | End: 2018-07-31

## 2018-05-21 NOTE — PROGRESS NOTES
I am not sure if you are Crystal collect that this 1 but again we need to put all of the medications that the patient is taking on the lab order form so that we can make sure that it is consistent with what the patient is taking

## 2018-05-25 ENCOUNTER — TELEPHONE (OUTPATIENT)
Dept: FAMILY MEDICINE CLINIC | Facility: CLINIC | Age: 63
End: 2018-05-25

## 2018-05-25 DIAGNOSIS — I10 BENIGN ESSENTIAL HTN: Primary | ICD-10-CM

## 2018-05-25 RX ORDER — HYDROCHLOROTHIAZIDE 12.5 MG/1
12.5 TABLET ORAL DAILY
Qty: 30 TABLET | Refills: 0 | Status: SHIPPED | OUTPATIENT
Start: 2018-05-25 | End: 2018-05-29 | Stop reason: SDUPTHER

## 2018-05-25 NOTE — TELEPHONE ENCOUNTER
I will add a small dose of diuretic onto her medication regimen to help with her blood pressure  It's still not controlled and she's still getting a lot of high readings  She is to continue to take blood pressure before medication and 1-2 hours after medication  She is to hold medication if BP is less than 110/60  Call next week with numbers or sooner if it's not helping  If the hydrochlorothiazide doesn't help with swelling or blood pressure it may be best for her to see cardiology or nephrology for input of BP management  If she has any shortness of breath or worsening swelling she should go to the ER       Thanks

## 2018-05-25 NOTE — TELEPHONE ENCOUNTER
Patient called and gave her BP readings for the past 7 days (twice a day)  She used 2 different machines so I will list them seperately    She is currently taking amlodpine 5mg and lisinopril 10mg    First Machine  05/18- 137/82 Pulse 62 (before meds)    05/19- 162/95 Pulse 71 (before meds)              151/85 Pulse 58 (after meds)    05/20- 156/93 Pulse 68 (after meds)    05/21- 175/92 Pulse 57 (before meds)              155/92 Pulse 68 (after meds)    05//97 Pulse 65 (before meds)             177/98 Pulse 63 (after meds)    05/23 167/90 Pulse 62 (before meds)             165/90 Pulse 54 (after meds)    05/24 180/97 Pulse 59 (before meds)             174/99 Pulse 56 (after meds)    Second Machine    05/19 173-105 Pulse 69     05/20 164/91 Pulse 64 (before meds)             126/91 Pulse 64 (after meds)    05/21 179/113 Pulse 74 (before meds)             164/94 Pulse 64 (after meds)    05/22 177/100 Pulse 65 (before meds)             170/91 Pulse 63 (after meds)    05/23 179/91 Pulse 70 (before meds)             16/108 Pulse 54 (after meds)    05/24 174/102 Pulse 69 (before meds)             178/107 Pulse 58 (after meds)    Also states that her legs are swelling up with water, bilateral

## 2018-05-27 DIAGNOSIS — K58.9 IRRITABLE BOWEL SYNDROME, UNSPECIFIED TYPE: Primary | ICD-10-CM

## 2018-05-28 DIAGNOSIS — I10 BENIGN ESSENTIAL HTN: ICD-10-CM

## 2018-05-29 DIAGNOSIS — I10 BENIGN ESSENTIAL HTN: ICD-10-CM

## 2018-05-29 RX ORDER — DICYCLOMINE HCL 20 MG
TABLET ORAL
Qty: 120 TABLET | Refills: 6 | Status: SHIPPED | OUTPATIENT
Start: 2018-05-29 | End: 2020-06-23

## 2018-05-29 RX ORDER — METOPROLOL SUCCINATE 50 MG/1
TABLET, EXTENDED RELEASE ORAL
Qty: 90 TABLET | Refills: 0 | Status: SHIPPED | OUTPATIENT
Start: 2018-05-29 | End: 2018-08-08

## 2018-05-29 RX ORDER — HYDROCHLOROTHIAZIDE 12.5 MG/1
12.5 TABLET ORAL DAILY
Qty: 90 TABLET | Refills: 0 | Status: SHIPPED | OUTPATIENT
Start: 2018-05-29 | End: 2018-06-04 | Stop reason: DRUGHIGH

## 2018-06-01 DIAGNOSIS — F32.A DEPRESSION, UNSPECIFIED DEPRESSION TYPE: ICD-10-CM

## 2018-06-01 DIAGNOSIS — G62.9 NEUROPATHY: Primary | ICD-10-CM

## 2018-06-01 DIAGNOSIS — M79.7 FIBROMYALGIA: ICD-10-CM

## 2018-06-01 RX ORDER — FLUOXETINE 10 MG/1
TABLET, FILM COATED ORAL
Qty: 90 TABLET | Refills: 3 | Status: SHIPPED | OUTPATIENT
Start: 2018-06-01 | End: 2018-07-12

## 2018-06-04 ENCOUNTER — OFFICE VISIT (OUTPATIENT)
Dept: FAMILY MEDICINE CLINIC | Facility: CLINIC | Age: 63
End: 2018-06-04
Payer: COMMERCIAL

## 2018-06-04 VITALS
HEIGHT: 65 IN | BODY MASS INDEX: 25.99 KG/M2 | SYSTOLIC BLOOD PRESSURE: 110 MMHG | DIASTOLIC BLOOD PRESSURE: 60 MMHG | WEIGHT: 156 LBS

## 2018-06-04 DIAGNOSIS — R13.19 ESOPHAGEAL DYSPHAGIA: ICD-10-CM

## 2018-06-04 DIAGNOSIS — R60.0 LOCALIZED EDEMA: ICD-10-CM

## 2018-06-04 DIAGNOSIS — G25.81 RESTLESS LEGS SYNDROME: ICD-10-CM

## 2018-06-04 DIAGNOSIS — K21.9 GASTROESOPHAGEAL REFLUX DISEASE WITHOUT ESOPHAGITIS: Primary | ICD-10-CM

## 2018-06-04 DIAGNOSIS — M79.7 FIBROMYALGIA: ICD-10-CM

## 2018-06-04 DIAGNOSIS — G89.4 CHRONIC PAIN DISORDER: ICD-10-CM

## 2018-06-04 DIAGNOSIS — M15.9 GENERALIZED OSTEOARTHRITIS: ICD-10-CM

## 2018-06-04 DIAGNOSIS — F41.9 ANXIETY: ICD-10-CM

## 2018-06-04 PROCEDURE — 99214 OFFICE O/P EST MOD 30 MIN: CPT | Performed by: FAMILY MEDICINE

## 2018-06-04 RX ORDER — DIAZEPAM 5 MG/1
5 TABLET ORAL 2 TIMES DAILY
Qty: 60 TABLET | Refills: 0 | Status: SHIPPED | OUTPATIENT
Start: 2018-06-04 | End: 2018-07-02 | Stop reason: SDUPTHER

## 2018-06-04 RX ORDER — TRIAMTERENE AND HYDROCHLOROTHIAZIDE 37.5; 25 MG/1; MG/1
1 CAPSULE ORAL EVERY MORNING
Qty: 30 CAPSULE | Refills: 1 | Status: SHIPPED | OUTPATIENT
Start: 2018-06-04 | End: 2018-06-04 | Stop reason: SDUPTHER

## 2018-06-04 RX ORDER — TRIAMTERENE AND HYDROCHLOROTHIAZIDE 37.5; 25 MG/1; MG/1
1 CAPSULE ORAL EVERY MORNING
Qty: 90 CAPSULE | Refills: 1 | Status: SHIPPED | OUTPATIENT
Start: 2018-06-04 | End: 2018-11-29 | Stop reason: SDUPTHER

## 2018-06-04 RX ORDER — METHOCARBAMOL 500 MG/1
500 TABLET, FILM COATED ORAL 3 TIMES DAILY
Qty: 270 TABLET | Refills: 0 | Status: SHIPPED | OUTPATIENT
Start: 2018-06-04 | End: 2018-08-31 | Stop reason: SDUPTHER

## 2018-06-04 RX ORDER — OXYCODONE HYDROCHLORIDE AND ACETAMINOPHEN 5; 325 MG/1; MG/1
1 TABLET ORAL EVERY 6 HOURS PRN
Qty: 120 TABLET | Refills: 0 | Status: SHIPPED | OUTPATIENT
Start: 2018-06-04 | End: 2018-07-02 | Stop reason: SDUPTHER

## 2018-06-04 RX ORDER — METHOCARBAMOL 500 MG/1
500 TABLET, FILM COATED ORAL 3 TIMES DAILY
COMMUNITY
End: 2018-06-04 | Stop reason: SDUPTHER

## 2018-06-04 RX ORDER — GABAPENTIN 600 MG/1
600 TABLET ORAL
Qty: 450 TABLET | Refills: 0 | Status: SHIPPED | OUTPATIENT
Start: 2018-06-04 | End: 2018-11-14 | Stop reason: SDUPTHER

## 2018-06-04 NOTE — PROGRESS NOTES
Assessment/Plan:     Diagnoses and all orders for this visit:    Gastroesophageal reflux disease without esophagitis  -     Ambulatory referral to Gastroenterology; Future    Generalized osteoarthritis  -     gabapentin (NEURONTIN) 600 MG tablet; Take 1 tablet (600 mg total) by mouth 5 (five) times a day  -     methocarbamol (ROBAXIN) 500 mg tablet; Take 1 tablet (500 mg total) by mouth 3 (three) times a day    Fibromyalgia  -     gabapentin (NEURONTIN) 600 MG tablet; Take 1 tablet (600 mg total) by mouth 5 (five) times a day    Restless legs syndrome  -     gabapentin (NEURONTIN) 600 MG tablet; Take 1 tablet (600 mg total) by mouth 5 (five) times a day    Anxiety  -     diazepam (VALIUM) 5 mg tablet; Take 1 tablet (5 mg total) by mouth 2 (two) times a day for 30 days    Chronic pain disorder  -     oxyCODONE-acetaminophen (PERCOCET) 5-325 mg per tablet; Take 1 tablet by mouth every 6 (six) hours as needed for moderate pain Max Daily Amount: 4 tablets    Esophageal dysphagia  -     Ambulatory referral to Gastroenterology; Future    Localized edema  -     Discontinue: triamterene-hydrochlorothiazide (DYAZIDE) 37 5-25 mg per capsule; Take 1 capsule by mouth every morning    Other orders  -     Discontinue: methocarbamol (ROBAXIN) 500 mg tablet; Take 500 mg by mouth 3 (three) times a day         patient is here for follow-up  Has some new issues with regards to esophageal dysphagia and despite the fact that she seen GI in the past   I believe an EGD would be necessary to repeat at this point  Chronic medications refilled  Patient has had a urine drug panel which is consistent with her medications that she is taking  Reviewed long-term use of scheduled medications  Patient unfortunately has chronic pain issues that will not resolve  She has seen Pain Management has had surgery and has had all modalities and unfortunately has failed those and requires chronic pain management with medication    [facility] monitoring web site was reviewed  No inconsistencies found   patient was evaluated for 25+ minutes with greater than 50% counseling provided  Formal follow-up in 3 months but review any new diagnostic studies available through GI  Subjective:   Chief Complaint   Patient presents with    Follow-up     Follow up of chronic medical problems  Patient ID: Thony Loco is a 61 y o  female  This is a unfortunate 68-year-old female with a myriad of Co morbid diagnoses including chronic back pain from failed surgery  Generalized osteoarthritis in multiple areas  She also has a history of longstanding GI difficulties stemming from status post gastric bypass surgery  She was very sick in January of 2017 after hospitalization at [facility] where she had a 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  She had extended recovery  She has not long-term GI issues with ear will bowel as well as  Possible wall small bowel bacterial overgrowth syndrome  Possible celiac  She has seen multiple GI physicians  She has had upper and lower endoscopy  She is having a lot of bloating recently  She is also having some new symptom of dysphagia mid esophagus and occasionally actually does regurgitate her food  She did not get a chance call for shoulder x-ray  It is about the same as when she was in the last visit  This is the same side that she fractured her low wrist last   October  She also recently is status post CVA  In middle cerebral artery tributary  She has no overt residual symptoms from that  Back Pain   This is a chronic problem  The current episode started more than 1 year ago  The problem occurs constantly  The problem has been waxing and waning since onset  The pain is present in the thoracic spine  The quality of the pain is described as burning and shooting  The pain is at a severity of 8/10  The pain is moderate  The symptoms are aggravated by bending, position and twisting  Pertinent negatives include no chest pain  She has tried analgesics and heat for the symptoms  The treatment provided mild relief  The following portions of the patient's history were reviewed and updated as appropriate: allergies, current medications, past family history, past medical history, past social history, past surgical history and problem list         Review of Systems   Constitutional: Positive for activity change (  Patient is limitedBy her total-body osteoarthritis chronic pain syndrome) and fatigue  HENT: Positive for rhinorrhea  Respiratory: Negative for cough  Cardiovascular: Negative for chest pain  Gastrointestinal: Positive for abdominal distention and diarrhea ( intermittent stool changes)  Nausea:  bloating  New symptom of difficulty swallowing and feeling like things are getting stuck mid esophagus   Genitourinary:        Urgency is chronic   Musculoskeletal: Positive for arthralgias, back pain and gait problem  Objective:  /60 (BP Location: Left arm, Cuff Size: Standard)   Ht 5' 4 5" (1 638 m)   Wt 70 8 kg (156 lb)   BMI 26 36 kg/m²        Physical Exam   Constitutional: She is oriented to person, place, and time  She appears well-developed and well-nourished  She appears distressed  80-year-old chronically ill-appearing patient with BMI of 26% who appears slightly older than stated age   Eyes: EOM are normal  Pupils are equal, round, and reactive to light  Neck:   Multiple trigger points of upper thoracic lower cervical rhomboid traps   Cardiovascular: Normal rate, regular rhythm and intact distal pulses  Pulmonary/Chest: Effort normal and breath sounds normal    Abdominal: Soft  There is tenderness ( diffuseMild tenderness mostly epigastric)  Musculoskeletal:   Gait is antalgic with forward flex kyphotic   Neurological: She is alert and oriented to person, place, and time  Psychiatric: She has a normal mood and affect   Her behavior is normal  Judgment and thought content normal

## 2018-06-08 DIAGNOSIS — M15.9 GENERALIZED OSTEOARTHRITIS: ICD-10-CM

## 2018-06-08 DIAGNOSIS — M79.7 FIBROMYALGIA: ICD-10-CM

## 2018-06-08 DIAGNOSIS — G25.81 RESTLESS LEGS SYNDROME: ICD-10-CM

## 2018-06-08 RX ORDER — GABAPENTIN 600 MG/1
TABLET ORAL
Qty: 450 TABLET | Refills: 0 | Status: SHIPPED | OUTPATIENT
Start: 2018-06-08 | End: 2020-10-26 | Stop reason: SDUPTHER

## 2018-06-13 ENCOUNTER — APPOINTMENT (OUTPATIENT)
Dept: RADIOLOGY | Facility: MEDICAL CENTER | Age: 63
End: 2018-06-13
Payer: COMMERCIAL

## 2018-06-13 ENCOUNTER — TRANSCRIBE ORDERS (OUTPATIENT)
Dept: ADMINISTRATIVE | Facility: HOSPITAL | Age: 63
End: 2018-06-13

## 2018-06-13 ENCOUNTER — HOSPITAL ENCOUNTER (OUTPATIENT)
Dept: MAMMOGRAPHY | Facility: MEDICAL CENTER | Age: 63
Discharge: HOME | End: 2018-06-13
Payer: COMMERCIAL

## 2018-06-13 DIAGNOSIS — M25.512 CHRONIC LEFT SHOULDER PAIN: ICD-10-CM

## 2018-06-13 DIAGNOSIS — Z12.39 SCREENING FOR MALIGNANT NEOPLASM OF BREAST: ICD-10-CM

## 2018-06-13 DIAGNOSIS — G89.29 CHRONIC LEFT SHOULDER PAIN: Primary | ICD-10-CM

## 2018-06-13 DIAGNOSIS — G89.29 CHRONIC LEFT SHOULDER PAIN: ICD-10-CM

## 2018-06-13 DIAGNOSIS — M25.512 CHRONIC LEFT SHOULDER PAIN: Primary | ICD-10-CM

## 2018-06-13 PROCEDURE — 73030 X-RAY EXAM OF SHOULDER: CPT

## 2018-06-13 PROCEDURE — 77067 SCR MAMMO BI INCL CAD: CPT

## 2018-06-14 DIAGNOSIS — G89.29 OTHER CHRONIC PAIN: ICD-10-CM

## 2018-06-14 RX ORDER — FENTANYL 50 UG/H
1 PATCH TRANSDERMAL
Qty: 10 PATCH | Refills: 0 | Status: SHIPPED | OUTPATIENT
Start: 2018-06-14 | End: 2018-07-13 | Stop reason: SDUPTHER

## 2018-06-22 DIAGNOSIS — R92.8 ABNORMALITY OF RIGHT BREAST ON SCREENING MAMMOGRAM: Primary | ICD-10-CM

## 2018-07-02 DIAGNOSIS — F41.9 ANXIETY: ICD-10-CM

## 2018-07-02 DIAGNOSIS — G89.4 CHRONIC PAIN DISORDER: ICD-10-CM

## 2018-07-02 RX ORDER — DIAZEPAM 5 MG/1
5 TABLET ORAL 2 TIMES DAILY
Qty: 60 TABLET | Refills: 0 | Status: SHIPPED | OUTPATIENT
Start: 2018-07-02 | End: 2018-07-31 | Stop reason: SDUPTHER

## 2018-07-02 RX ORDER — OXYCODONE HYDROCHLORIDE AND ACETAMINOPHEN 5; 325 MG/1; MG/1
1 TABLET ORAL EVERY 6 HOURS PRN
Qty: 120 TABLET | Refills: 0 | Status: SHIPPED | OUTPATIENT
Start: 2018-07-02 | End: 2018-07-30 | Stop reason: SDUPTHER

## 2018-07-05 ENCOUNTER — HOSPITAL ENCOUNTER (OUTPATIENT)
Dept: MAMMOGRAPHY | Facility: CLINIC | Age: 63
Discharge: HOME | End: 2018-07-05
Payer: COMMERCIAL

## 2018-07-05 ENCOUNTER — HOSPITAL ENCOUNTER (OUTPATIENT)
Dept: ULTRASOUND IMAGING | Facility: CLINIC | Age: 63
Discharge: HOME | End: 2018-07-05
Payer: COMMERCIAL

## 2018-07-05 DIAGNOSIS — R92.8 ABNORMAL MAMMOGRAM: ICD-10-CM

## 2018-07-05 DIAGNOSIS — R92.8 ABNORMALITY OF RIGHT BREAST ON SCREENING MAMMOGRAM: ICD-10-CM

## 2018-07-05 PROCEDURE — 77065 DX MAMMO INCL CAD UNI: CPT

## 2018-07-05 PROCEDURE — 76642 ULTRASOUND BREAST LIMITED: CPT

## 2018-07-05 PROCEDURE — G0279 TOMOSYNTHESIS, MAMMO: HCPCS

## 2018-07-12 ENCOUNTER — OFFICE VISIT (OUTPATIENT)
Dept: GASTROENTEROLOGY | Facility: MEDICAL CENTER | Age: 63
End: 2018-07-12
Payer: COMMERCIAL

## 2018-07-12 VITALS
TEMPERATURE: 96.5 F | WEIGHT: 153 LBS | HEIGHT: 65 IN | DIASTOLIC BLOOD PRESSURE: 70 MMHG | HEART RATE: 79 BPM | BODY MASS INDEX: 25.49 KG/M2 | SYSTOLIC BLOOD PRESSURE: 136 MMHG

## 2018-07-12 DIAGNOSIS — R14.0 BLOATING: Primary | ICD-10-CM

## 2018-07-12 DIAGNOSIS — Z90.49 S/P CHOLECYSTECTOMY: ICD-10-CM

## 2018-07-12 DIAGNOSIS — K21.9 GASTROESOPHAGEAL REFLUX DISEASE WITHOUT ESOPHAGITIS: ICD-10-CM

## 2018-07-12 DIAGNOSIS — R13.19 ESOPHAGEAL DYSPHAGIA: ICD-10-CM

## 2018-07-12 DIAGNOSIS — R19.7 DIARRHEA, UNSPECIFIED TYPE: ICD-10-CM

## 2018-07-12 DIAGNOSIS — Z98.84 HISTORY OF ROUX-EN-Y GASTRIC BYPASS: ICD-10-CM

## 2018-07-12 PROCEDURE — 99204 OFFICE O/P NEW MOD 45 MIN: CPT | Performed by: INTERNAL MEDICINE

## 2018-07-12 RX ORDER — HYDROCHLOROTHIAZIDE 12.5 MG/1
12.5 TABLET ORAL DAILY
COMMUNITY

## 2018-07-12 NOTE — LETTER
July 15, 0327     Dmitry Fabian DO  [address] [address] [facility]     Patient: Osmar Bhakta   YOB: 1955   Date of Visit: 7/12/2018       Dear Dr Addison Richmond: Thank you for referring Zack Diane to me for evaluation  Below are my notes for this consultation  If you have questions, please do not hesitate to call me  I look forward to following your patient along with you  Sincerely,      BARRY Shepherd  [facility]  Mobile: [phone]  Available on [employer]  [email or web address]           CC: No Recipients  Rufino Reyna MD  7/15/2018  6:09 PM  Sign at close encounter  [facility] - Outpatient Consultation  Osmar Bhakta 61 y o  female MRN: [id number]  Encounter: [id number]      PCP: Dmitry Fabian DO  Referring: Dmitry Fabian DO  [address] [facility], [address] [place]      ASSESSMENT AND PLAN:      1  Gastroesophageal reflux disease without esophagitis  2  Esophageal dysphagia  Dysphagia and long standing GERD  Will plan for EGD to rule out anastomotic stricture given history of RYGB and erosive disease  She should continue PPI, Bentyl and carafate for now  - Ambulatory referral to Gastroenterology  - Case request operating room: ESOPHAGOGASTRODUODENOSCOPY (EGD); Standing  - Case request operating room: ESOPHAGOGASTRODUODENOSCOPY (EGD)    3  Bloating  4  Diarrhea, unspecified type  5  S/P cholecystectomy  Symptoms of bloating likely functional and related to dietary intake of high FODMAP foods  These symptoms may also be related to her diarrhea, or possibly post-cholecysetctomy syndrome v SIBO  Evaluate for inflammatory disorders, infectious sources, celiac, as below  Suggest she avoid her dietary triggers, especially fried/fatty foods  She may benefit from trial of colestipol vs rifaximin  She should d/c all laxatives  - C-reactive protein;  Future  - Sedimentation rate, automated; Future  - Vitamin B12; Future  - Folate; Future  - Clostridium difficile toxin by PCR; Future  - Calprotectin,Fecal; Future  - Pancreatic elastase, fecal; Future  - Tissue transglutaminase, IgA; Future  - IgA; Future  - Stool Enteric Bacterial Panel by PCR; Future    6  History of Nerissa-en-Y gastric bypass        ______________________________________________________________________    HPI:      Chief Complaint   Patient presents with    Abdominal Pain     Patient states this is worsening     Diarrhea     Chronic     Nausea     Constant     Vomiting    Dysphagia     Becoming more frequent     Heartburn    Fatigue    Bloated     Patient states this is severe        Patient is a 51-year-old female referred to me for multiple abdominal complaints  She has a past medical history of hypertension, CVA, osteoarthritis, spinal fusion  Her surgical history is extensive including History of Nerissa-en-Y gastric bypass, complicated by recurrent choledocholithiasis requiring lap assisted ERCP, remote history of appendectomy, cholecystectomy, complicated by incisional hernia, status post repair and mesh placement  She has a history of a spinal fusion, requiring spinal stimulator, which was then removed but the wires persists in her spine precluding MRCP evaluation  Her primary complaint is abdominal disfiguration and abdominal bloating  This has been resultant since her multiple abdominal surgeries  She complains of worsening bloating over the past one year, however symptoms have been going on since 2013 and her extensive surgical history  Her symptoms are worse when she needs to have a bowel movement  She complains of irregular bowel habits  At baseline she has five bowel movements daily, this is been with addition of a half cap full of MiraLax  She reports taking the MiraLax because if she does not she becomes constipated due to extensive narcotic pain treatment    If she does not take the MiraLax, she has no bowel movements  She takes the MiraLax she has five bowel movements per day  She has not attempted to not take the MiraLax or titrate down to see if this will treat her diarrhea  She complains of frequent nausea and vomiting associated with acidic regurgitation radiating up her nose  This frequently occurs after meals, most specifically with intake of hot dogs and cheeseburgers  She complains of early satiety associated with the symptoms she also complains of dysphagia primarily to pills  She has been started on Protonix 40 mg, Carafate t i d  a c , Bentyl 4 times daily  None of these medications are helping with her symptoms  REVIEW OF SYSTEMS:    CONSTITUTIONAL: Denies any fever, chills, rigors, and weight loss  HEENT: No earache or tinnitus  Denies hearing loss or visual disturbances  CARDIOVASCULAR: No chest pain or palpitations  RESPIRATORY: Denies any cough, hemoptysis, shortness of breath or dyspnea on exertion  GASTROINTESTINAL: As noted in the History of Present Illness  GENITOURINARY: No problems with urination  Denies any hematuria or dysuria  NEUROLOGIC: No dizziness or vertigo, denies headaches  MUSCULOSKELETAL: Denies any muscle or joint pain  SKIN: Denies skin rashes or itching  ENDOCRINE: Denies excessive thirst  Denies intolerance to heat or cold  PSYCHOSOCIAL: Denies depression or anxiety  Denies any recent memory loss         Historical Information   Past Medical History:   Diagnosis Date    Anemia     last assessed 04Dec2014    Arthritis     Bowel incontinence     last assessed 24Sep2013    Celiac disease     [facility] dependence [facility] [address] [facility]     Chronic pain disorder     Depression     Diabetes mellitus [facility] [address] [facility]     Disease of thyroid gland     Elevated liver enzymes     history of    Fibromyalgia     Galactorrhea     with normal prolactins    Gastritis     last assessed 22Apr2013    GERD (gastroesophageal reflux disease)     Hyperlipidemia last assessed [id number]    Hypertension     Hypoglycemia     last assessed [id number]    Left wrist fracture     2017    Lymphadenopathy     right submandibular;  last assessed [id number]    Malabsorption syndrome     Malabsorption syndrome     last assessed [id number]    Mal[facility] neoplasm [facility] [address]     Nicotine dependence     Obesity     last assessed [id number]    Osteopenia     Right sided weakness     RLS (restless legs syndrome)     Sepsis ([facility] [address] [facility]     dec 2016    Thyroid disease     last assessed [id number]     Past Surgical History:   Procedure Laterality Date    APPENDECTOMY      BACK SURGERY      BARIATRIC SURGERY       SECTION      CHOLECYSTECTOMY      ERCP      GASTRIC BYPASS      patient s/p Jigar en y     [address]      three times    HYSTERECTOMY      INCISIONAL HERNIA REPAIR Right     gallbladder scar;  last assessed [id number]    KNEE ARTHROSCOPY Bilateral     OR REVISE/REMOVE SPINAL NEUROSTIM/ Left 2017    Procedure: REMOVAL OF A LEFT BUTTOCK SCS IPG;  Surgeon: Eric Bailey MD;  [facility];  Service: Neurosurgery    OR SURG IM[address]NT [address] N/A 8/3/2016    Procedure: PLACEMENT THORACIC SPINAL CORD STIMULATOR WITH LEFT BUTTOCK IMPLANTABLE PULSE GENERATOR;  Surgeon: Eric Bailey MD;  [facility];  Service: Neurosurgery    JIGAR-EN-Y PROCEDURE      TUBAL LIGATION      WRIST SURGERY       Social History   History   Alcohol Use No     Comment: active social alcohol use, per [employer]     History   Drug Use No     History   Smoking Status    Current Every Day Smoker    Packs/day: 0 75   Smokeless Tobacco    Never Used     Comment: encouraged pt to quit,tried many things     Family History   Problem Relation Age of Onset    Diabetes Mother     Hypertension Mother    Paulino Washington Retinal degeneration Mother         secondary to DM complications    Kidney disease Father     Lung cancer Father     Brain cancer Father  Kidney failure Father     Heart disease Family     Diabetes Family     Hypertension Family     Heart attack Family     Hyperlipidemia Family        Meds/Allergies       Current Outpatient Prescriptions:     amLODIPine (NORVASC) 2 5 mg tablet    aspirin 81 mg chewable tablet    atorvastatin (LIPITOR) 80 mg tablet    B Complex-Folic Acid (SUPER B COMPLEX MAXI) TABS    buPROPion (WELLBUTRIN SR) 150 mg 12 hr tablet    diazepam (VALIUM) 5 mg tablet    dicyclomine (BENTYL) 20 mg tablet    fentaNYL (DURAGESIC) 50 mcg/hr    gabapentin (NEURONTIN) 600 MG tablet    gabapentin (NEURONTIN) 600 MG tablet    hydrochlorothiazide (HYDRODIURIL) 12 5 mg tablet    levothyroxine 25 mcg tablet    lisinopril (ZESTRIL) 10 mg tablet    methocarbamol (ROBAXIN) 500 mg tablet    metoprolol succinate (TOPROL-XL) 25 mg 24 hr tablet    metoprolol succinate (TOPROL-XL) 50 mg 24 hr tablet    Multiple Vitamins-Minerals (HAIR/SKIN/NAILS/BIOTIN) TABS    oxybutynin (DITROPAN) 5 mg tablet    oxyCODONE-acetaminophen (PERCOCET) 5-325 mg per tablet    pantoprazole (PROTONIX) 40 mg tablet    sucralfate (CARAFATE) 1 g tablet    triamterene-hydrochlorothiazide (DYAZIDE) 37 5-25 mg per capsule    Allergies   Allergen Reactions    Codeine GI Intolerance    Morphine GI Intolerance    Cortisone      Other reaction(s): Fever    Hydrocortisone Fever    Other     Penicillins      Other reaction(s): Rash           Objective     Blood pressure 136/70, pulse 79, temperature (!) 96 5 °F (35 8 °C), temperature source Tympanic, height 5' 4 5" (1 638 m), weight 69 4 kg (153 lb)  Body mass index is 25 86 kg/m²       PHYSICAL EXAM:      General Appearance:   Alert, cooperative, no distress   HEENT:   Normocephalic, atraumatic, anicteric      Neck:  Supple, symmetrical, trachea midline   Lungs:   Clear to auscultation bilaterally; no rales, rhonchi or wheezing; respirations unlabored    Heart[de-identified]   Regular rate and rhythm; no murmur, rub, or gallop     Abdomen:   Soft, non-distended; normal bowel sounds; no masses, no organomegaly; + multiple abdominal scars with palpated scar tissue; + generalized abdominal pain, mild   Genitalia:   Deferred    Rectal:   Deferred    Extremities:  No cyanosis, clubbing or edema    Pulses:  2+ and symmetric    Skin:  No jaundice, rashes, or lesions    Lymph nodes:  No palpable cervical lymphadenopathy        Lab Results:     Lab Results   Component Value Date    WBC 5 45 04/03/2018    HGB 12 0 04/03/2018    HCT 35 3 04/03/2018    MCV 91 04/03/2018     04/03/2018       Lab Results   Component Value Date     04/03/2018    K 3 9 04/03/2018     04/03/2018    CO2 26 04/03/2018    ANIONGAP 10 04/03/2018    BUN 7 04/03/2018    CREATININE 0 63 04/03/2018    GLUCOSE 92 04/03/2018    GLUF 79 06/15/2017    CALCIUM 8 4 04/03/2018    AST 29 11/13/2015    ALT 94 (H) 11/13/2015    ALKPHOS 540 (H) 11/13/2015    PROT 7 1 11/13/2015    BILITOT 0 27 11/13/2015    EGFR 96 04/03/2018       Lab Results   Component Value Date    INR 0 86 04/02/2018    INR 0 93 06/15/2017    INR 0 90 11/13/2015    PROTIME 11 7 (L) 04/02/2018    PROTIME 12 5 06/15/2017    PROTIME 12 2 11/13/2015         Radiology Results:   None relevant

## 2018-07-12 NOTE — PROGRESS NOTES
[facility] - Outpatient Consultation  Bubba Dolan 61 y o  female MRN: [id number]  Encounter: [id number]      PCP: Arminda Todd DO  Referring: Arminda Todd DO  [address] [facility], [address] [place]      ASSESSMENT AND PLAN:      1  Gastroesophageal reflux disease without esophagitis  2  Esophageal dysphagia  Dysphagia and long standing GERD  Will plan for EGD to rule out anastomotic stricture given history of RYGB and erosive disease  She should continue PPI, Bentyl and carafate for now  - Ambulatory referral to Gastroenterology  - Case request operating room: ESOPHAGOGASTRODUODENOSCOPY (EGD); Standing  - Case request operating room: ESOPHAGOGASTRODUODENOSCOPY (EGD)    3  Bloating  4  Diarrhea, unspecified type  5  S/P cholecystectomy  Symptoms of bloating likely functional and related to dietary intake of high FODMAP foods  These symptoms may also be related to her diarrhea, or possibly post-cholecysetctomy syndrome v SIBO  Evaluate for inflammatory disorders, infectious sources, celiac, as below  Suggest she avoid her dietary triggers, especially fried/fatty foods  She may benefit from trial of colestipol vs rifaximin  She should d/c all laxatives  - C-reactive protein; Future  - Sedimentation rate, automated; Future  - Vitamin B12; Future  - Folate; Future  - Clostridium difficile toxin by PCR; Future  - Calprotectin,Fecal; Future  - Pancreatic elastase, fecal; Future  - Tissue transglutaminase, IgA; Future  - IgA; Future  - Stool Enteric Bacterial Panel by PCR; Future    6  History of Nerissa-en-Y gastric bypass        ______________________________________________________________________    HPI:      Patient is a 78-year-old female referred to me for multiple abdominal complaints  She has a past medical history of hypertension, CVA, osteoarthritis, spinal fusion     Her surgical history is extensive including History of Nerissa-en-Y gastric bypass, complicated by recurrent choledocholithiasis requiring lap assisted ERCP, remote history of appendectomy, cholecystectomy, complicated by incisional hernia, status post repair and mesh placement  She has a history of a spinal fusion, requiring spinal stimulator, which was then removed but the wires persists in her spine precluding MRCP evaluation  Her primary complaint is abdominal disfiguration and abdominal bloating  This has been resultant since her multiple abdominal surgeries  She complains of worsening bloating over the past one year, however symptoms have been going on since 2013 and her extensive surgical history  Her symptoms are worse when she needs to have a bowel movement  She complains of irregular bowel habits  At baseline she has five bowel movements daily, this is been with addition of a half cap full of MiraLax  She reports taking the MiraLax because if she does not she becomes constipated due to extensive narcotic pain treatment  If she does not take the MiraLax, she has no bowel movements  She takes the MiraLax she has five bowel movements per day  She has not attempted to not take the MiraLax or titrate down to see if this will treat her diarrhea  She complains of frequent nausea and vomiting associated with acidic regurgitation radiating up her nose  This frequently occurs after meals, most specifically with intake of hot dogs and cheeseburgers  She complains of early satiety associated with the symptoms she also complains of dysphagia primarily to pills  She has been started on Protonix 40 mg, Carafate t i d  a c , Bentyl 4 times daily  None of these medications are helping with her symptoms  She does note her diarrhea frequency is improved with addition of oxybutynin TID  REVIEW OF SYSTEMS:    CONSTITUTIONAL: Denies any fever, chills, rigors, and weight loss  HEENT: No earache or tinnitus  Denies hearing loss or visual disturbances    CARDIOVASCULAR: No chest pain or palpitations  RESPIRATORY: Denies any cough, hemoptysis, shortness of breath or dyspnea on exertion  GASTROINTESTINAL: As noted in the History of Present Illness  GENITOURINARY: No problems with urination  Denies any hematuria or dysuria  NEUROLOGIC: No dizziness or vertigo, denies headaches  MUSCULOSKELETAL: Denies any muscle or joint pain  SKIN: Denies skin rashes or itching  ENDOCRINE: Denies excessive thirst  Denies intolerance to heat or cold  PSYCHOSOCIAL: Denies depression or anxiety  Denies any recent memory loss         Historical Information   Past Medical History:   Diagnosis Date    Anemia     last assessed [id number]    Arthritis     Bowel incontinence     last assessed [id number]    Celiac disease     [facility]     Chronic pain disorder     Depression     Diabetes mellitus [facility]     Disease of thyroid gland     Elevated liver enzymes     history of    Fibromyalgia     Galactorrhea     with normal prolactins    Gastritis     last assessed [id number]    GERD (gastroesophageal reflux disease)     Hyperlipidemia     last assessed [id number]    Hypertension     Hypoglycemia     last assessed [id number]    Left wrist fracture     2017    Lymphadenopathy     right submandibular;  last assessed [id number]    Malabsorption syndrome     Malabsorption syndrome     last assessed [id number]    [facility]     Nicotine dependence     Obesity     last assessed [id number]    Osteopenia     Right sided weakness     RLS (restless legs syndrome)     Sepsis ([facility] )     dec 2016    Thyroid disease     last assessed [id number]     Past Surgical History:   Procedure Laterality Date    APPENDECTOMY      BACK SURGERY      BARIATRIC SURGERY       SECTION      CHOLECYSTECTOMY      ERCP      GASTRIC BYPASS      patient s/p Nerissa en y     HERNIA REPAIR      three times    HYSTERECTOMY      INCISIONAL HERNIA REPAIR Right gallbladder scar;  last assessed [id number]    KNEE ARTHROSCOPY Bilateral     FL REVISE/REMOVE SPINAL NEUROSTIM/ Left 7/14/2017    Procedure: REMOVAL OF A LEFT BUTTOCK SCS IPG;  Surgeon: Rodo Dangelo MD;  [facility];  Service: Neurosurgery    FL SURG IMPLNT [facility] N/A 8/3/2016    Procedure: PLACEMENT THORACIC SPINAL CORD STIMULATOR WITH LEFT BUTTOCK IMPLANTABLE PULSE GENERATOR;  Surgeon: Rodo Dangelo MD;  [facility];  Service: Neurosurgery    JIGAR-EN-Y PROCEDURE      TUBAL LIGATION      WRIST SURGERY       Social History   History   Alcohol Use No     Comment: active social alcohol use, per [employer]     History   Drug Use No     History   Smoking Status    Current Every Day Smoker    Packs/day: 0 75   Smokeless Tobacco    Never Used     Comment: encouraged pt to quit,tried many things     Family History   Problem Relation Age of Onset    Diabetes Mother     Hypertension Mother    Learta January Retinal degeneration Mother         secondary to DM complications    Kidney disease Father     Lung cancer Father     Brain cancer Father     Kidney failure Father     Heart disease Family     Diabetes Family     Hypertension Family     Heart attack Family     Hyperlipidemia Family        Meds/Allergies       Current Outpatient Prescriptions:     amLODIPine (NORVASC) 2 5 mg tablet    aspirin 81 mg chewable tablet    atorvastatin (LIPITOR) 80 mg tablet    B Complex-Folic Acid (SUPER B COMPLEX MAXI) TABS    buPROPion (WELLBUTRIN SR) 150 mg 12 hr tablet    diazepam (VALIUM) 5 mg tablet    dicyclomine (BENTYL) 20 mg tablet    fentaNYL (DURAGESIC) 50 mcg/hr    gabapentin (NEURONTIN) 600 MG tablet    gabapentin (NEURONTIN) 600 MG tablet    hydrochlorothiazide (HYDRODIURIL) 12 5 mg tablet    levothyroxine 25 mcg tablet    lisinopril (ZESTRIL) 10 mg tablet    methocarbamol (ROBAXIN) 500 mg tablet    metoprolol succinate (TOPROL-XL) 25 mg 24 hr tablet   metoprolol succinate (TOPROL-XL) 50 mg 24 hr tablet    Multiple Vitamins-Minerals (HAIR/SKIN/NAILS/BIOTIN) TABS    oxybutynin (DITROPAN) 5 mg tablet    oxyCODONE-acetaminophen (PERCOCET) 5-325 mg per tablet    pantoprazole (PROTONIX) 40 mg tablet    sucralfate (CARAFATE) 1 g tablet    triamterene-hydrochlorothiazide (DYAZIDE) 37 5-25 mg per capsule    Allergies   Allergen Reactions    Codeine GI Intolerance    Morphine GI Intolerance    Cortisone      Other reaction(s): Fever    Hydrocortisone Fever    Other     Penicillins      Other reaction(s): Rash           Objective     Blood pressure 136/70, pulse 79, temperature (!) 96 5 °F (35 8 °C), temperature source Tympanic, height 5' 4 5" (1 638 m), weight 69 4 kg (153 lb)  Body mass index is 25 86 kg/m²  PHYSICAL EXAM:      General Appearance:   Alert, cooperative, no distress   HEENT:   Normocephalic, atraumatic, anicteric      Neck:  Supple, symmetrical, trachea midline   Lungs:   Clear to auscultation bilaterally; no rales, rhonchi or wheezing; respirations unlabored    Heart[de-identified]   Regular rate and rhythm; no murmur, rub, or gallop     Abdomen:   Soft, non-distended; normal bowel sounds; no masses, no organomegaly; + multiple abdominal scars with palpated scar tissue; + generalized abdominal pain, mild   Genitalia:   Deferred    Rectal:   Deferred    Extremities:  No cyanosis, clubbing or edema    Pulses:  2+ and symmetric    Skin:  No jaundice, rashes, or lesions    Lymph nodes:  No palpable cervical lymphadenopathy        Lab Results:     Lab Results   Component Value Date    WBC 5 45 04/03/2018    HGB 12 0 04/03/2018    HCT 35 3 04/03/2018    MCV 91 04/03/2018     04/03/2018       Lab Results   Component Value Date     04/03/2018    K 3 9 04/03/2018     04/03/2018    CO2 26 04/03/2018    ANIONGAP 10 04/03/2018    BUN 7 04/03/2018    CREATININE 0 63 04/03/2018    GLUCOSE 92 04/03/2018    GLUF 79 06/15/2017    CALCIUM 8 4 04/03/2018 AST 29 11/13/2015    ALT 94 (H) 11/13/2015    ALKPHOS 540 (H) 11/13/2015    PROT 7 1 11/13/2015    BILITOT 0 27 11/13/2015    EGFR 96 04/03/2018       Lab Results   Component Value Date    INR 0 86 04/02/2018    INR 0 93 06/15/2017    INR 0 90 11/13/2015    PROTIME 11 7 (L) 04/02/2018    PROTIME 12 5 06/15/2017    PROTIME 12 2 11/13/2015         Radiology Results:   None relevant

## 2018-07-13 DIAGNOSIS — G89.29 OTHER CHRONIC PAIN: ICD-10-CM

## 2018-07-13 RX ORDER — FENTANYL 50 UG/H
1 PATCH TRANSDERMAL
Qty: 10 PATCH | Refills: 0 | Status: SHIPPED | OUTPATIENT
Start: 2018-07-13 | End: 2018-08-14 | Stop reason: SDUPTHER

## 2018-07-28 DIAGNOSIS — I10 ESSENTIAL HYPERTENSION: ICD-10-CM

## 2018-07-30 ENCOUNTER — ANESTHESIA EVENT (OUTPATIENT)
Dept: GASTROENTEROLOGY | Facility: MEDICAL CENTER | Age: 63
End: 2018-07-30
Payer: COMMERCIAL

## 2018-07-30 DIAGNOSIS — I63.9 CEREBROVASCULAR ACCIDENT (CVA), UNSPECIFIED MECHANISM (HCC): ICD-10-CM

## 2018-07-30 DIAGNOSIS — R53.1 RIGHT SIDED WEAKNESS: ICD-10-CM

## 2018-07-30 DIAGNOSIS — G89.4 CHRONIC PAIN DISORDER: ICD-10-CM

## 2018-07-30 RX ORDER — ATORVASTATIN CALCIUM 80 MG/1
TABLET, FILM COATED ORAL
Qty: 90 TABLET | Refills: 3 | Status: SHIPPED | OUTPATIENT
Start: 2018-07-30 | End: 2019-08-07 | Stop reason: SDUPTHER

## 2018-07-30 RX ORDER — AMLODIPINE BESYLATE 2.5 MG/1
TABLET ORAL
Qty: 90 TABLET | Refills: 0 | Status: SHIPPED | OUTPATIENT
Start: 2018-07-30 | End: 2018-10-26 | Stop reason: SDUPTHER

## 2018-07-30 RX ORDER — OXYCODONE HYDROCHLORIDE AND ACETAMINOPHEN 5; 325 MG/1; MG/1
1 TABLET ORAL EVERY 6 HOURS PRN
Qty: 120 TABLET | Refills: 0 | Status: SHIPPED | OUTPATIENT
Start: 2018-07-30 | End: 2018-08-29 | Stop reason: SDUPTHER

## 2018-07-31 ENCOUNTER — HOSPITAL ENCOUNTER (OUTPATIENT)
Facility: MEDICAL CENTER | Age: 63
Setting detail: OUTPATIENT SURGERY
Discharge: HOME | End: 2018-07-31
Attending: INTERNAL MEDICINE | Admitting: INTERNAL MEDICINE
Payer: COMMERCIAL

## 2018-07-31 ENCOUNTER — ANESTHESIA (OUTPATIENT)
Dept: GASTROENTEROLOGY | Facility: MEDICAL CENTER | Age: 63
End: 2018-07-31
Payer: COMMERCIAL

## 2018-07-31 VITALS
HEIGHT: 65 IN | DIASTOLIC BLOOD PRESSURE: 55 MMHG | SYSTOLIC BLOOD PRESSURE: 117 MMHG | BODY MASS INDEX: 25.49 KG/M2 | TEMPERATURE: 98.6 F | HEART RATE: 54 BPM | RESPIRATION RATE: 14 BRPM | OXYGEN SATURATION: 94 % | WEIGHT: 153 LBS

## 2018-07-31 DIAGNOSIS — R13.19 ESOPHAGEAL DYSPHAGIA: ICD-10-CM

## 2018-07-31 DIAGNOSIS — K21.9 GASTROESOPHAGEAL REFLUX DISEASE WITHOUT ESOPHAGITIS: ICD-10-CM

## 2018-07-31 DIAGNOSIS — K58.0 IRRITABLE BOWEL SYNDROME WITH DIARRHEA: Primary | ICD-10-CM

## 2018-07-31 DIAGNOSIS — F41.9 ANXIETY: ICD-10-CM

## 2018-07-31 PROCEDURE — 88305 TISSUE EXAM BY PATHOLOGIST: CPT | Performed by: PATHOLOGY

## 2018-07-31 PROCEDURE — 88342 IMHCHEM/IMCYTCHM 1ST ANTB: CPT | Performed by: PATHOLOGY

## 2018-07-31 PROCEDURE — 43239 EGD BIOPSY SINGLE/MULTIPLE: CPT | Performed by: INTERNAL MEDICINE

## 2018-07-31 RX ORDER — SODIUM CHLORIDE 9 MG/ML
125 INJECTION, SOLUTION INTRAVENOUS CONTINUOUS
Status: DISCONTINUED | OUTPATIENT
Start: 2018-07-31 | End: 2018-07-31 | Stop reason: HOSPADM

## 2018-07-31 RX ORDER — LIDOCAINE HYDROCHLORIDE 20 MG/ML
INJECTION, SOLUTION EPIDURAL; INFILTRATION; INTRACAUDAL; PERINEURAL AS NEEDED
Status: DISCONTINUED | OUTPATIENT
Start: 2018-07-31 | End: 2018-07-31 | Stop reason: SURG

## 2018-07-31 RX ORDER — PROPOFOL 10 MG/ML
INJECTION, EMULSION INTRAVENOUS AS NEEDED
Status: DISCONTINUED | OUTPATIENT
Start: 2018-07-31 | End: 2018-07-31 | Stop reason: SURG

## 2018-07-31 RX ORDER — DIAZEPAM 5 MG/1
TABLET ORAL
Qty: 60 TABLET | Refills: 0 | Status: SHIPPED | OUTPATIENT
Start: 2018-07-31 | End: 2018-08-29 | Stop reason: SDUPTHER

## 2018-07-31 RX ADMIN — PROPOFOL 50 MG: 10 INJECTION, EMULSION INTRAVENOUS at 10:11

## 2018-07-31 RX ADMIN — PROPOFOL 50 MG: 10 INJECTION, EMULSION INTRAVENOUS at 10:09

## 2018-07-31 RX ADMIN — LIDOCAINE HYDROCHLORIDE 5 ML: 20 INJECTION, SOLUTION EPIDURAL; INFILTRATION; INTRACAUDAL; PERINEURAL at 10:07

## 2018-07-31 RX ADMIN — SODIUM CHLORIDE 125 ML/HR: 0.9 INJECTION, SOLUTION INTRAVENOUS at 09:47

## 2018-07-31 RX ADMIN — PROPOFOL 100 MG: 10 INJECTION, EMULSION INTRAVENOUS at 10:08

## 2018-07-31 NOTE — OP NOTE
**** GI/ENDOSCOPY REPORT ****     PATIENT NAME: YANELIS OLVERA - VISIT ID:  Patient ID: [id number]   YOB: 1955     INTRODUCTION: Esophagogastroduodenoscopy - A 61 female patient presents   for an outpatient Esophagogastroduodenoscopy at [address] [place] [address]  INDICATIONS: Abdominal pain  Bloating  Change in bowel habits  Dysphagia  GERD  CONSENT: The benefits, risks, and alternatives to the procedure were   discussed and informed consent was obtained from the patient  PREPARATION:  EKG, pulse, pulse oximetry and blood pressure were monitored   throughout the procedure  ASA Classification: Class 3 - Patient has severe   systemic disturbance that may or may not be related to the disorder   requiring surgery  MEDICATIONS: MAC anesthesia  PROCEDURE:  The endoscope was passed without difficulty through the mouth   under direct visualization and advanced to the 2nd portion of the   duodenum  The scope was withdrawn and the mucosa was carefully examined  FINDINGS:   Esophagus: The esophagus appeared to be normal  The GE   junction was observed 35 cm from the entry point  The z-line was   visualized  The squamocolumnar junction was visualized  Stomach: The body   of the stomach appeared to be normal, with small gastric pouch noted  A   biopsy was taken  There was evidence of prior gastric pouch, prior   gastrojejunostomy, and a prior anastomosis in the stomachconsistent with   patient's history of ly-en-Y gastric bypass  Jejunum: The   post-anastomotic jejunum appeared to be normal  A biopsy was taken  COMPLICATIONS: There were no complications  IMPRESSIONS: Normal esophagus  GE junction visualized  The z-line was   visualized  The squamocolumnar junction was visualized  Normal body of the   stomach  Biopsy taken  Evidence of prior intervention in the stomach  Normal jejunum  Biopsy taken       RECOMMENDATIONS: Anti-reflux measures: Raise the head of the bed 4 to 6   inches  Avoid smoking  Avoid excess coffee, tea or other caffeinated   beverages  Avoid garments that fit tightly through the abdomen  Avoid   eating before bed  Resume regular diet as tolerated  Continue current   medications  Follow-up on the results of the biopsy specimens  Obtain   stool studies, lab studies as previously ordered  Prior surgical   interventions (small gastric pouch) contributing to symptoms)  ESTIMATED BLOOD LOSS:     PATHOLOGY SPECIMENS: Random biopsy taken  Random biopsy taken  PROCEDURE CODES:     ICD-9 Codes: 789 00 Abdominal pain, unspecified site 787  3 Flatulence,   eructation, and gas pain 787 99 Other symptoms involving digestive system   787 2 DYSPHAGIA 530 81 Esophageal reflux     ICD-10 Codes: R10 Abdominal and pelvic pain R14 0 Abdominal distension   (gaseous) R19 4 Change in bowel habit R13 10 Dysphagia, unspecified K21   Gastro-esophageal reflux disease     PERFORMED BY: BARRY Chaparro  on 07/31/2018  Version 1, electronically signed by BARRY Chaparro  on 07/31/2018   at 10:23

## 2018-07-31 NOTE — ANESTHESIA PREPROCEDURE EVALUATION
Review of Systems/Medical History  Patient summary reviewed        Cardiovascular  Negative cardio ROS Hyperlipidemia, Hypertension ,    Pulmonary  Negative pulmonary ROS Smoker cigarette smoker  , Tobacco cessation counseling given Cumulative Pack Years: 27,        GI/Hepatic  Negative GI/hepatic ROS   GERD well controlled,        Negative  ROS        Endo/Other  Negative endo/other ROS Diabetes well controlled type 2 Oral agent, History of thyroid disease ,      GYN  Negative gynecology ROS          Hematology  Negative hematology ROS Anemia ,     Musculoskeletal  Negative musculoskeletal ROS   Arthritis     Neurology  Negative neurology ROS   CVA ,    Psychology   Negative psychology ROS Anxiety, Depression ,              Physical Exam    Airway    Mallampati score: II  TM Distance: >3 FB  Neck ROM: full     Dental   No notable dental hx     Cardiovascular  Comment: Negative ROS, Rhythm: regular, Rate: normal, Cardiovascular exam normal    Pulmonary  Pulmonary exam normal Breath sounds clear to auscultation,     Other Findings        Anesthesia Plan  ASA Score- 3     Anesthesia Type- IV sedation with anesthesia with ASA Monitors  Additional Monitors:   Airway Plan:         Plan Factors-    Induction- intravenous  Postoperative Plan-     Informed Consent- Anesthetic plan and risks discussed with patient

## 2018-07-31 NOTE — H&P (VIEW-ONLY)
[facility] - Outpatient Consultation  Faustina Aly 61 y o  female MRN: [id number]  Encounter: [id number]      PCP: Ottoniel Acuña DO  Referring: Ottoniel Acuña DO  [address] [facility], [address] [place]      ASSESSMENT AND PLAN:      1  Gastroesophageal reflux disease without esophagitis  2  Esophageal dysphagia  Dysphagia and long standing GERD  Will plan for EGD to rule out anastomotic stricture given history of RYGB and erosive disease  She should continue PPI, Bentyl and carafate for now  - Ambulatory referral to Gastroenterology  - Case request operating room: ESOPHAGOGASTRODUODENOSCOPY (EGD); Standing  - Case request operating room: ESOPHAGOGASTRODUODENOSCOPY (EGD)    3  Bloating  4  Diarrhea, unspecified type  5  S/P cholecystectomy  Symptoms of bloating likely functional and related to dietary intake of high FODMAP foods  These symptoms may also be related to her diarrhea, or possibly post-cholecysetctomy syndrome v SIBO  Evaluate for inflammatory disorders, infectious sources, celiac, as below  Suggest she avoid her dietary triggers, especially fried/fatty foods  She may benefit from trial of colestipol vs rifaximin  She should d/c all laxatives  - C-reactive protein; Future  - Sedimentation rate, automated; Future  - Vitamin B12; Future  - Folate; Future  - Clostridium difficile toxin by PCR; Future  - Calprotectin,Fecal; Future  - Pancreatic elastase, fecal; Future  - Tissue transglutaminase, IgA; Future  - IgA; Future  - Stool Enteric Bacterial Panel by PCR; Future    6  History of Nerissa-en-Y gastric bypass        ______________________________________________________________________    HPI:      Patient is a 77-year-old female referred to me for multiple abdominal complaints  She has a past medical history of hypertension, CVA, osteoarthritis, spinal fusion     Her surgical history is extensive including History of Nerissa-en-Y gastric bypass, complicated by recurrent choledocholithiasis requiring lap assisted ERCP, remote history of appendectomy, cholecystectomy, complicated by incisional hernia, status post repair and mesh placement  She has a history of a spinal fusion, requiring spinal stimulator, which was then removed but the wires persists in her spine precluding MRCP evaluation  Her primary complaint is abdominal disfiguration and abdominal bloating  This has been resultant since her multiple abdominal surgeries  She complains of worsening bloating over the past one year, however symptoms have been going on since 2013 and her extensive surgical history  Her symptoms are worse when she needs to have a bowel movement  She complains of irregular bowel habits  At baseline she has five bowel movements daily, this is been with addition of a half cap full of MiraLax  She reports taking the MiraLax because if she does not she becomes constipated due to extensive narcotic pain treatment  If she does not take the MiraLax, she has no bowel movements  She takes the MiraLax she has five bowel movements per day  She has not attempted to not take the MiraLax or titrate down to see if this will treat her diarrhea  She complains of frequent nausea and vomiting associated with acidic regurgitation radiating up her nose  This frequently occurs after meals, most specifically with intake of hot dogs and cheeseburgers  She complains of early satiety associated with the symptoms she also complains of dysphagia primarily to pills  She has been started on Protonix 40 mg, Carafate t i d  a c , Bentyl 4 times daily  None of these medications are helping with her symptoms  She does note her diarrhea frequency is improved with addition of oxybutynin TID  REVIEW OF SYSTEMS:    CONSTITUTIONAL: Denies any fever, chills, rigors, and weight loss  HEENT: No earache or tinnitus  Denies hearing loss or visual disturbances    CARDIOVASCULAR: No chest pain or palpitations  RESPIRATORY: Denies any cough, hemoptysis, shortness of breath or dyspnea on exertion  GASTROINTESTINAL: As noted in the History of Present Illness  GENITOURINARY: No problems with urination  Denies any hematuria or dysuria  NEUROLOGIC: No dizziness or vertigo, denies headaches  MUSCULOSKELETAL: Denies any muscle or joint pain  SKIN: Denies skin rashes or itching  ENDOCRINE: Denies excessive thirst  Denies intolerance to heat or cold  PSYCHOSOCIAL: Denies depression or anxiety  Denies any recent memory loss         Historical Information   Past Medical History:   Diagnosis Date    Anemia     last assessed [id number]    Arthritis     Bowel incontinence     last assessed [id number]    Celiac disease     [facility]otic dependence ([facility] )     Chronic pain disorder     Depression     Diabetes mellitus ([facility]     Disease of thyroid gland     Elevated liver enzymes     history of    Fibromyalgia     Galactorrhea     with normal prolactins    Gastritis     last assessed [id number]    GERD (gastroesophageal reflux disease)     Hyperlipidemia     last assessed [id number]    Hypertension     Hypoglycemia     last assessed [id number]    Left wrist fracture     2017    Lymphadenopathy     right submandibular;  last assessed [id number]    Malabsorption syndrome     Malabsorption syndrome     last assessed [id number]    Malignant neoplasm ([facility]     Nicotine dependence     Obesity     last assessed [id number]    Osteopenia     Right sided weakness     RLS (restless legs syndrome)     Sepsis ([facility] )     dec 2016    Thyroid disease     last assessed [id number]     Past Surgical History:   Procedure Laterality Date    APPENDECTOMY      BACK SURGERY      BARIATRIC SURGERY       SECTION      CHOLECYSTECTOMY      ERCP      GASTRIC BYPASS      patient s/p Nerissa en y     HERNIA REPAIR      three times    HYSTERECTOMY      INCISIONAL HERNIA REPAIR Right gallbladder scar;  last assessed [id number]    KNEE ARTHROSCOPY Bilateral     OK REVISE/REMOVE SPINAL NEUROSTIM/ Left 7/14/2017    Procedure: REMOVAL OF A LEFT BUTTOCK SCS IPG;  Surgeon: Guido Jones MD;  [facility];  Service: Neurosurgery    OK SURG IMPLNT [facility] N/A 8/3/2016    Procedure: PLACEMENT THORACIC SPINAL CORD STIMULATOR WITH LEFT BUTTOCK IMPLANTABLE PULSE GENERATOR;  Surgeon: Guido Jones MD;  [facility];  Service: Neurosurgery    JIGAR-EN-Y PROCEDURE      TUBAL LIGATION      WRIST SURGERY       Social History   History   Alcohol Use No     Comment: active social alcohol use, per [employer]     History   Drug Use No     History   Smoking Status    Current Every Day Smoker    Packs/day: 0 75   Smokeless Tobacco    Never Used     Comment: encouraged pt to quit,tried many things     Family History   Problem Relation Age of Onset    Diabetes Mother     Hypertension Mother    Vena Olayinka Retinal degeneration Mother         secondary to DM complications    Kidney disease Father     Lung cancer Father     Brain cancer Father     Kidney failure Father     Heart disease Family     Diabetes Family     Hypertension Family     Heart attack Family     Hyperlipidemia Family        Meds/Allergies       Current Outpatient Prescriptions:     amLODIPine (NORVASC) 2 5 mg tablet    aspirin 81 mg chewable tablet    atorvastatin (LIPITOR) 80 mg tablet    B Complex-Folic Acid (SUPER B COMPLEX MAXI) TABS    buPROPion (WELLBUTRIN SR) 150 mg 12 hr tablet    diazepam (VALIUM) 5 mg tablet    dicyclomine (BENTYL) 20 mg tablet    fentaNYL (DURAGESIC) 50 mcg/hr    gabapentin (NEURONTIN) 600 MG tablet    gabapentin (NEURONTIN) 600 MG tablet    hydrochlorothiazide (HYDRODIURIL) 12 5 mg tablet    levothyroxine 25 mcg tablet    lisinopril (ZESTRIL) 10 mg tablet    methocarbamol (ROBAXIN) 500 mg tablet    metoprolol succinate (TOPROL-XL) 25 mg 24 hr tablet   metoprolol succinate (TOPROL-XL) 50 mg 24 hr tablet    Multiple Vitamins-Minerals (HAIR/SKIN/NAILS/BIOTIN) TABS    oxybutynin (DITROPAN) 5 mg tablet    oxyCODONE-acetaminophen (PERCOCET) 5-325 mg per tablet    pantoprazole (PROTONIX) 40 mg tablet    sucralfate (CARAFATE) 1 g tablet    triamterene-hydrochlorothiazide (DYAZIDE) 37 5-25 mg per capsule    Allergies   Allergen Reactions    Codeine GI Intolerance    Morphine GI Intolerance    Cortisone      Other reaction(s): Fever    Hydrocortisone Fever    Other     Penicillins      Other reaction(s): Rash           Objective     Blood pressure 136/70, pulse 79, temperature (!) 96 5 °F (35 8 °C), temperature source Tympanic, height 5' 4 5" (1 638 m), weight 69 4 kg (153 lb)  Body mass index is 25 86 kg/m²  PHYSICAL EXAM:      General Appearance:   Alert, cooperative, no distress   HEENT:   Normocephalic, atraumatic, anicteric      Neck:  Supple, symmetrical, trachea midline   Lungs:   Clear to auscultation bilaterally; no rales, rhonchi or wheezing; respirations unlabored    Heart[de-identified]   Regular rate and rhythm; no murmur, rub, or gallop     Abdomen:   Soft, non-distended; normal bowel sounds; no masses, no organomegaly; + multiple abdominal scars with palpated scar tissue; + generalized abdominal pain, mild   Genitalia:   Deferred    Rectal:   Deferred    Extremities:  No cyanosis, clubbing or edema    Pulses:  2+ and symmetric    Skin:  No jaundice, rashes, or lesions    Lymph nodes:  No palpable cervical lymphadenopathy        Lab Results:     Lab Results   Component Value Date    WBC 5 45 04/03/2018    HGB 12 0 04/03/2018    HCT 35 3 04/03/2018    MCV 91 04/03/2018     04/03/2018       Lab Results   Component Value Date     04/03/2018    K 3 9 04/03/2018     04/03/2018    CO2 26 04/03/2018    ANIONGAP 10 04/03/2018    BUN 7 04/03/2018    CREATININE 0 63 04/03/2018    GLUCOSE 92 04/03/2018    GLUF 79 06/15/2017    CALCIUM 8 4 04/03/2018 AST 29 11/13/2015    ALT 94 (H) 11/13/2015    ALKPHOS 540 (H) 11/13/2015    PROT 7 1 11/13/2015    BILITOT 0 27 11/13/2015    EGFR 96 04/03/2018       Lab Results   Component Value Date    INR 0 86 04/02/2018    INR 0 93 06/15/2017    INR 0 90 11/13/2015    PROTIME 11 7 (L) 04/02/2018    PROTIME 12 5 06/15/2017    PROTIME 12 2 11/13/2015         Radiology Results:   None relevant

## 2018-08-02 ENCOUNTER — TELEPHONE (OUTPATIENT)
Dept: GASTROENTEROLOGY | Facility: AMBULARY SURGERY CENTER | Age: 63
End: 2018-08-02

## 2018-08-02 NOTE — TELEPHONE ENCOUNTER
[facility] called stating that the Xifaxan 550mg needs prior auth   Pharmacist can be reached at [phone]

## 2018-08-03 ENCOUNTER — TELEPHONE (OUTPATIENT)
Dept: GASTROENTEROLOGY | Facility: CLINIC | Age: 63
End: 2018-08-03

## 2018-08-03 DIAGNOSIS — R19.7 DIARRHEA, UNSPECIFIED TYPE: Primary | ICD-10-CM

## 2018-08-03 RX ORDER — COLESTIPOL HYDROCHLORIDE 5 G/5G
5 GRANULE, FOR SUSPENSION ORAL DAILY
Qty: 500 G | Refills: 0 | Status: SHIPPED | OUTPATIENT
Start: 2018-08-03 | End: 2020-05-21 | Stop reason: ALTCHOICE

## 2018-08-03 NOTE — TELEPHONE ENCOUNTER
Please let her now I sent colestipol  She should take 4-6 hours before or after other medications   Thank you

## 2018-08-07 NOTE — PROGRESS NOTES
Patient ID: Shaina Dennis is a 61 y o  female  Assessment/Plan:    No problem-specific Assessment & Plan notes found for this encounter  Cerebrovascular accident (CVA) ([address] )  Pt with left MCA infarct 4/2018, doing well, some residual right sided weakness, thought to be sml vessel  Pt with multiple risk factors   Now on ASA/ statin therapy  BP stable   not diabetic, HgA1c 5 5  Pt still smoking, offered cessation program, declined   reviewed s/s cva  If any questions need to go to ED     Diagnoses and all orders for this visit:    Cerebrovascular accident (CVA) due to thrombosis of left middle cerebral artery (HCC)    Benign essential HTN    Tobacco use    Neuropathy           Subjective:    HPI   Valeria Poe a delightful 70-year-old lady with past medical history pmh of HTN, fibromyalgia, gastric bypass with malabsorption syndrome, chronic back pain s/p neuro stimulator removal, restless leg syndrome, peripheral neuropathy who was hospitalized in April 2018 for CVA  Patient states that she woke up on Friday with right hand numbness and weakness Her hand was like a "claw"    She typically stated that she could not grasp, any objects with the right hand  She had some increased right leg weakness, no falls, she has some prior issues due to Lumbar surgery in past  Associated tingling numbness   She felt that this would go away hence did not seek any medical attention   It persisted all throughout the weekend -- patient presented to the  PCP  On the following Monday and was noted to have right-sided weakness and subsequently sent to the  ED  /73 in ER  She was seen by neurology  She had CT head done ( SCS IPG neuro stimulator removed in 7/14/17 but lead wires remain due to the location they were difficult to remove) demonstrating mild chronic small vessel ischemic changes and old bilateral basal ganglia lacunar infarcts but no acute intracranial abnormalities  Pt was unaware of any prior CVA    No history of DM, A-fib  ECHO EF 65%,  Carotids < 50% stenosis B/L    , HDL 53, Triglycerides 80, TSH 1 778  She was not on any prehosp AC  She was started on ASA, statin therapy  She had some improvement in her right side  She was discharged with home P  T      Patient last seen in April 2018  Today she states that she is doing better  Her hand has improved, she notes some ongoing difficulty with writing, as well as decreased dexterity in her right hand     She notes some right leg weakness,  she uses a cane when outside  At she did experience a fall in the interim, secondary to balance issues  She did not seek any medical help at that time however had some issues with left-sided pain particularly of her shoulder for which she was given some prednisone  She is on ASA/Statin, no side effects  She continues on gabapentin for her neuropathic pain, she does note on going discomfort characterized by this sensation is if she has a ball on the bottom of her feet  On her back days, she does have access to a walker, She does describe electric like sensation in her right UE,  She is on chronic pain medication from her PCP for her LBP  Unfortunately she continues to smoke  Down to 5 cigarettes from a pack  She was offered a smoking cessation program which she refused  Chantix in past was not beneficial   She denies any changes In her vision, she does have a minor headache which she feels comes from her cervical region  She is seen in the interim by her PCP for issues with loose stool  As well as new issue of esophageal dysphagia, she was seen by GI and asked to undergo EGD which was essentially unrevealing  She has a strong history in the past of GI problems associated with gastric bypass, and a subsequent sepsis a few years later  Biopsies were done however not available            The following portions of the patient's history were reviewed and updated as appropriate: allergies, current medications, past family history, past medical history, past social history, past surgical history and problem list        Objective:    Blood pressure 143/64, pulse 72, height 5' 4 5" (1 638 m), weight 67 1 kg (148 lb)  Physical Exam   Constitutional: She appears well-developed  HENT:   Head: Normocephalic  Eyes: Pupils are equal, round, and reactive to light  Neck: Normal range of motion  Cardiovascular: Normal rate and regular rhythm  Pulmonary/Chest: Effort normal    Musculoskeletal: She exhibits no edema  Psychiatric: She has a normal mood and affect  Her speech is normal and behavior is normal  Judgment and thought content normal        Neurological Exam    Mental Status  The patient is alert and oriented to person, place, time, and situation  Her speech is normal  Her language is fluent with no aphasia  She has normal attention span and concentration  She has a normal fund of knowledge  Cranial Nerves  CN I: The patient has not tested  CN II: The patient's visual acuity and visual fields are normal   CN III, IV, VI: The patient's pupils are equally round and reactive to light  CN V: The patient has normal facial sensation  CN XI: The patient's shoulder shrug strength is normal   CN XII: The patient's tongue is midline  No resting asymmetry, evidence of lower facial weakness noted with movement     Motor  The patient has normal muscle bulk throughout  Her overall muscle tone is normal throughout  She has normal movement  Her strength is 5/5 in all four extremities except as noted  Upper extremities intact  Right hip flexor weakness4-/5, dorsiflexion5-  Sensory    Reports decreased temperature left side versus right, distal temperature loss lower extremities to mid calf on right, above ankle on left     Reflexes  Reflexes brisk on right > left     Gait and Coordination   She has normal right finger to nose and normal left finger to nose coordination  Ambulates with a cane, mild wide-based gait noted  Mild off point and slower on the left, no ataxia or dysmetria no pronator drift         ROS:    Review of Systems   Musculoskeletal: Positive for arthralgias, back pain, gait problem, myalgias and neck pain  All other systems reviewed and are negative

## 2018-08-08 ENCOUNTER — TELEPHONE (OUTPATIENT)
Dept: FAMILY MEDICINE CLINIC | Facility: CLINIC | Age: 63
End: 2018-08-08

## 2018-08-08 ENCOUNTER — OFFICE VISIT (OUTPATIENT)
Dept: NEUROLOGY | Facility: CLINIC | Age: 63
End: 2018-08-08
Payer: COMMERCIAL

## 2018-08-08 VITALS
WEIGHT: 148 LBS | BODY MASS INDEX: 24.66 KG/M2 | HEIGHT: 65 IN | HEART RATE: 72 BPM | SYSTOLIC BLOOD PRESSURE: 143 MMHG | DIASTOLIC BLOOD PRESSURE: 64 MMHG

## 2018-08-08 DIAGNOSIS — I63.312 CEREBROVASCULAR ACCIDENT (CVA) DUE TO THROMBOSIS OF LEFT MIDDLE CEREBRAL ARTERY (HCC): Primary | ICD-10-CM

## 2018-08-08 DIAGNOSIS — Z72.0 TOBACCO USE: Chronic | ICD-10-CM

## 2018-08-08 DIAGNOSIS — G62.9 NEUROPATHY: ICD-10-CM

## 2018-08-08 DIAGNOSIS — I10 BENIGN ESSENTIAL HTN: ICD-10-CM

## 2018-08-08 PROCEDURE — 99214 OFFICE O/P EST MOD 30 MIN: CPT | Performed by: NURSE PRACTITIONER

## 2018-08-08 NOTE — TELEPHONE ENCOUNTER
I did speak to the patient, she states she uses [facility] for her labs  We do not have any specimen containers for [facility]

## 2018-08-08 NOTE — TELEPHONE ENCOUNTER
That is an OOPS on our part if we have to check on coverage for everyone  So do we HAVE the specimen containers for [facility] to do? What lab WAS she suppose yo do?

## 2018-08-08 NOTE — TELEPHONE ENCOUNTER
Patient called stating she received a bill from [facility] [place] [de-identified] for $331 00 for date of service 5/17/18 for urine drug screening  Her insurance does not allow her to use [facility] and will not pay for this

## 2018-08-10 NOTE — TELEPHONE ENCOUNTER
Called [employer]My[employer]eds; they are asking about Xifaxan prior auth- they would like to know if we need any help getting the auth done  I told them that this prior auth was done through [facility] already and this has been approved; however has a very high copay, so an alternative med was called in for the patient

## 2018-08-14 DIAGNOSIS — G89.29 OTHER CHRONIC PAIN: ICD-10-CM

## 2018-08-14 RX ORDER — FENTANYL 50 UG/H
1 PATCH TRANSDERMAL
Qty: 10 PATCH | Refills: 0 | Status: SHIPPED | OUTPATIENT
Start: 2018-08-14 | End: 2018-09-12 | Stop reason: SDUPTHER

## 2018-08-14 NOTE — TELEPHONE ENCOUNTER
She can stop the colestipol and try the xifaxan to see if it gives her relief, if it does not then she can resume the colestipol  Please remind her to take her colestipol 4 hours before or after other medications   Thank you

## 2018-08-14 NOTE — TELEPHONE ENCOUNTER
Spoke to patient and she is willing to pay the $300 for the xifaxan so should she stop the colestipol or take them both  Please advise

## 2018-08-22 DIAGNOSIS — G62.9 NEUROPATHY: Primary | ICD-10-CM

## 2018-08-22 RX ORDER — GABAPENTIN 600 MG/1
TABLET ORAL
Qty: 360 TABLET | Refills: 0 | Status: SHIPPED | OUTPATIENT
Start: 2018-08-22 | End: 2019-01-08

## 2018-08-23 DIAGNOSIS — I10 BENIGN ESSENTIAL HTN: ICD-10-CM

## 2018-08-23 RX ORDER — RIFAXIMIN 550 MG/1
TABLET ORAL
Refills: 0 | COMMUNITY
Start: 2018-08-13 | End: 2020-06-23

## 2018-08-23 RX ORDER — METOPROLOL SUCCINATE 25 MG/1
25 TABLET, EXTENDED RELEASE ORAL DAILY
Qty: 90 TABLET | Refills: 3 | Status: SHIPPED | OUTPATIENT
Start: 2018-08-23 | End: 2019-08-28 | Stop reason: SDUPTHER

## 2018-08-23 RX ORDER — HYDROCHLOROTHIAZIDE 12.5 MG/1
TABLET ORAL
Qty: 90 TABLET | Refills: 0 | Status: SHIPPED | OUTPATIENT
Start: 2018-08-23 | End: 2019-01-08

## 2018-08-29 DIAGNOSIS — F41.9 ANXIETY: ICD-10-CM

## 2018-08-29 DIAGNOSIS — G89.4 CHRONIC PAIN DISORDER: ICD-10-CM

## 2018-08-29 RX ORDER — DIAZEPAM 5 MG/1
TABLET ORAL
Qty: 60 TABLET | Refills: 0 | Status: SHIPPED | OUTPATIENT
Start: 2018-08-29 | End: 2018-09-27 | Stop reason: SDUPTHER

## 2018-08-29 RX ORDER — OXYCODONE HYDROCHLORIDE AND ACETAMINOPHEN 5; 325 MG/1; MG/1
1 TABLET ORAL EVERY 6 HOURS PRN
Qty: 120 TABLET | Refills: 0 | Status: SHIPPED | OUTPATIENT
Start: 2018-08-29 | End: 2018-09-27 | Stop reason: SDUPTHER

## 2018-08-31 DIAGNOSIS — M15.9 GENERALIZED OSTEOARTHRITIS: ICD-10-CM

## 2018-08-31 RX ORDER — METHOCARBAMOL 500 MG/1
TABLET, FILM COATED ORAL
Qty: 270 TABLET | Refills: 0 | Status: SHIPPED | OUTPATIENT
Start: 2018-08-31 | End: 2018-11-14 | Stop reason: SDUPTHER

## 2018-09-12 DIAGNOSIS — G89.29 OTHER CHRONIC PAIN: ICD-10-CM

## 2018-09-12 RX ORDER — FENTANYL 50 UG/H
1 PATCH TRANSDERMAL
Qty: 10 PATCH | Refills: 0 | Status: SHIPPED | OUTPATIENT
Start: 2018-09-12 | End: 2018-10-12 | Stop reason: SDUPTHER

## 2018-09-27 DIAGNOSIS — F41.9 ANXIETY: ICD-10-CM

## 2018-09-27 DIAGNOSIS — G89.4 CHRONIC PAIN DISORDER: ICD-10-CM

## 2018-09-27 RX ORDER — DIAZEPAM 5 MG/1
TABLET ORAL
Qty: 60 TABLET | Refills: 0 | Status: SHIPPED | OUTPATIENT
Start: 2018-09-27 | End: 2018-10-26 | Stop reason: SDUPTHER

## 2018-09-27 RX ORDER — OXYCODONE HYDROCHLORIDE AND ACETAMINOPHEN 5; 325 MG/1; MG/1
1 TABLET ORAL EVERY 6 HOURS PRN
Qty: 120 TABLET | Refills: 0 | Status: SHIPPED | OUTPATIENT
Start: 2018-09-27 | End: 2018-10-26 | Stop reason: SDUPTHER

## 2018-09-27 NOTE — TELEPHONE ENCOUNTER
Patient called the script line to request a refill for oxycodone and diazepam to be sent to [place]  Please authorize scripts

## 2018-10-03 ENCOUNTER — TELEPHONE (OUTPATIENT)
Dept: FAMILY MEDICINE CLINIC | Facility: CLINIC | Age: 63
End: 2018-10-03

## 2018-10-03 NOTE — TELEPHONE ENCOUNTER
----- Message from [address] sent at 10/2/2018  2:52 PM EDT -----  Please verify that her HCTZ looks the same as there was a recall cause it may be spironlactone  If they look different she is to bring them to the pharmacy for evaluation   HCTZ is usually light oragne/peach round with H on one side and 1 on the other

## 2018-10-12 DIAGNOSIS — G89.29 OTHER CHRONIC PAIN: ICD-10-CM

## 2018-10-12 RX ORDER — FENTANYL 50 UG/H
1 PATCH TRANSDERMAL
Qty: 10 PATCH | Refills: 0 | Status: SHIPPED | OUTPATIENT
Start: 2018-10-12 | End: 2018-11-26 | Stop reason: SDUPTHER

## 2018-10-15 ENCOUNTER — TELEPHONE (OUTPATIENT)
Dept: FAMILY MEDICINE CLINIC | Facility: CLINIC | Age: 63
End: 2018-10-15

## 2018-10-15 DIAGNOSIS — L98.9 SKIN LESION: Primary | ICD-10-CM

## 2018-10-15 NOTE — TELEPHONE ENCOUNTER
Patient called the script line asking for a 90 day supply/ refill for mupirocin ointment 3 tubes to be sent to [place]  I do not see it on the medication list  Is this ok to do?

## 2018-10-17 DIAGNOSIS — M15.9 GENERALIZED OSTEOARTHRITIS: ICD-10-CM

## 2018-10-17 DIAGNOSIS — G62.9 NEUROPATHY: Primary | ICD-10-CM

## 2018-10-17 RX ORDER — GABAPENTIN 600 MG/1
TABLET ORAL
Qty: 360 TABLET | Refills: 0 | Status: SHIPPED | OUTPATIENT
Start: 2018-10-17 | End: 2019-01-08

## 2018-10-17 RX ORDER — MUPIROCIN CALCIUM 20 MG/G
CREAM TOPICAL 3 TIMES DAILY
Qty: 30 G | Refills: 3 | Status: SHIPPED | OUTPATIENT
Start: 2018-10-17 | End: 2020-06-23

## 2018-10-26 DIAGNOSIS — I10 ESSENTIAL HYPERTENSION: ICD-10-CM

## 2018-10-26 DIAGNOSIS — G89.4 CHRONIC PAIN DISORDER: ICD-10-CM

## 2018-10-26 DIAGNOSIS — F41.9 ANXIETY: ICD-10-CM

## 2018-10-26 RX ORDER — OXYCODONE HYDROCHLORIDE AND ACETAMINOPHEN 5; 325 MG/1; MG/1
1 TABLET ORAL EVERY 6 HOURS PRN
Qty: 120 TABLET | Refills: 0 | Status: SHIPPED | OUTPATIENT
Start: 2018-10-26 | End: 2018-11-26 | Stop reason: SDUPTHER

## 2018-10-26 RX ORDER — AMLODIPINE BESYLATE 2.5 MG/1
TABLET ORAL
Qty: 90 TABLET | Refills: 3 | Status: SHIPPED | OUTPATIENT
Start: 2018-10-26

## 2018-10-26 RX ORDER — DIAZEPAM 5 MG/1
TABLET ORAL
Qty: 60 TABLET | Refills: 0 | Status: SHIPPED | OUTPATIENT
Start: 2018-10-26 | End: 2018-11-26 | Stop reason: SDUPTHER

## 2018-11-14 DIAGNOSIS — K21.9 GASTROESOPHAGEAL REFLUX DISEASE WITHOUT ESOPHAGITIS: Primary | ICD-10-CM

## 2018-11-14 DIAGNOSIS — G25.81 RESTLESS LEGS SYNDROME: ICD-10-CM

## 2018-11-14 DIAGNOSIS — M15.9 GENERALIZED OSTEOARTHRITIS: ICD-10-CM

## 2018-11-14 DIAGNOSIS — M79.7 FIBROMYALGIA: ICD-10-CM

## 2018-11-14 RX ORDER — GABAPENTIN 600 MG/1
TABLET ORAL
Qty: 450 TABLET | Refills: 0 | Status: SHIPPED | OUTPATIENT
Start: 2018-11-14 | End: 2019-01-08

## 2018-11-14 RX ORDER — METHOCARBAMOL 500 MG/1
TABLET, FILM COATED ORAL
Qty: 270 TABLET | Refills: 0 | Status: SHIPPED | OUTPATIENT
Start: 2018-11-14 | End: 2018-12-19 | Stop reason: CLARIF

## 2018-11-14 RX ORDER — PANTOPRAZOLE SODIUM 40 MG/1
TABLET, DELAYED RELEASE ORAL
Qty: 180 TABLET | Refills: 0 | Status: SHIPPED | OUTPATIENT
Start: 2018-11-14 | End: 2020-08-24 | Stop reason: SDUPTHER

## 2018-11-14 RX ORDER — SUCRALFATE 1 G/1
TABLET ORAL
Qty: 360 TABLET | Refills: 0 | Status: SHIPPED | OUTPATIENT
Start: 2018-11-14 | End: 2020-05-21

## 2018-11-26 ENCOUNTER — TELEPHONE (OUTPATIENT)
Dept: FAMILY MEDICINE CLINIC | Facility: CLINIC | Age: 63
End: 2018-11-26

## 2018-11-26 DIAGNOSIS — F41.9 ANXIETY: ICD-10-CM

## 2018-11-26 DIAGNOSIS — G89.4 CHRONIC PAIN DISORDER: ICD-10-CM

## 2018-11-26 DIAGNOSIS — G89.29 OTHER CHRONIC PAIN: ICD-10-CM

## 2018-11-26 RX ORDER — DIAZEPAM 5 MG/1
5 TABLET ORAL EVERY 12 HOURS PRN
Qty: 60 TABLET | Refills: 0 | Status: SHIPPED | OUTPATIENT
Start: 2018-11-26 | End: 2018-12-31 | Stop reason: SDUPTHER

## 2018-11-26 RX ORDER — FENTANYL 50 UG/H
1 PATCH TRANSDERMAL
Qty: 10 PATCH | Refills: 0 | Status: SHIPPED | OUTPATIENT
Start: 2018-11-26 | End: 2019-01-08 | Stop reason: SDUPTHER

## 2018-11-26 RX ORDER — OXYCODONE HYDROCHLORIDE AND ACETAMINOPHEN 5; 325 MG/1; MG/1
1 TABLET ORAL EVERY 6 HOURS PRN
Qty: 120 TABLET | Refills: 0 | Status: SHIPPED | OUTPATIENT
Start: 2018-11-26 | End: 2018-12-31 | Stop reason: SDUPTHER

## 2018-11-26 NOTE — TELEPHONE ENCOUNTER
Patient called and stated she moved out to [place] and she is having a very hard time finding a new PCP  They will not take any new patients that are on any narcotics  She has tried every PCP in her town and even 14 miles away and they will not take her on  She needs a refill tho on her fentanyl, oxycodone, and diazepam  Asking if you are willing to prescribe her these until she can find someone to take her on  The next town is 36 miles away and she is calling them as well

## 2018-11-29 DIAGNOSIS — R60.0 LOCALIZED EDEMA: ICD-10-CM

## 2018-11-29 RX ORDER — TRIAMTERENE AND HYDROCHLOROTHIAZIDE 37.5; 25 MG/1; MG/1
1 CAPSULE ORAL EVERY MORNING
Qty: 90 CAPSULE | Refills: 0 | Status: SHIPPED | OUTPATIENT
Start: 2018-11-29 | End: 2019-02-25 | Stop reason: SDUPTHER

## 2018-12-13 ENCOUNTER — TELEPHONE (OUTPATIENT)
Dept: GASTROENTEROLOGY | Facility: MEDICAL CENTER | Age: 63
End: 2018-12-13

## 2018-12-13 NOTE — TELEPHONE ENCOUNTER
Dr Mandeep Phillips pt  Mikayla Stanton [facility] called stating they need more dx on pt labs for Vitamin B12 and Folate  Please call [phone] push 1 for English and 3 for Dx inquiry [facility]#[id number]   Fax #[phone]

## 2018-12-14 DIAGNOSIS — K91.2 POSTSURGICAL MALABSORPTION: Primary | ICD-10-CM

## 2018-12-14 PROBLEM — Z98.84 HISTORY OF ROUX-EN-Y GASTRIC BYPASS: Status: ACTIVE | Noted: 2018-12-14

## 2018-12-14 NOTE — TELEPHONE ENCOUNTER
Can you call & use dx: hx of ly-n-y gastric bypass, I think the code is Z98 84    Thanks  Pepper Kelly

## 2018-12-14 NOTE — TELEPHONE ENCOUNTER
Can you add K91 2 Postsurgical malabsorption, not elsewhere classified do her chart?  That's the code that was accepted as all of the codes listed in her last OV would not be covered

## 2018-12-17 DIAGNOSIS — I10 BENIGN ESSENTIAL HTN: ICD-10-CM

## 2018-12-19 DIAGNOSIS — M15.9 GENERALIZED OSTEOARTHROSIS, INVOLVING MULTIPLE SITES: Primary | ICD-10-CM

## 2018-12-19 RX ORDER — TIZANIDINE HYDROCHLORIDE 2 MG/1
2 CAPSULE, GELATIN COATED ORAL 3 TIMES DAILY
Qty: 270 CAPSULE | Refills: 0 | Status: SHIPPED | OUTPATIENT
Start: 2018-12-19 | End: 2020-06-23

## 2018-12-19 RX ORDER — HYDROCHLOROTHIAZIDE 12.5 MG/1
TABLET ORAL
Qty: 90 TABLET | Refills: 3 | Status: SHIPPED | OUTPATIENT
Start: 2018-12-19 | End: 2019-01-08

## 2018-12-20 ENCOUNTER — TELEPHONE (OUTPATIENT)
Dept: FAMILY MEDICINE CLINIC | Facility: CLINIC | Age: 63
End: 2018-12-20

## 2018-12-20 NOTE — TELEPHONE ENCOUNTER
Submitted [place] via cover my meds for tizanidine 2mg capsules # 270 1 tid     humana id [id number]        JAYDA

## 2018-12-31 DIAGNOSIS — F41.9 ANXIETY: ICD-10-CM

## 2018-12-31 DIAGNOSIS — G89.4 CHRONIC PAIN DISORDER: ICD-10-CM

## 2018-12-31 RX ORDER — DIAZEPAM 5 MG/1
5 TABLET ORAL EVERY 12 HOURS PRN
Qty: 60 TABLET | Refills: 0 | Status: SHIPPED | OUTPATIENT
Start: 2018-12-31 | End: 2019-01-29 | Stop reason: SDUPTHER

## 2018-12-31 RX ORDER — OXYCODONE HYDROCHLORIDE AND ACETAMINOPHEN 5; 325 MG/1; MG/1
1 TABLET ORAL EVERY 6 HOURS PRN
Qty: 120 TABLET | Refills: 0 | Status: SHIPPED | OUTPATIENT
Start: 2018-12-31 | End: 2019-01-29 | Stop reason: SDUPTHER

## 2018-12-31 NOTE — TELEPHONE ENCOUNTER
Patient would like a refill  States she cannot find a PCP at her new location because of the medication she is on  She scheduled an appointment for 01/08/19 at 3:00pm when she is back in [place] to visit family, but would like medication approved ASAP due to the pain she is in  Please advise

## 2019-01-08 ENCOUNTER — OFFICE VISIT (OUTPATIENT)
Dept: FAMILY MEDICINE CLINIC | Facility: CLINIC | Age: 64
End: 2019-01-08
Payer: COMMERCIAL

## 2019-01-08 VITALS
DIASTOLIC BLOOD PRESSURE: 70 MMHG | WEIGHT: 141.4 LBS | HEIGHT: 65 IN | SYSTOLIC BLOOD PRESSURE: 110 MMHG | BODY MASS INDEX: 23.56 KG/M2

## 2019-01-08 DIAGNOSIS — S32.010S CLOSED COMPRESSION FRACTURE OF FIRST LUMBAR VERTEBRA, SEQUELA: ICD-10-CM

## 2019-01-08 DIAGNOSIS — M96.1 POSTLAMINECTOMY SYNDROME OF LUMBAR REGION: ICD-10-CM

## 2019-01-08 DIAGNOSIS — G89.29 OTHER CHRONIC PAIN: ICD-10-CM

## 2019-01-08 DIAGNOSIS — M15.9 GENERALIZED OSTEOARTHRITIS: ICD-10-CM

## 2019-01-08 DIAGNOSIS — M48.061 LUMBAR STENOSIS WITHOUT NEUROGENIC CLAUDICATION: ICD-10-CM

## 2019-01-08 DIAGNOSIS — I10 BENIGN ESSENTIAL HTN: Primary | ICD-10-CM

## 2019-01-08 PROCEDURE — 99214 OFFICE O/P EST MOD 30 MIN: CPT | Performed by: FAMILY MEDICINE

## 2019-01-08 RX ORDER — FENTANYL 50 UG/H
1 PATCH TRANSDERMAL
Qty: 10 PATCH | Refills: 0 | Status: SHIPPED | OUTPATIENT
Start: 2019-01-08 | End: 2020-05-21 | Stop reason: ALTCHOICE

## 2019-01-08 NOTE — PROGRESS NOTES
Assessment/Plan:     Diagnoses and all orders for this visit:    Benign essential HTN    Other chronic pain  -     fentaNYL (DURAGESIC) 50 mcg/hr; Place 1 patch on the skin every third day (Inquiry done [facility]) Max Daily Amount: 1 patch    Postlaminectomy syndrome of lumbar region    Generalized osteoarthritis    Closed compression fracture of first lumbar vertebra, sequela    Lumbar stenosis without neurogenic claudication        Patient is now permanently living in [place] and is visiting family for the holidays  I will try to see if there is any way I can communicate with my colleagues in [place], [place] to see I can facilitate her being able to get into primary care  I will provide her prescriptions for now and until we can transfer her care  Time spent 25+ minutes with greater than 50% counseling performed  Subjective:   Chief Complaint   Patient presents with    Follow-up     here for check up, cannot establish a Dr in [place], states that noone will take her as a patient there due to the medications that she is on  Patient lives in [place] currently, just here for a visit  Patient ID: Miryam Davies is a 61 y o  female  This is a 89year old female is here back visiting during the holidays from [place]   Currently she has been able to find no primary care physician who will take over her prescriptions  I have been sending them in without any problems with their local pharmacies  Apparently there is some significant problems out there with the opioid issue and Dr Gloria Wong  Apparently the primary care physician's will not prescribe narcotics and the only way the patient can get them is from a pain specialist   However since she can't even get into a primary care practice she cannot self refer to Pain Management  She also needs GI follow-up and unfortunately because she cannot get in with a PCP she is having difficulty getting that set up    She is compliant with all of her other medications that she takes for hypertension etc   For some reason her fentanyl was not renewed on December 26 when it was due  That may have been an error from our office  I sent into the local [facility] here  Her med monitoring reviews have always been without suspicion  Her urine drug screens have always been consistent  Patient's most recent EGD was July 2018  Patient's last hospitalization was in April of 2018 where she suffered a left MCA territory acute subacute infarction  She has hypothyroid and history of celiac disease  She also has generalized anxiety disorder with depression  She has had a remote Nerissa-en-Y gastric surgery for weight loss (2005)  Patient has post laminectomy syndrome and chronic pain syndrome and had a failed implantable nerve stimulator that was removed in 2017  Initial placement was done in 2016 at the T9 10 level  Patient has been followed in the past by Pain Management  They were not able to offer her any other options for pain management other than medication  Her original surgery was in April of 2013 retirement L3 and L4  PSF L3-L4 with left decompression ([facility]) Patient also has significant debilitating osteoarthritis of the knees    The following portions of the patient's history were reviewed and updated as appropriate: allergies, current medications, past family history, past medical history, past social history, past surgical history and problem list     Review of Systems   Constitutional: Positive for fatigue  HENT: Positive for rhinorrhea  Eyes: Negative for visual disturbance  Respiratory: Negative for choking and shortness of breath  Cardiovascular: Negative for chest pain and leg swelling  Gastrointestinal: Positive for abdominal distention (Bloating reflux)  Genitourinary: Negative  Musculoskeletal: Positive for arthralgias, back pain and gait problem          As noted historically in HPI   Psychiatric/Behavioral: Depressed mood from chronic pain         Objective:      /70   Ht 5' 4 5" (1 638 m)   Wt 64 1 kg (141 lb 6 4 oz)   BMI 23 90 kg/m²          Physical Exam   Constitutional: She is oriented to person, place, and time  She appears well-developed and well-nourished  30-year-old female who appears older than her stated age and in significant discomfort  BMI of 23%     Cardiovascular: Normal rate, regular rhythm and intact distal pulses  Pulmonary/Chest: Effort normal and breath sounds normal    Abdominal: Soft  Musculoskeletal:   Gait antalgic  Significantly decreased range of motion of knee bilaterally as well as left shoulder greater than right  Patient has significant decreased range of motion in lumbar flexion  0 degree extension  Side bending to 10° bilaterally rotation to 20° bilaterally toes downgoing  Reflexes diminished patella   Neurological: She is alert and oriented to person, place, and time  Psychiatric: She has a normal mood and affect   Her behavior is normal  Judgment and thought content normal

## 2019-01-29 DIAGNOSIS — G89.4 CHRONIC PAIN DISORDER: ICD-10-CM

## 2019-01-29 DIAGNOSIS — F41.9 ANXIETY: ICD-10-CM

## 2019-01-29 RX ORDER — DIAZEPAM 5 MG/1
5 TABLET ORAL EVERY 12 HOURS PRN
Qty: 60 TABLET | Refills: 0 | Status: SHIPPED | OUTPATIENT
Start: 2019-01-29 | End: 2020-06-23 | Stop reason: SDUPTHER

## 2019-01-29 RX ORDER — OXYCODONE HYDROCHLORIDE AND ACETAMINOPHEN 5; 325 MG/1; MG/1
1 TABLET ORAL EVERY 6 HOURS PRN
Qty: 120 TABLET | Refills: 0 | Status: SHIPPED | OUTPATIENT
Start: 2019-01-29 | End: 2020-06-23 | Stop reason: SDUPTHER

## 2019-01-29 NOTE — TELEPHONE ENCOUNTER
Please authorize  Needs it sent to [facility]    PMED: 10/26/2018  #120 30 day

## 2019-02-10 DIAGNOSIS — M79.7 FIBROMYALGIA: ICD-10-CM

## 2019-02-10 DIAGNOSIS — M15.9 GENERALIZED OSTEOARTHRITIS: ICD-10-CM

## 2019-02-10 DIAGNOSIS — G25.81 RESTLESS LEGS SYNDROME: ICD-10-CM

## 2019-02-12 RX ORDER — GABAPENTIN 600 MG/1
TABLET ORAL
Qty: 450 TABLET | Refills: 0 | Status: SHIPPED | OUTPATIENT
Start: 2019-02-12 | End: 2020-05-21

## 2019-02-25 DIAGNOSIS — R60.0 LOCALIZED EDEMA: ICD-10-CM

## 2019-02-25 RX ORDER — TRIAMTERENE AND HYDROCHLOROTHIAZIDE 37.5; 25 MG/1; MG/1
1 CAPSULE ORAL EVERY MORNING
Qty: 90 CAPSULE | Refills: 0 | Status: SHIPPED | OUTPATIENT
Start: 2019-02-25 | End: 2019-12-25 | Stop reason: SDUPTHER

## 2019-03-24 DIAGNOSIS — E07.9 DISEASE OF THYROID GLAND: ICD-10-CM

## 2019-03-24 RX ORDER — LEVOTHYROXINE SODIUM 0.03 MG/1
TABLET ORAL
Qty: 90 TABLET | Refills: 0 | Status: SHIPPED | OUTPATIENT
Start: 2019-03-24 | End: 2019-12-07 | Stop reason: SDUPTHER

## 2019-04-10 DIAGNOSIS — N32.81 OAB (OVERACTIVE BLADDER): ICD-10-CM

## 2019-04-10 RX ORDER — OXYBUTYNIN CHLORIDE 5 MG/1
TABLET ORAL
Qty: 270 TABLET | Refills: 0 | Status: SHIPPED | OUTPATIENT
Start: 2019-04-10 | End: 2019-07-05 | Stop reason: SDUPTHER

## 2019-04-11 DIAGNOSIS — Z72.0 TOBACCO USE: Chronic | ICD-10-CM

## 2019-04-12 RX ORDER — BUPROPION HYDROCHLORIDE 150 MG/1
TABLET, EXTENDED RELEASE ORAL DAILY
Qty: 90 TABLET | Refills: 0 | Status: SHIPPED | OUTPATIENT
Start: 2019-04-12 | End: 2019-08-03 | Stop reason: SDUPTHER

## 2019-04-15 ENCOUNTER — TELEPHONE (OUTPATIENT)
Dept: FAMILY MEDICINE CLINIC | Facility: CLINIC | Age: 64
End: 2019-04-15

## 2019-07-05 DIAGNOSIS — N32.81 OAB (OVERACTIVE BLADDER): ICD-10-CM

## 2019-07-05 RX ORDER — OXYBUTYNIN CHLORIDE 5 MG/1
TABLET ORAL
Qty: 270 TABLET | Refills: 3 | Status: SHIPPED | OUTPATIENT
Start: 2019-07-05 | End: 2020-03-25

## 2019-08-03 DIAGNOSIS — Z72.0 TOBACCO USE: Chronic | ICD-10-CM

## 2019-08-03 RX ORDER — BUPROPION HYDROCHLORIDE 150 MG/1
TABLET, EXTENDED RELEASE ORAL DAILY
Qty: 90 TABLET | Refills: 0 | Status: SHIPPED | OUTPATIENT
Start: 2019-08-03 | End: 2019-10-27 | Stop reason: SDUPTHER

## 2019-08-07 DIAGNOSIS — R53.1 RIGHT SIDED WEAKNESS: ICD-10-CM

## 2019-08-07 DIAGNOSIS — I63.9 CEREBROVASCULAR ACCIDENT (CVA), UNSPECIFIED MECHANISM (HCC): ICD-10-CM

## 2019-08-07 RX ORDER — ATORVASTATIN CALCIUM 80 MG/1
TABLET, FILM COATED ORAL
Qty: 90 TABLET | Refills: 0 | Status: SHIPPED | OUTPATIENT
Start: 2019-08-07 | End: 2019-12-26 | Stop reason: SDUPTHER

## 2019-08-28 DIAGNOSIS — I10 BENIGN ESSENTIAL HTN: ICD-10-CM

## 2019-08-28 RX ORDER — METOPROLOL SUCCINATE 25 MG/1
TABLET, EXTENDED RELEASE ORAL
Qty: 90 TABLET | Refills: 0 | Status: SHIPPED | OUTPATIENT
Start: 2019-08-28

## 2019-10-27 DIAGNOSIS — Z72.0 TOBACCO USE: Chronic | ICD-10-CM

## 2019-10-27 RX ORDER — BUPROPION HYDROCHLORIDE 150 MG/1
TABLET, EXTENDED RELEASE ORAL DAILY
Qty: 90 TABLET | Refills: 0 | Status: SHIPPED | OUTPATIENT
Start: 2019-10-27 | End: 2020-01-23

## 2019-12-07 DIAGNOSIS — E07.9 DISEASE OF THYROID GLAND: ICD-10-CM

## 2019-12-09 RX ORDER — LEVOTHYROXINE SODIUM 0.03 MG/1
TABLET ORAL
Qty: 90 TABLET | Refills: 0 | Status: SHIPPED | OUTPATIENT
Start: 2019-12-09 | End: 2020-06-16

## 2019-12-25 DIAGNOSIS — R60.0 LOCALIZED EDEMA: ICD-10-CM

## 2019-12-26 DIAGNOSIS — I63.9 CEREBROVASCULAR ACCIDENT (CVA), UNSPECIFIED MECHANISM (HCC): ICD-10-CM

## 2019-12-26 DIAGNOSIS — R53.1 RIGHT SIDED WEAKNESS: ICD-10-CM

## 2019-12-26 RX ORDER — ATORVASTATIN CALCIUM 80 MG/1
TABLET, FILM COATED ORAL
Qty: 90 TABLET | Refills: 0 | Status: SHIPPED | OUTPATIENT
Start: 2019-12-26 | End: 2020-03-23

## 2019-12-26 RX ORDER — TRIAMTERENE AND HYDROCHLOROTHIAZIDE 37.5; 25 MG/1; MG/1
1 CAPSULE ORAL EVERY MORNING
Qty: 90 CAPSULE | Refills: 0 | Status: SHIPPED | OUTPATIENT
Start: 2019-12-26 | End: 2020-03-23

## 2020-01-23 DIAGNOSIS — Z72.0 TOBACCO USE: Chronic | ICD-10-CM

## 2020-01-23 RX ORDER — BUPROPION HYDROCHLORIDE 150 MG/1
TABLET, EXTENDED RELEASE ORAL DAILY
Qty: 90 TABLET | Refills: 0 | Status: SHIPPED | OUTPATIENT
Start: 2020-01-23 | End: 2020-04-20

## 2020-03-23 DIAGNOSIS — R53.1 RIGHT SIDED WEAKNESS: ICD-10-CM

## 2020-03-23 DIAGNOSIS — R60.0 LOCALIZED EDEMA: ICD-10-CM

## 2020-03-23 DIAGNOSIS — I63.9 CEREBROVASCULAR ACCIDENT (CVA), UNSPECIFIED MECHANISM (HCC): ICD-10-CM

## 2020-03-23 RX ORDER — TRIAMTERENE AND HYDROCHLOROTHIAZIDE 37.5; 25 MG/1; MG/1
1 CAPSULE ORAL EVERY MORNING
Qty: 90 CAPSULE | Refills: 0 | Status: SHIPPED | OUTPATIENT
Start: 2020-03-23 | End: 2020-06-16

## 2020-03-23 RX ORDER — ATORVASTATIN CALCIUM 80 MG/1
TABLET, FILM COATED ORAL
Qty: 90 TABLET | Refills: 0 | Status: SHIPPED | OUTPATIENT
Start: 2020-03-23 | End: 2020-06-16

## 2020-03-25 DIAGNOSIS — N32.81 OAB (OVERACTIVE BLADDER): ICD-10-CM

## 2020-03-25 RX ORDER — OXYBUTYNIN CHLORIDE 5 MG/1
TABLET ORAL
Qty: 270 TABLET | Refills: 3 | Status: SHIPPED | OUTPATIENT
Start: 2020-03-25 | End: 2020-06-23 | Stop reason: SDUPTHER

## 2020-04-20 DIAGNOSIS — Z72.0 TOBACCO USE: Chronic | ICD-10-CM

## 2020-04-20 RX ORDER — BUPROPION HYDROCHLORIDE 150 MG/1
TABLET, EXTENDED RELEASE ORAL DAILY
Qty: 90 TABLET | Refills: 0 | Status: SHIPPED | OUTPATIENT
Start: 2020-04-20 | End: 2020-06-23 | Stop reason: SDUPTHER

## 2020-05-21 ENCOUNTER — TELEMEDICINE (OUTPATIENT)
Dept: FAMILY MEDICINE CLINIC | Facility: CLINIC | Age: 65
End: 2020-05-21
Payer: COMMERCIAL

## 2020-05-21 VITALS — BODY MASS INDEX: 31.26 KG/M2 | WEIGHT: 185 LBS

## 2020-05-21 DIAGNOSIS — I10 BENIGN ESSENTIAL HTN: ICD-10-CM

## 2020-05-21 DIAGNOSIS — G89.4 CHRONIC PAIN DISORDER: ICD-10-CM

## 2020-05-21 DIAGNOSIS — M15.9 GENERALIZED OSTEOARTHRITIS: ICD-10-CM

## 2020-05-21 DIAGNOSIS — M79.7 FIBROMYALGIA: Primary | ICD-10-CM

## 2020-05-21 DIAGNOSIS — F41.9 ANXIETY: ICD-10-CM

## 2020-05-21 DIAGNOSIS — E07.9 DISEASE OF THYROID GLAND: ICD-10-CM

## 2020-05-21 DIAGNOSIS — K91.2 POSTSURGICAL MALABSORPTION: ICD-10-CM

## 2020-05-21 DIAGNOSIS — K21.9 GASTROESOPHAGEAL REFLUX DISEASE WITHOUT ESOPHAGITIS: ICD-10-CM

## 2020-05-21 DIAGNOSIS — M96.1 POSTLAMINECTOMY SYNDROME OF LUMBAR REGION: ICD-10-CM

## 2020-05-21 PROCEDURE — 99214 OFFICE O/P EST MOD 30 MIN: CPT | Performed by: FAMILY MEDICINE

## 2020-05-21 RX ORDER — HYDROCODONE BITARTRATE AND ACETAMINOPHEN 5; 325 MG/1; MG/1
TABLET ORAL
COMMUNITY
Start: 2020-05-04 | End: 2020-05-29 | Stop reason: SDUPTHER

## 2020-05-21 RX ORDER — METHOCARBAMOL 750 MG/1
TABLET, FILM COATED ORAL
COMMUNITY
Start: 2020-05-09 | End: 2020-07-23 | Stop reason: SDUPTHER

## 2020-05-21 RX ORDER — FLUOXETINE HYDROCHLORIDE 40 MG/1
CAPSULE ORAL
COMMUNITY
Start: 2020-04-22 | End: 2020-05-21 | Stop reason: SDUPTHER

## 2020-05-21 RX ORDER — LISINOPRIL 40 MG/1
TABLET ORAL
COMMUNITY
Start: 2020-02-27

## 2020-05-21 RX ORDER — FLUOXETINE HYDROCHLORIDE 40 MG/1
40 CAPSULE ORAL DAILY
Qty: 90 CAPSULE | Refills: 1 | Status: SHIPPED | OUTPATIENT
Start: 2020-05-21 | End: 2020-08-27

## 2020-05-29 DIAGNOSIS — G89.4 CHRONIC PAIN DISORDER: ICD-10-CM

## 2020-05-29 DIAGNOSIS — M96.1 POSTLAMINECTOMY SYNDROME OF LUMBAR REGION: ICD-10-CM

## 2020-05-29 DIAGNOSIS — M79.7 FIBROMYALGIA: Primary | ICD-10-CM

## 2020-05-29 DIAGNOSIS — M15.9 GENERALIZED OSTEOARTHRITIS: ICD-10-CM

## 2020-05-29 RX ORDER — HYDROCODONE BITARTRATE AND ACETAMINOPHEN 5; 325 MG/1; MG/1
1 TABLET ORAL EVERY 12 HOURS PRN
Qty: 60 TABLET | Refills: 0 | Status: SHIPPED | OUTPATIENT
Start: 2020-05-29 | End: 2020-06-23

## 2020-06-01 ENCOUNTER — TELEPHONE (OUTPATIENT)
Dept: FAMILY MEDICINE CLINIC | Facility: CLINIC | Age: 65
End: 2020-06-01

## 2020-06-16 DIAGNOSIS — R60.0 LOCALIZED EDEMA: ICD-10-CM

## 2020-06-16 DIAGNOSIS — I63.9 CEREBROVASCULAR ACCIDENT (CVA), UNSPECIFIED MECHANISM (HCC): ICD-10-CM

## 2020-06-16 DIAGNOSIS — R53.1 RIGHT SIDED WEAKNESS: ICD-10-CM

## 2020-06-16 DIAGNOSIS — E07.9 DISEASE OF THYROID GLAND: ICD-10-CM

## 2020-06-16 RX ORDER — TRIAMTERENE AND HYDROCHLOROTHIAZIDE 37.5; 25 MG/1; MG/1
1 CAPSULE ORAL EVERY MORNING
Qty: 90 CAPSULE | Refills: 0 | Status: SHIPPED | OUTPATIENT
Start: 2020-06-16 | End: 2020-08-27 | Stop reason: SDUPTHER

## 2020-06-16 RX ORDER — LEVOTHYROXINE SODIUM 0.03 MG/1
TABLET ORAL
Qty: 90 TABLET | Refills: 0 | Status: SHIPPED | OUTPATIENT
Start: 2020-06-16 | End: 2020-07-27 | Stop reason: SDUPTHER

## 2020-06-16 RX ORDER — ATORVASTATIN CALCIUM 80 MG/1
TABLET, FILM COATED ORAL
Qty: 90 TABLET | Refills: 0 | Status: SHIPPED | OUTPATIENT
Start: 2020-06-16 | End: 2020-08-27 | Stop reason: SDUPTHER

## 2020-06-23 ENCOUNTER — OFFICE VISIT (OUTPATIENT)
Dept: FAMILY MEDICINE CLINIC | Facility: CLINIC | Age: 65
End: 2020-06-23
Payer: COMMERCIAL

## 2020-06-23 VITALS
DIASTOLIC BLOOD PRESSURE: 78 MMHG | HEART RATE: 123 BPM | TEMPERATURE: 94.8 F | BODY MASS INDEX: 33.81 KG/M2 | HEIGHT: 63 IN | SYSTOLIC BLOOD PRESSURE: 128 MMHG | WEIGHT: 190.8 LBS | OXYGEN SATURATION: 94 %

## 2020-06-23 DIAGNOSIS — M81.0 AGE-RELATED OSTEOPOROSIS WITHOUT CURRENT PATHOLOGICAL FRACTURE: ICD-10-CM

## 2020-06-23 DIAGNOSIS — Z72.0 TOBACCO USE: Chronic | ICD-10-CM

## 2020-06-23 DIAGNOSIS — N32.81 OAB (OVERACTIVE BLADDER): ICD-10-CM

## 2020-06-23 DIAGNOSIS — G89.4 CHRONIC PAIN DISORDER: ICD-10-CM

## 2020-06-23 DIAGNOSIS — Z76.89 ENCOUNTER TO ESTABLISH CARE: Primary | ICD-10-CM

## 2020-06-23 DIAGNOSIS — I10 BENIGN ESSENTIAL HTN: ICD-10-CM

## 2020-06-23 DIAGNOSIS — M15.9 GENERALIZED OSTEOARTHRITIS: ICD-10-CM

## 2020-06-23 DIAGNOSIS — F41.9 ANXIETY: ICD-10-CM

## 2020-06-23 DIAGNOSIS — E07.9 DISEASE OF THYROID GLAND: ICD-10-CM

## 2020-06-23 DIAGNOSIS — I63.312 CEREBROVASCULAR ACCIDENT (CVA) DUE TO THROMBOSIS OF LEFT MIDDLE CEREBRAL ARTERY (HCC): ICD-10-CM

## 2020-06-23 PROCEDURE — 4004F PT TOBACCO SCREEN RCVD TLK: CPT | Performed by: FAMILY MEDICINE

## 2020-06-23 PROCEDURE — 3074F SYST BP LT 130 MM HG: CPT | Performed by: FAMILY MEDICINE

## 2020-06-23 PROCEDURE — 99214 OFFICE O/P EST MOD 30 MIN: CPT | Performed by: FAMILY MEDICINE

## 2020-06-23 PROCEDURE — 3078F DIAST BP <80 MM HG: CPT | Performed by: FAMILY MEDICINE

## 2020-06-23 PROCEDURE — 3008F BODY MASS INDEX DOCD: CPT | Performed by: FAMILY MEDICINE

## 2020-06-23 RX ORDER — DIAZEPAM 5 MG/1
5 TABLET ORAL EVERY 12 HOURS PRN
Qty: 60 TABLET | Refills: 0 | Status: SHIPPED | OUTPATIENT
Start: 2020-06-23 | End: 2020-07-23 | Stop reason: SDUPTHER

## 2020-06-23 RX ORDER — OXYCODONE HYDROCHLORIDE AND ACETAMINOPHEN 5; 325 MG/1; MG/1
1 TABLET ORAL EVERY 6 HOURS PRN
Qty: 120 TABLET | Refills: 0 | Status: SHIPPED | OUTPATIENT
Start: 2020-06-23 | End: 2020-07-24 | Stop reason: SDUPTHER

## 2020-06-23 RX ORDER — BUPROPION HYDROCHLORIDE 150 MG/1
150 TABLET, EXTENDED RELEASE ORAL DAILY
Qty: 90 TABLET | Refills: 0 | Status: SHIPPED | OUTPATIENT
Start: 2020-06-23 | End: 2020-09-28

## 2020-06-23 RX ORDER — OXYBUTYNIN CHLORIDE 5 MG/1
5 TABLET ORAL 3 TIMES DAILY
Qty: 270 TABLET | Refills: 3 | Status: SHIPPED | OUTPATIENT
Start: 2020-06-23

## 2020-07-09 DIAGNOSIS — F41.9 ANXIETY: ICD-10-CM

## 2020-07-09 DIAGNOSIS — G89.4 CHRONIC PAIN DISORDER: ICD-10-CM

## 2020-07-09 LAB
CREAT ?TM UR-SCNC: 91 UMOL/L
EXT MICROALBUMIN URINE RANDOM: 1.3
HBA1C MFR BLD HPLC: 5.6 %
MICROALBUMIN/CREAT UR: 14 MG/G{CREAT}

## 2020-07-22 NOTE — PROGRESS NOTES
The patient's thyroid blood work still shows her to be sluggish  I currently have her listed is taking levothyroxine 50 mcg daily  Can you please confirm that this is her dose and how long has she been doing that  I probably will increase up to 88 mcg daily so we would need to send in a new prescription to her local pharmacy in the [facility]

## 2020-07-23 NOTE — PROGRESS NOTES
I spoke to Florecita, she is currently taking levothyroxine 50 mcg for the past few months  She uses [facility] at [facility] [id number], [phone]    Also needs refills on 3 other meds

## 2020-07-24 DIAGNOSIS — G89.4 CHRONIC PAIN DISORDER: ICD-10-CM

## 2020-07-24 RX ORDER — OXYCODONE HYDROCHLORIDE AND ACETAMINOPHEN 5; 325 MG/1; MG/1
1 TABLET ORAL EVERY 6 HOURS PRN
Qty: 120 TABLET | Refills: 0 | Status: SHIPPED | OUTPATIENT
Start: 2020-07-24 | End: 2020-08-24 | Stop reason: SDUPTHER

## 2020-07-24 RX ORDER — NALOXONE HYDROCHLORIDE 4 MG/.1ML
SPRAY NASAL
Qty: 1 EACH | Refills: 1 | Status: SHIPPED | OUTPATIENT
Start: 2020-07-24

## 2020-07-24 NOTE — TELEPHONE ENCOUNTER
[place] prescription drug monitoring program was checked and verified for refill  I did send over narcan as well which is recommended for her medication amount- this is to be used as needed for any respiratory depression from medication

## 2020-07-24 NOTE — TELEPHONE ENCOUNTER
Pt calling requesting oxyCODONE-acetaminophen (PERCOCET) 5-325 mg per tablet  PMED not reviewed, no access      Last visit- 6/23/20  Next visit-  Last refill- 6/23/20

## 2020-07-27 DIAGNOSIS — E07.9 DISEASE OF THYROID GLAND: ICD-10-CM

## 2020-07-27 RX ORDER — LEVOTHYROXINE SODIUM 88 UG/1
88 TABLET ORAL DAILY
Qty: 90 TABLET | Refills: 0 | Status: SHIPPED | OUTPATIENT
Start: 2020-07-27 | End: 2020-10-05 | Stop reason: SDUPTHER

## 2020-07-27 RX ORDER — OXYCODONE HYDROCHLORIDE AND ACETAMINOPHEN 5; 325 MG/1; MG/1
1 TABLET ORAL EVERY 6 HOURS PRN
Qty: 120 TABLET | Refills: 0 | Status: SHIPPED | OUTPATIENT
Start: 2020-07-27 | End: 2020-09-24 | Stop reason: SDUPTHER

## 2020-07-27 RX ORDER — DIAZEPAM 5 MG/1
5 TABLET ORAL EVERY 12 HOURS PRN
Qty: 60 TABLET | Refills: 0 | Status: SHIPPED | OUTPATIENT
Start: 2020-07-27 | End: 2020-08-24 | Stop reason: SDUPTHER

## 2020-07-27 RX ORDER — METHOCARBAMOL 750 MG/1
750 TABLET, FILM COATED ORAL EVERY 6 HOURS PRN
Qty: 90 TABLET | Refills: 2 | Status: SHIPPED | OUTPATIENT
Start: 2020-07-27 | End: 2020-08-24 | Stop reason: SDUPTHER

## 2020-08-13 DIAGNOSIS — E07.9 DISEASE OF THYROID GLAND: ICD-10-CM

## 2020-08-13 RX ORDER — LEVOTHYROXINE SODIUM 0.03 MG/1
TABLET ORAL
Qty: 90 TABLET | Refills: 0 | Status: SHIPPED | OUTPATIENT
Start: 2020-08-13 | End: 2020-10-05

## 2020-08-24 DIAGNOSIS — K21.9 GASTROESOPHAGEAL REFLUX DISEASE WITHOUT ESOPHAGITIS: ICD-10-CM

## 2020-08-24 DIAGNOSIS — G89.4 CHRONIC PAIN DISORDER: ICD-10-CM

## 2020-08-24 DIAGNOSIS — F41.9 ANXIETY: ICD-10-CM

## 2020-08-24 RX ORDER — OXYCODONE HYDROCHLORIDE AND ACETAMINOPHEN 5; 325 MG/1; MG/1
1 TABLET ORAL EVERY 6 HOURS PRN
Qty: 120 TABLET | Refills: 0 | Status: SHIPPED | OUTPATIENT
Start: 2020-08-24 | End: 2020-09-24

## 2020-08-24 RX ORDER — METHOCARBAMOL 750 MG/1
750 TABLET, FILM COATED ORAL EVERY 6 HOURS PRN
Qty: 90 TABLET | Refills: 2 | Status: SHIPPED | OUTPATIENT
Start: 2020-08-24 | End: 2020-10-20

## 2020-08-24 RX ORDER — OXYCODONE HYDROCHLORIDE AND ACETAMINOPHEN 5; 325 MG/1; MG/1
1 TABLET ORAL EVERY 6 HOURS PRN
Qty: 120 TABLET | Refills: 0 | Status: CANCELLED | OUTPATIENT
Start: 2020-08-24

## 2020-08-24 RX ORDER — DIAZEPAM 5 MG/1
5 TABLET ORAL EVERY 12 HOURS PRN
Qty: 60 TABLET | Refills: 0 | Status: SHIPPED | OUTPATIENT
Start: 2020-08-24 | End: 2020-09-24 | Stop reason: SDUPTHER

## 2020-08-24 RX ORDER — PANTOPRAZOLE SODIUM 40 MG/1
40 TABLET, DELAYED RELEASE ORAL 2 TIMES DAILY
Qty: 180 TABLET | Refills: 3 | Status: SHIPPED | OUTPATIENT
Start: 2020-08-24

## 2020-08-26 ENCOUNTER — TELEPHONE (OUTPATIENT)
Dept: FAMILY MEDICINE CLINIC | Facility: CLINIC | Age: 65
End: 2020-08-26

## 2020-08-26 NOTE — TELEPHONE ENCOUNTER
----- Message from Disha Philippe DO sent at 1/73/9011  4:48 PM EDT -----  Regarding: controlled substance contract   Can we send patient a controlled substance contract out to her address near [place]    Call her and let her know that we need that signed at her convenience so we can have the chart updated with regards to her controlled substance prescription

## 2020-08-27 DIAGNOSIS — I63.9 CEREBROVASCULAR ACCIDENT (CVA), UNSPECIFIED MECHANISM (HCC): ICD-10-CM

## 2020-08-27 DIAGNOSIS — M79.7 FIBROMYALGIA: ICD-10-CM

## 2020-08-27 DIAGNOSIS — R60.0 LOCALIZED EDEMA: ICD-10-CM

## 2020-08-27 DIAGNOSIS — F41.9 ANXIETY: ICD-10-CM

## 2020-08-27 DIAGNOSIS — R53.1 RIGHT SIDED WEAKNESS: ICD-10-CM

## 2020-08-27 RX ORDER — TRIAMTERENE AND HYDROCHLOROTHIAZIDE 37.5; 25 MG/1; MG/1
1 CAPSULE ORAL EVERY MORNING
Qty: 90 CAPSULE | Refills: 1 | Status: SHIPPED | OUTPATIENT
Start: 2020-08-27 | End: 2021-02-07

## 2020-08-27 RX ORDER — FLUOXETINE HYDROCHLORIDE 40 MG/1
CAPSULE ORAL
Qty: 90 CAPSULE | Refills: 1 | Status: SHIPPED | OUTPATIENT
Start: 2020-08-27 | End: 2021-02-07

## 2020-08-27 RX ORDER — ATORVASTATIN CALCIUM 80 MG/1
80 TABLET, FILM COATED ORAL EVERY EVENING
Qty: 90 TABLET | Refills: 1 | Status: SHIPPED | OUTPATIENT
Start: 2020-08-27 | End: 2021-02-07

## 2020-09-24 DIAGNOSIS — G89.4 CHRONIC PAIN DISORDER: ICD-10-CM

## 2020-09-24 DIAGNOSIS — F41.9 ANXIETY: ICD-10-CM

## 2020-09-24 RX ORDER — OXYCODONE HYDROCHLORIDE AND ACETAMINOPHEN 5; 325 MG/1; MG/1
1 TABLET ORAL EVERY 6 HOURS PRN
Qty: 120 TABLET | Refills: 0 | Status: SHIPPED | OUTPATIENT
Start: 2020-09-24 | End: 2020-10-21 | Stop reason: SDUPTHER

## 2020-09-24 RX ORDER — DIAZEPAM 5 MG/1
5 TABLET ORAL EVERY 12 HOURS PRN
Qty: 60 TABLET | Refills: 0 | Status: SHIPPED | OUTPATIENT
Start: 2020-09-24 | End: 2020-10-21 | Stop reason: SDUPTHER

## 2020-09-24 NOTE — TELEPHONE ENCOUNTER
Pt called the office 2 days ago requesting refills she is out of the medications       PDMP checked:  08/24/2020  2  08/24/2020  OXYCODONE-ACETAMINOPHEN 5-325  120 0  30  JU PRY  [id number]  KAR ([id number])  0  30 0 MME  Comm Ins  [place]    08/24/2020  2  08/24/2020  DIAZEPAM 5 MG TABLET  60 0  30  JU PRY

## 2020-09-28 DIAGNOSIS — Z72.0 TOBACCO USE: Chronic | ICD-10-CM

## 2020-09-28 RX ORDER — BUPROPION HYDROCHLORIDE 150 MG/1
TABLET, EXTENDED RELEASE ORAL
Qty: 90 TABLET | Refills: 1 | Status: SHIPPED | OUTPATIENT
Start: 2020-09-28 | End: 2021-04-04

## 2020-10-05 ENCOUNTER — OFFICE VISIT (OUTPATIENT)
Dept: FAMILY MEDICINE CLINIC | Facility: CLINIC | Age: 65
End: 2020-10-05
Payer: COMMERCIAL

## 2020-10-05 VITALS
HEIGHT: 65 IN | HEART RATE: 95 BPM | DIASTOLIC BLOOD PRESSURE: 62 MMHG | OXYGEN SATURATION: 97 % | BODY MASS INDEX: 31.16 KG/M2 | WEIGHT: 187 LBS | SYSTOLIC BLOOD PRESSURE: 122 MMHG | TEMPERATURE: 97.4 F

## 2020-10-05 DIAGNOSIS — K91.2 POSTSURGICAL MALABSORPTION: ICD-10-CM

## 2020-10-05 DIAGNOSIS — M25.571 ACUTE RIGHT ANKLE PAIN: ICD-10-CM

## 2020-10-05 DIAGNOSIS — K31.84 GASTROPARESIS: ICD-10-CM
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
## 2020-10-05 DIAGNOSIS — E07.9 DISEASE OF THYROID GLAND: ICD-10-CM

## 2020-10-05 DIAGNOSIS — Z23 ENCOUNTER FOR IMMUNIZATION: ICD-10-CM

## 2020-10-05 DIAGNOSIS — Z00.00 MEDICARE ANNUAL WELLNESS VISIT, SUBSEQUENT: ICD-10-CM

## 2020-10-05 PROCEDURE — 3078F DIAST BP <80 MM HG: CPT | Performed by: FAMILY MEDICINE

## 2020-10-05 PROCEDURE — 3725F SCREEN DEPRESSION PERFORMED: CPT | Performed by: FAMILY MEDICINE

## 2020-10-05 PROCEDURE — 90732 PPSV23 VACC 2 YRS+ SUBQ/IM: CPT

## 2020-10-05 PROCEDURE — G0009 ADMIN PNEUMOCOCCAL VACCINE: HCPCS

## 2020-10-05 PROCEDURE — 4004F PT TOBACCO SCREEN RCVD TLK: CPT | Performed by: FAMILY MEDICINE

## 2020-10-05 PROCEDURE — 4040F PNEUMOC VAC/ADMIN/RCVD: CPT | Performed by: FAMILY MEDICINE

## 2020-10-05 PROCEDURE — G0439 PPPS, SUBSEQ VISIT: HCPCS | Performed by: FAMILY MEDICINE

## 2020-10-05 PROCEDURE — 90662 IIV NO PRSV INCREASED AG IM: CPT

## 2020-10-05 PROCEDURE — G0008 ADMIN INFLUENZA VIRUS VAC: HCPCS

## 2020-10-05 RX ORDER — LEVOTHYROXINE SODIUM 88 UG/1
88 TABLET ORAL DAILY
Qty: 90 TABLET | Refills: 1 | Status: SHIPPED | OUTPATIENT
Start: 2020-10-05 | End: 2020-10-22

## 2020-10-06 ENCOUNTER — APPOINTMENT (OUTPATIENT)
Dept: RADIOLOGY | Facility: CLINIC | Age: 65
End: 2020-10-06
Payer: COMMERCIAL
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
## 2020-10-06 DIAGNOSIS — M25.571 ACUTE RIGHT ANKLE PAIN: ICD-10-CM

## 2020-10-06 PROCEDURE — 73630 X-RAY EXAM OF FOOT: CPT

## 2020-10-06 PROCEDURE — 73610 X-RAY EXAM OF ANKLE: CPT

## 2020-10-20 DIAGNOSIS — G89.4 CHRONIC PAIN DISORDER: ICD-10-CM

## 2020-10-20 RX ORDER — METHOCARBAMOL 750 MG/1
TABLET, FILM COATED ORAL
Qty: 90 TABLET | Refills: 2 | Status: SHIPPED | OUTPATIENT
Start: 2020-10-20 | End: 2020-12-26

## 2020-10-21 DIAGNOSIS — F41.9 ANXIETY: ICD-10-CM

## 2020-10-21 DIAGNOSIS — G89.4 CHRONIC PAIN DISORDER: ICD-10-CM

## 2020-10-21 RX ORDER — OXYCODONE HYDROCHLORIDE AND ACETAMINOPHEN 5; 325 MG/1; MG/1
1 TABLET ORAL EVERY 6 HOURS PRN
Qty: 120 TABLET | Refills: 0 | Status: SHIPPED | OUTPATIENT
Start: 2020-10-21 | End: 2020-11-18 | Stop reason: SDUPTHER

## 2020-10-21 RX ORDER — DIAZEPAM 5 MG/1
5 TABLET ORAL EVERY 12 HOURS PRN
Qty: 60 TABLET | Refills: 0 | Status: SHIPPED | OUTPATIENT
Start: 2020-10-21 | End: 2020-11-18 | Stop reason: SDUPTHER

## 2020-10-22 DIAGNOSIS — E07.9 DISEASE OF THYROID GLAND: ICD-10-CM

## 2020-10-22 RX ORDER — LEVOTHYROXINE SODIUM 88 UG/1
TABLET ORAL
Qty: 90 TABLET | Refills: 1 | Status: SHIPPED | OUTPATIENT
Start: 2020-10-22 | End: 2021-04-03

## 2020-10-26 ENCOUNTER — TELEPHONE (OUTPATIENT)
Dept: FAMILY MEDICINE CLINIC | Facility: CLINIC | Age: 65
End: 2020-10-26

## 2020-10-26 DIAGNOSIS — M79.7 FIBROMYALGIA: ICD-10-CM

## 2020-10-26 DIAGNOSIS — M15.9 GENERALIZED OSTEOARTHRITIS: ICD-10-CM

## 2020-10-26 DIAGNOSIS — G25.81 RESTLESS LEGS SYNDROME: ICD-10-CM

## 2020-10-26 RX ORDER — GABAPENTIN 600 MG/1
600 TABLET ORAL
Qty: 450 TABLET | Refills: 0 | Status: SHIPPED | OUTPATIENT
Start: 2020-10-26 | End: 2021-01-04 | Stop reason: SDUPTHER

## 2020-11-12 ENCOUNTER — TELEPHONE (OUTPATIENT)
Dept: FAMILY MEDICINE CLINIC | Facility: CLINIC | Age: 65
End: 2020-11-12

## 2020-11-13 DIAGNOSIS — G89.4 CHRONIC PAIN DISORDER: ICD-10-CM

## 2020-11-13 DIAGNOSIS — M15.9 GENERALIZED OSTEOARTHRITIS: Primary | ICD-10-CM

## 2020-11-13 DIAGNOSIS — M96.1 POSTLAMINECTOMY SYNDROME OF LUMBAR REGION: ICD-10-CM

## 2020-11-13 RX ORDER — TRAMADOL HYDROCHLORIDE 50 MG/1
50 TABLET ORAL EVERY 6 HOURS PRN
Qty: 28 TABLET | Refills: 0 | Status: SHIPPED | OUTPATIENT
Start: 2020-11-13

## 2020-11-18 DIAGNOSIS — G89.4 CHRONIC PAIN DISORDER: ICD-10-CM

## 2020-11-18 DIAGNOSIS — F41.9 ANXIETY: ICD-10-CM

## 2020-11-18 RX ORDER — DIAZEPAM 5 MG/1
5 TABLET ORAL EVERY 12 HOURS PRN
Qty: 60 TABLET | Refills: 0 | Status: SHIPPED | OUTPATIENT
Start: 2020-11-18 | End: 2020-12-18 | Stop reason: SDUPTHER

## 2020-11-18 RX ORDER — OXYCODONE HYDROCHLORIDE AND ACETAMINOPHEN 5; 325 MG/1; MG/1
1 TABLET ORAL EVERY 6 HOURS PRN
Qty: 120 TABLET | Refills: 0 | Status: SHIPPED | OUTPATIENT
Start: 2020-11-18 | End: 2020-12-18 | Stop reason: SDUPTHER

## 2020-12-18 DIAGNOSIS — F41.9 ANXIETY: ICD-10-CM

## 2020-12-18 DIAGNOSIS — G89.4 CHRONIC PAIN DISORDER: ICD-10-CM

## 2020-12-18 RX ORDER — OXYCODONE HYDROCHLORIDE AND ACETAMINOPHEN 5; 325 MG/1; MG/1
1 TABLET ORAL EVERY 6 HOURS PRN
Qty: 120 TABLET | Refills: 0 | Status: SHIPPED | OUTPATIENT
Start: 2020-12-18 | End: 2021-01-14 | Stop reason: SDUPTHER

## 2020-12-18 RX ORDER — DIAZEPAM 5 MG/1
5 TABLET ORAL EVERY 12 HOURS PRN
Qty: 60 TABLET | Refills: 0 | Status: SHIPPED | OUTPATIENT
Start: 2020-12-18 | End: 2021-01-14 | Stop reason: SDUPTHER

## 2020-12-25 DIAGNOSIS — G89.4 CHRONIC PAIN DISORDER: ICD-10-CM

## 2020-12-26 RX ORDER — METHOCARBAMOL 750 MG/1
TABLET, FILM COATED ORAL
Qty: 90 TABLET | Refills: 2 | Status: SHIPPED | OUTPATIENT
Start: 2020-12-26 | End: 2021-01-14 | Stop reason: SDUPTHER

## 2021-01-04 DIAGNOSIS — G25.81 RESTLESS LEGS SYNDROME: ICD-10-CM

## 2021-01-04 DIAGNOSIS — M15.9 GENERALIZED OSTEOARTHRITIS: ICD-10-CM

## 2021-01-04 DIAGNOSIS — M79.7 FIBROMYALGIA: ICD-10-CM

## 2021-01-04 RX ORDER — GABAPENTIN 600 MG/1
600 TABLET ORAL
Qty: 540 TABLET | Refills: 0 | Status: SHIPPED | OUTPATIENT
Start: 2021-01-04 | End: 2021-01-14

## 2021-01-14 DIAGNOSIS — M79.7 FIBROMYALGIA: ICD-10-CM

## 2021-01-14 DIAGNOSIS — M15.9 GENERALIZED OSTEOARTHRITIS: ICD-10-CM

## 2021-01-14 DIAGNOSIS — G89.4 CHRONIC PAIN DISORDER: ICD-10-CM

## 2021-01-14 DIAGNOSIS — G25.81 RESTLESS LEGS SYNDROME: ICD-10-CM

## 2021-01-14 DIAGNOSIS — F41.9 ANXIETY: ICD-10-CM

## 2021-01-14 RX ORDER — GABAPENTIN 600 MG/1
TABLET ORAL
Qty: 540 TABLET | Refills: 0 | Status: SHIPPED | OUTPATIENT
Start: 2021-01-14

## 2021-01-14 RX ORDER — METHOCARBAMOL 750 MG/1
750 TABLET, FILM COATED ORAL EVERY 6 HOURS PRN
Qty: 90 TABLET | Refills: 2 | Status: SHIPPED | OUTPATIENT
Start: 2021-01-14 | End: 2021-01-15 | Stop reason: SDUPTHER

## 2021-01-14 RX ORDER — DIAZEPAM 5 MG/1
5 TABLET ORAL EVERY 12 HOURS PRN
Qty: 60 TABLET | Refills: 0 | Status: SHIPPED | OUTPATIENT
Start: 2021-01-14 | End: 2021-02-10 | Stop reason: SDUPTHER

## 2021-01-14 RX ORDER — OXYCODONE HYDROCHLORIDE AND ACETAMINOPHEN 5; 325 MG/1; MG/1
1 TABLET ORAL EVERY 6 HOURS PRN
Qty: 120 TABLET | Refills: 0 | Status: SHIPPED | OUTPATIENT
Start: 2021-01-14 | End: 2021-02-10 | Stop reason: SDUPTHER

## 2021-01-15 DIAGNOSIS — G89.4 CHRONIC PAIN DISORDER: ICD-10-CM

## 2021-01-15 RX ORDER — METHOCARBAMOL 750 MG/1
750 TABLET, FILM COATED ORAL EVERY 6 HOURS PRN
Qty: 360 TABLET | Refills: 0 | Status: SHIPPED | OUTPATIENT
Start: 2021-01-15 | End: 2021-04-21 | Stop reason: SDUPTHER

## 2021-02-06 DIAGNOSIS — R53.1 RIGHT SIDED WEAKNESS: ICD-10-CM

## 2021-02-06 DIAGNOSIS — R60.0 LOCALIZED EDEMA: ICD-10-CM

## 2021-02-06 DIAGNOSIS — I63.9 CEREBROVASCULAR ACCIDENT (CVA), UNSPECIFIED MECHANISM (HCC): ICD-10-CM

## 2021-02-06 DIAGNOSIS — F41.9 ANXIETY: ICD-10-CM

## 2021-02-06 DIAGNOSIS — M79.7 FIBROMYALGIA: ICD-10-CM

## 2021-02-07 RX ORDER — FLUOXETINE HYDROCHLORIDE 40 MG/1
CAPSULE ORAL
Qty: 90 CAPSULE | Refills: 1 | Status: SHIPPED | OUTPATIENT
Start: 2021-02-07

## 2021-02-07 RX ORDER — TRIAMTERENE AND HYDROCHLOROTHIAZIDE 37.5; 25 MG/1; MG/1
1 CAPSULE ORAL EVERY MORNING
Qty: 90 CAPSULE | Refills: 1 | Status: SHIPPED | OUTPATIENT
Start: 2021-02-07 | End: 2021-05-17

## 2021-02-07 RX ORDER — ATORVASTATIN CALCIUM 80 MG/1
TABLET, FILM COATED ORAL
Qty: 90 TABLET | Refills: 1 | Status: SHIPPED | OUTPATIENT
Start: 2021-02-07

## 2021-02-10 DIAGNOSIS — F41.9 ANXIETY: ICD-10-CM

## 2021-02-10 DIAGNOSIS — S32.010S COMPRESSION FRACTURE OF L1 VERTEBRA, SEQUELA: Primary | ICD-10-CM

## 2021-02-10 DIAGNOSIS — G62.9 NEUROPATHY: ICD-10-CM

## 2021-02-10 DIAGNOSIS — G89.4 CHRONIC PAIN DISORDER: ICD-10-CM

## 2021-02-10 RX ORDER — OXYCODONE HYDROCHLORIDE AND ACETAMINOPHEN 5; 325 MG/1; MG/1
1 TABLET ORAL EVERY 6 HOURS PRN
Qty: 120 TABLET | Refills: 0 | Status: SHIPPED | OUTPATIENT
Start: 2021-02-10 | End: 2021-03-10 | Stop reason: SDUPTHER

## 2021-02-10 RX ORDER — DIAZEPAM 5 MG/1
5 TABLET ORAL EVERY 12 HOURS PRN
Qty: 60 TABLET | Refills: 0 | Status: SHIPPED | OUTPATIENT
Start: 2021-02-10 | End: 2021-03-10 | Stop reason: SDUPTHER

## 2021-03-10 DIAGNOSIS — G62.9 NEUROPATHY: ICD-10-CM

## 2021-03-10 DIAGNOSIS — F41.9 ANXIETY: ICD-10-CM

## 2021-03-10 DIAGNOSIS — G89.4 CHRONIC PAIN DISORDER: ICD-10-CM

## 2021-03-10 DIAGNOSIS — S32.010S COMPRESSION FRACTURE OF L1 VERTEBRA, SEQUELA: ICD-10-CM

## 2021-03-10 NOTE — TELEPHONE ENCOUNTER
Patient called requesting refill for Diazepam 5 mg and Oxycodone-acetaminophen 5-325 mg sent to [address]

## 2021-03-11 RX ORDER — OXYCODONE HYDROCHLORIDE AND ACETAMINOPHEN 5; 325 MG/1; MG/1
1 TABLET ORAL EVERY 6 HOURS PRN
Qty: 120 TABLET | Refills: 0 | Status: SHIPPED | OUTPATIENT
Start: 2021-03-11 | End: 2021-04-06 | Stop reason: SDUPTHER

## 2021-03-11 RX ORDER — DIAZEPAM 5 MG/1
5 TABLET ORAL EVERY 12 HOURS PRN
Qty: 60 TABLET | Refills: 0 | Status: SHIPPED | OUTPATIENT
Start: 2021-03-11 | End: 2021-04-06 | Stop reason: SDUPTHER

## 2021-04-02 DIAGNOSIS — E07.9 DISEASE OF THYROID GLAND: ICD-10-CM

## 2021-04-03 RX ORDER — LEVOTHYROXINE SODIUM 88 UG/1
TABLET ORAL
Qty: 90 TABLET | Refills: 1 | Status: SHIPPED | OUTPATIENT
Start: 2021-04-03

## 2021-04-04 DIAGNOSIS — Z72.0 TOBACCO USE: Chronic | ICD-10-CM

## 2021-04-04 RX ORDER — BUPROPION HYDROCHLORIDE 150 MG/1
TABLET, EXTENDED RELEASE ORAL
Qty: 90 TABLET | Refills: 1 | Status: SHIPPED | OUTPATIENT
Start: 2021-04-04

## 2021-04-06 DIAGNOSIS — G89.4 CHRONIC PAIN DISORDER: ICD-10-CM

## 2021-04-06 DIAGNOSIS — S32.010S COMPRESSION FRACTURE OF L1 VERTEBRA, SEQUELA: ICD-10-CM

## 2021-04-06 DIAGNOSIS — G62.9 NEUROPATHY: ICD-10-CM

## 2021-04-06 DIAGNOSIS — F41.9 ANXIETY: ICD-10-CM

## 2021-04-06 NOTE — TELEPHONE ENCOUNTER
Patient called today for refills of the oxycodone-acetaminophen and the diazepam  Please send to the [facility] on [address]  Thank you

## 2021-04-07 NOTE — TELEPHONE ENCOUNTER
Not due for refill until April 11th which is Sunday so I will refill on Friday April 9th    Please send this back to me

## 2021-04-08 RX ORDER — DIAZEPAM 5 MG/1
5 TABLET ORAL EVERY 12 HOURS PRN
Qty: 60 TABLET | Refills: 0 | Status: SHIPPED | OUTPATIENT
Start: 2021-04-08 | End: 2021-05-05 | Stop reason: SDUPTHER

## 2021-04-08 RX ORDER — OXYCODONE HYDROCHLORIDE AND ACETAMINOPHEN 5; 325 MG/1; MG/1
1 TABLET ORAL EVERY 6 HOURS PRN
Qty: 120 TABLET | Refills: 0 | Status: SHIPPED | OUTPATIENT
Start: 2021-04-08 | End: 2021-05-05 | Stop reason: SDUPTHER

## 2021-04-21 DIAGNOSIS — G89.4 CHRONIC PAIN DISORDER: ICD-10-CM

## 2021-04-21 RX ORDER — METHOCARBAMOL 750 MG/1
TABLET, FILM COATED ORAL
Qty: 90 TABLET | Refills: 0 | Status: SHIPPED | OUTPATIENT
Start: 2021-04-21

## 2021-05-05 DIAGNOSIS — G62.9 NEUROPATHY: ICD-10-CM

## 2021-05-05 DIAGNOSIS — G89.4 CHRONIC PAIN DISORDER: ICD-10-CM

## 2021-05-05 DIAGNOSIS — F41.9 ANXIETY: ICD-10-CM

## 2021-05-05 DIAGNOSIS — S32.010S COMPRESSION FRACTURE OF L1 VERTEBRA, SEQUELA: ICD-10-CM

## 2021-05-05 RX ORDER — DIAZEPAM 5 MG/1
5 TABLET ORAL EVERY 12 HOURS PRN
Qty: 60 TABLET | Refills: 0 | Status: SHIPPED | OUTPATIENT
Start: 2021-05-05

## 2021-05-05 RX ORDER — OXYCODONE HYDROCHLORIDE AND ACETAMINOPHEN 5; 325 MG/1; MG/1
1 TABLET ORAL EVERY 6 HOURS PRN
Qty: 120 TABLET | Refills: 0 | Status: SHIPPED | OUTPATIENT
Start: 2021-05-05

## 2021-05-17 DIAGNOSIS — R60.0 LOCALIZED EDEMA: ICD-10-CM

## 2021-05-17 RX ORDER — TRIAMTERENE AND HYDROCHLOROTHIAZIDE 37.5; 25 MG/1; MG/1
1 CAPSULE ORAL EVERY MORNING
Qty: 90 CAPSULE | Refills: 1 | Status: SHIPPED | OUTPATIENT
Start: 2021-05-17

## 2021-05-18 ENCOUNTER — TELEPHONE (OUTPATIENT)
Dept: FAMILY MEDICINE CLINIC | Facility: CLINIC | Age: 66
End: 2021-05-18

## 2021-05-18 NOTE — TELEPHONE ENCOUNTER
----- Message from Jon Calvert DO sent at 4/32/6798  7:49 AM EDT -----  Regarding: Transfer primary care including scheduled medication  I just reviewed the chart after refilling medication and saw that patient was admitted and evaluated for atrial fibrillation with rapid ventricular response and has establish with a primary care provider in the [place] area where she currently is residing  It also looks like she has cardiology follow-up  At this point she is going to have to transfer her prescriptions for her controlled substances to that physician in [place]  I certainly will be happy to re-establish care here if she does moved back to the [facility] area  Please wish her well for me and stay healthy

## 2021-05-18 NOTE — TELEPHONE ENCOUNTER
I spoke to the patient, she has established a new PCP in the [place] area and also a cardiologist   She wishes she was moving back to the [facility]  She sends love and well wishes

## 2021-06-08 DIAGNOSIS — N32.81 OAB (OVERACTIVE BLADDER): ICD-10-CM

## 2021-06-08 RX ORDER — OXYBUTYNIN CHLORIDE 5 MG/1
TABLET ORAL
Qty: 270 TABLET | Refills: 3 | OUTPATIENT
Start: 2021-06-08

## 2022-02-25 ENCOUNTER — TELEPHONE (OUTPATIENT)
Dept: FAMILY MEDICINE CLINIC | Facility: CLINIC | Age: 67
End: 2022-02-25

## 2023-12-28 ENCOUNTER — OFFICE VISIT (OUTPATIENT)
Dept: URGENT CARE | Facility: CLINIC | Age: 68
End: 2023-12-28
Payer: COMMERCIAL

## 2023-12-28 VITALS
OXYGEN SATURATION: 94 % | TEMPERATURE: 97.7 F | SYSTOLIC BLOOD PRESSURE: 126 MMHG | RESPIRATION RATE: 20 BRPM | DIASTOLIC BLOOD PRESSURE: 82 MMHG | BODY MASS INDEX: 20.8 KG/M2 | WEIGHT: 125 LBS | HEART RATE: 125 BPM

## 2023-12-28 DIAGNOSIS — R05.1 ACUTE COUGH: Primary | ICD-10-CM

## 2023-12-28 DIAGNOSIS — B34.9 VIRAL ILLNESS: ICD-10-CM

## 2023-12-28 LAB
SARS-COV-2 AG UPPER RESP QL IA: NEGATIVE
VALID CONTROL: NORMAL

## 2023-12-28 PROCEDURE — G0463 HOSPITAL OUTPT CLINIC VISIT: HCPCS | Performed by: NURSE PRACTITIONER

## 2023-12-28 PROCEDURE — 87811 SARS-COV-2 COVID19 W/OPTIC: CPT | Performed by: NURSE PRACTITIONER

## 2023-12-28 PROCEDURE — 99213 OFFICE O/P EST LOW 20 MIN: CPT | Performed by: NURSE PRACTITIONER

## 2023-12-28 RX ORDER — PREDNISONE 20 MG/1
20 TABLET ORAL 2 TIMES DAILY WITH MEALS
Qty: 10 TABLET | Refills: 0 | Status: SHIPPED | OUTPATIENT
Start: 2023-12-28 | End: 2024-01-02

## 2023-12-28 NOTE — PROGRESS NOTES
[facility]        NAME: Flor Chou is a 68 y.o. female  : 1955    MRN: [id number]  DATE: 2023  TIME: 2:24 PM    Assessment and Plan   Acute cough [R05.1]  1. Acute cough  Poct Covid 19 Rapid Antigen Test    predniSONE 20 mg tablet      2. Viral illness          Rapid COVID in office is negative.  Will start course of prednisone for viral URI with croup-like cough per .  Patient agreement with plan and will follow-up with PCP upon arrival back home.    Patient Instructions     Follow up with PCP in 3-5 days.  Proceed to  ER if symptoms worsen.    Chief Complaint     Chief Complaint   Patient presents with    Cough     Pt C/O sinus congestion with a cough. Pt reports dark green mucus post nasal drainage. Pt was around a covid positive person over the weekend but has tested covid negative with a home test.          History of Present Illness   Flor Chou presents to the clinic c/o    Cough (Pt C/O sinus congestion with a cough. Pt reports dark green mucus post nasal drainage. Pt was around a covid positive person over the weekend but has tested covid negative with a home test. )  COVID test at home was done 2 days ago the onset of her symptoms.          Review of Systems   Review of Systems   All other systems reviewed and are negative.        Current Medications     Long-Term Medications   Medication Sig Dispense Refill    amLODIPine (NORVASC) 2.5 mg tablet TAKE 1 TABLET BY MOUTH DAILY 90 tablet 3    atorvastatin (LIPITOR) 80 mg tablet TAKE 1 TABLET(80 MG) BY MOUTH EVERY EVENING 90 tablet 1    buPROPion (WELLBUTRIN SR) 150 mg 12 hr tablet TAKE 1 TABLET(150 MG) BY MOUTH DAILY 90 tablet 1    diazepam (VALIUM) 5 mg tablet Take 1 tablet (5 mg total) by mouth every 12 (twelve) hours as needed for anxiety 60 tablet 0    FLUoxetine (PROzac) 40 MG capsule TAKE 1 CAPSULE(40 MG) BY MOUTH DAILY 90 capsule 1    gabapentin (NEURONTIN) 600 MG tablet TAKE 1 TABLET(600 MG) BY  MOUTH 6 TIMES A  tablet 0    hydrochlorothiazide (HYDRODIURIL) 12.5 mg tablet Take 12.5 mg by mouth daily      levothyroxine 88 mcg tablet TAKE 1 TABLET(88 MCG) BY MOUTH DAILY 90 tablet 1    lisinopril (ZESTRIL) 40 mg tablet TK 1 T PO  QD      methocarbamol (ROBAXIN) 750 mg tablet TAKE 1 TABLET(750 MG) BY MOUTH EVERY 6 HOURS AS NEEDED FOR MUSCLE SPASMS 90 tablet 0    metoprolol succinate (TOPROL-XL) 25 mg 24 hr tablet TAKE 1 TABLET(25 MG) BY MOUTH DAILY 90 tablet 0    naloxone (NARCAN) 4 mg/0.1 mL nasal spray Administer 1 spray into a nostril. If breathing does not return to normal or if breathing difficulty resumes after 2-3 minutes, give another dose in the other nostril using a new spray. 1 each 1    oxybutynin (DITROPAN) 5 mg tablet Take 1 tablet (5 mg total) by mouth 3 (three) times a day 270 tablet 3    pantoprazole (PROTONIX) 40 mg tablet Take 1 tablet (40 mg total) by mouth 2 (two) times a day 180 tablet 3    triamterene-hydrochlorothiazide (DYAZIDE) 37.5-25 mg per capsule TAKE 1 CAPSULE BY MOUTH EVERY MORNING 90 capsule 1       Current Allergies     Allergies as of 12/28/2023 - Reviewed 12/28/2023   Allergen Reaction Noted    Codeine GI Intolerance 08/03/2016    Morphine GI Intolerance 08/03/2016    Cortisone  08/02/2016    Hydrocortisone Fever 08/09/2017    Other  05/15/2017    Penicillins  08/02/2016            The following portions of the patient's history were reviewed and updated as appropriate: allergies, current medications, past family history, past medical history, past social history, past surgical history and problem list.    Objective   /82 (BP Location: Left arm, Patient Position: Sitting, Cuff Size: Standard)   Pulse (!) 125   Temp 97.7 °F (36.5 °C) (Tympanic)   Resp 20   Wt 56.7 kg (125 lb)   SpO2 94%   BMI 20.80 kg/m²        Physical Exam     Physical Exam  Vitals and nursing note reviewed.   Constitutional:       Appearance: Normal appearance. She is well-developed.    HENT:      Head: Normocephalic and atraumatic.      Right Ear: Hearing, tympanic membrane, ear canal and external ear normal.      Left Ear: Hearing, tympanic membrane, ear canal and external ear normal.      Nose: Mucosal edema and congestion present.      Right Sinus: No maxillary sinus tenderness or frontal sinus tenderness.      Left Sinus: No maxillary sinus tenderness or frontal sinus tenderness.      Mouth/Throat:      Lips: Pink.      Mouth: Mucous membranes are moist.      Pharynx: Oropharynx is clear.   Eyes:      General: Lids are normal.      Conjunctiva/sclera: Conjunctivae normal.      Pupils: Pupils are equal, round, and reactive to light.   Cardiovascular:      Rate and Rhythm: Normal rate and regular rhythm.      Heart sounds: Normal heart sounds, S1 normal and S2 normal.   Pulmonary:      Effort: Pulmonary effort is normal.      Breath sounds: Normal breath sounds.   Abdominal:      General: Abdomen is flat. Bowel sounds are normal.      Palpations: Abdomen is soft.   Musculoskeletal:         General: Normal range of motion.      Cervical back: Full passive range of motion without pain, normal range of motion and neck supple.   Skin:     General: Skin is warm and dry.   Neurological:      General: No focal deficit present.      Mental Status: She is alert and oriented to person, place, and time.   Psychiatric:         Mood and Affect: Mood normal.         Speech: Speech normal.         Behavior: Behavior normal. Behavior is cooperative.         Thought Content: Thought content normal.         Judgment: Judgment normal.

## 2024-10-10 NOTE — PROGRESS NOTES
Assessment    1  Pre-op evaluation (V72 84) (Z01 818)   2  Chronic pain syndrome (338 4) (G89 4)   3  Postlaminectomy syndrome of lumbar region (722 83) (M96 1)    Discussion/Summary    79-year-old female, presents for preoperative evaluation, she has planned "removal of left buttocks SCS IPG"  This is planned for 7/14/17  The procedure is scheduled with Dr Nirali Walker, and the patient reports Dr Nirali Walker has explained in a very detailed fashion the proposed surgical procedure, risks and possible complications, as well as the benefits of the procedure  She expresses understanding of this information/explanation, and is in agreement with the proposed procedure and wishes to proceed  As noted:  She is a current everyday smoker, reporting less than one pack per day for more than 40 years  She has a prior history of surgery performed under general anesthesia which was tolerated without complication    She denies SOB or CP with activity  She denies bleeding disorder or history of same  She denies chronic pulmonary conditions  She is under the care of a primary care provider and is followed regularly for hypertension, hypothyroidism, and GERD  She reports medication allergies to morphine (nausea, vomiting and whole body pain) penicillin, (yeast infections), cortisone (Fever), adhesive tape (skin irritation)  She understands to avoid aspirin or NSAIDs 7 days prior to surgery    She understands she may take her usual medications with a small sip of water in early AM day of surgery  Postoperative activities were briefly reviewed, she understands she is to lift no greater than 10 pounds until follow-up in approximately 6 weeks, she also understands to avoid immersion in water for approximately 3 weeks, and additionally understands she may ambulate as tolerated and is encouraged to do so      Pre operative skin preparation was discussed with the patient and she understand to wash/ shower with Hibiclens (chlorhexidine) and utilize [employer] as directed  These findings, impressions and recommendations are reviewed in great detail with the patient, she expressed understanding and agreement, her questions were answered completely and to her satisfaction  Follow up has been scheduled  Chief Complaint  Preop H&P, planned removal of SCS IPG  History of Present Illness  Very pleasant 15-year-old female, presents for preop H&P as noted above  She has planned "removal of left buttocks SCS IPG"  This is planned for 7/14/17 to be performed by Jason Mcgill  She has history of chronic low back pain following lumbar fusion, approximately one year ago she had SCS placed, initially this is effective however as time progressed she started receiving "shocks" the IPG site [facility] unit has been turned off for the last month or so  She is a current everyday smoker approximately less than one pack per day, 40+ years intermittently she reports  She has a past surgical history of procedures performed under general anesthesia which include SCS placement, cholecystectomy, abdominal hernia repair Ã3, appendectomy, and bilateral total knee arthroplasties  She reports she tolerated anesthesia without complication  She denies shortness of breath or chest pain with activity including climbing 2 flights of stairs  Denies a history of asthma, bronchitis or recurrent pulmonary infection  She does note a history of asthma in her 25s, which has been resolved since then without any medication usage       She denies bleeding tendencies or history thereof  She denies use of aspirin, NSAIDs or other antiplatelet agents secondary to intolerance  She follows with her primary care provider, Dr Malia Ash, on a regular basis for hypertension, hypothyroidism, and GERD      She reports medication allergies to morphine (nausea, vomiting and whole body pain) penicillin, (yeast infections), cortisone (Fever), adhesive tape (skin irritation)  She otherwise denies diabetes mellitus, heart disease, lung disease, kidney or liver disorder, seizure, cancer, ulcer, rheumatic fever or tuberculosis  She has completed preoperative clearance by her primary care provider completed  And has planned preoperative laboratory testing following visit today  Review of Systems    Constitutional: feeling tired  Eyes: No complaints of eye pain, no red eyes, no eyesight problems, no discharge, no dry eyes, no itching of eyes  ENT: no complaints of earache, no loss of hearing, no nose bleeds, no nasal discharge, no sore throat, no hoarseness  Cardiovascular: No complaints of slow heart rate, no fast heart rate, no chest pain, no palpitations, no leg claudication, no lower extremity edema  Respiratory: No complaints of shortness of breath, no wheezing, no cough, no SOB on exertion, no orthopnea, no PND  Gastrointestinal: diarrhea  Genitourinary: incontinence  Musculoskeletal: arthralgias, limb pain and joint stiffness  Integumentary: a rash  Neurological: headache, numbness, tingling and n/t on both legs  Psychiatric: depression  Endocrine: No complaints of proptosis, no hot flashes, no muscle weakness, no deepening of the voice, no feelings of weakness  Hematologic/Lymphatic: No complaints of swollen glands, no swollen glands in the neck, does not bleed easily, does not bruise easily  ROS reviewed  Active Problems    1  Abdominal bloating (787 3) (R14 0)   2  Benign essential HTN (401 1) (I10)   3  Celiac disease (579 0) (K90 0)   4  Cervicalgia (723 1) (M54 2)   5  Chronic pain syndrome (338 4) (G89 4)   6  Cigarette smoker (305 1) (F17 210)   7  Common bile duct dilation (576 8) (K83 8)   8  Compression fracture of L1 lumbar vertebra (805 4) (S32 010A)   9  Depression (311) (F32 9)   10  Disc degeneration, 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)   11  Elevated liver enzymes (790 5) (R74 8)   12   Fibromyalgia (729 1) MDD (major depressive disorder) (M79 7)   13  Generalized osteoarthritis (715 00) (M15 9)   14  GERD (gastroesophageal reflux disease) (530 81) (K21 9)   15  History of gastric bypass (V45 86) (Z98 890)   16  Lumbar stenosis without neurogenic claudication (724 02) (M48 06)   17  Osteoarthrosis of knee (715 36) (M17 10)   18  Osteoporosis (733 00) (M81 0)   19  Peripheral neuropathy (356 9) (G62 9)   20  Postlaminectomy syndrome of lumbar region (722 83) (M96 1)   21  Postprocedural state (V45 89) (Z98 890)   22  Pre-op evaluation (V72 84) (Z01 818)   23  Pre-procedural laboratory examination (V72 63) (Z01 812)   24  Restless legs syndrome (333 94) (G25 81)   25  Screening for colon cancer (V76 51) (Z12 11)   26  Thyroid disorder (246 9) (E07 9)    Past Medical History    1  History of Abdominal pain, epigastric (789 06) (R10 13)   2  History of Abdominal pain, RUQ (789 01) (R10 11)   3  History of Anemia (285 9) (D64 9)   4  History of Asthma (493 90) (J45 909)   5  History of Asymptomatic menopausal state (V49 81) (Z78 0)   6  History of Backache (724 5) (M54 9)   7  History of Bowel incontinence (787 60) (R15 9)   8  History of Cigarette smoker (305 1) (F17 210)   9  History of Cough (786 2) (R05)   10  History of Galactorrhea With Normal Prolactins (611 6)   11  History of Gastritis (535 50) (K29 70)   12  History of abdominal pain (V13 89) (Z87 898)   13  History of diabetes mellitus (V12 29) (Z86 39)   14  History of diarrhea (V12 79) (Z87 898)   15  History of malignant neoplasm (V10 90) (Z85 9)   16  History of osteopenia (V13 59) (Z87 39)   17  History of thyroid disease (V12 29) (Z86 39)   18  History of upper respiratory infection (V12 09) (Z87 09)   19  History of Hyperlipidemia (272 4) (E78 5)   20  History of Hypoglycemia (251 2) (E16 2)   21  History of Incisional hernia (553 21) (K43 2)   22  History of Lymphadenopathy (785 6) (R59 1)   23  History of Malabsorption syndrome (579 9) (K90 9)   24   History of Need for influenza vaccination (V04 81) (Z23)   25  History of Obesity (278 00) (E66 9)   26  History of Pre-procedural examination (V72 84) (Z01 818)   27  History of Upper respiratory infection (465 9) (J06 9)    The active problems and past medical history were reviewed and updated today  Surgical History    1  History of Appendectomy   2  History of Back Surgery   3  History of  Section   4  History of Gastric Surgery For Morbid Obesity Gastric Bypass   5  History of Hernia Repair   6  History of Knee Surgery   7  History of Tubal Ligation   8  History of Wrist Surgery    The surgical history was reviewed and updated today  Family History  Mother    1  Family history of diabetes mellitus (V18 0) (Z83 3)   2  Family history of hypertension (V17 49) (Z82 49)   3  Family history of Retinal disease  Father    4  Family history of lung cancer (V16 1) (Z80 1)   5  Family history of malignant neoplasm of brain (V16 8) (Z80 8)   6  Family history of renal failure (V18 69) (Z84 1)  Family History    7  Family history of cardiac disorder (V17 49) (Z82 49)   8  Family history of diabetes mellitus (V18 0) (Z83 3)   9  Family history of hypertension (V17 49) (Z82 49)   10  Family history of myocardial infarction (V17 3) (Z82 49)   11  Family history of High cholesterol    The family history was reviewed and updated today  Social History    · Denied: History of Alcohol use   · Current Every Day Smoker (305 1)   · Denied: History of Drug use   · Social alcohol use (Z78 9)  The social history was reviewed and updated today  Current Meds   1  AmLODIPine Besylate 2 5 MG Oral Tablet; take 1 tablet by mouth daily; Therapy: 14DNB4378 to (Evaluate:02Fqb7429)  Requested for: 2017; Last   Rx:2017 Ordered   2  BuPROPion HCl ER (SR) 150 MG Oral Tablet Extended Release 12 Hour; take 1 tablet by   mouth daily; Therapy: 05JJH1956 to (Evaluate:78Lwz7262)  Requested for: 14URJ0445; Last   Rx:2017 Ordered   3  Calcium CAPS; Therapy: (Recorded:05May2017) to Recorded   4  DiazePAM 5 MG Oral Tablet; TAKE 1 TABLET BY MOUTH TWICE DAILY AS NEEDED; Therapy: 59QMP8355 to (Evaluate:21Jun2017)  Requested for: 07YHK5753; Last   Rx:22May2017 Ordered   5  Dicyclomine HCl - 20 MG Oral Tablet; TAKE 1 TABLET BY MOUTH EVERY 6 HOURS AS   NEEDED; Therapy: 30DHG6832 to (Evaluate:18Oct2017)  Requested for: 21Apr2017; Last   Rx:21Apr2017 Ordered   6  FentaNYL 25 MCG/HR Transdermal Patch 72 Hour; apply 1 patch every 72 hours; Therapy: 53QHY3944 to (Evaluate:29Jun2017); Last Rx:30May2017 Ordered   7  FLUoxetine HCl - 10 MG Oral Tablet; take 1 tablet by mouth daily; Therapy: 49AMQ0971 to (Evaluate:03Oeb4524)  Requested for: 18RYL4820; Last   Rx:13Jun2017 Ordered   8  Gabapentin 600 MG Oral Tablet; TAKE 1 TABLET BY MOUTH FOUR TIMES DAILY; Therapy: 61Edw4441 to (Evaluate:73Sxb8400)  Requested for: 54HEJ6441; Last   CZ:75MSH8204 Ordered   9  Hair/Skin/Nails TABS; Therapy: (Recorded:05May2017) to Recorded   10  Levothyroxine Sodium 25 MCG Oral Tablet; take 1 tablet by mouth daily; Therapy: 13CQY5694 to (Jadine Grams)  Requested for: 11Aug2016; Last    Rx:03Xcl9816 Ordered   11  Lisinopril 10 MG Oral Tablet; take 1 tablet by mouth daily; Therapy: 86Htk5189 to (Evaluate:92Ziw8018)  Requested for: 52IID2359; Last    Rx:11Jan2017 Ordered   12  Metoprolol Succinate ER 25 MG Oral Tablet Extended Release 24 Hour; take 1 tablet by    mouth every day; Therapy: 83JWJ2628 to (Evaluate:10Tya3873)  Requested for: 76MRK5083; Last    Rx:24Apr2017 Ordered   13  Mupirocin 2 % External Ointment; APPLY SPARINGLY EXTERNALLY TO THE AFFECTED    AREA 2 TO 3 TIMES DAILY; Therapy: 41TMG3286 to (Evaluate:05Apr2017)  Requested for: 68Ljc5742; Last    Rx:26Mar2017 Ordered   14  Oxybutynin Chloride 5 MG Oral Tablet; TAKE 1 TABLET BY MOUTH THREE TIMES DAILY;     Therapy: 72CVM1023 to (Evaluate:39Ymf1716)  Requested for: 43KIA7711; Last OI:35BPR6278 Ordered   15  Oxycodone-Acetaminophen 5-325 MG Oral Tablet; TAKE ONE TABLET EVERY SIX    HOURS AS NEEDED FOR BREAKTHROUGH PAIN;    Therapy: 06Cua3005 to (Last Rx:22May2017) Ordered   16  Pantoprazole Sodium 40 MG Oral Tablet Delayed Release; take 2 tablets by mouth daily; Therapy: 27CCR8299 to (Evaluate:27Dqb8148)  Requested for: 89GGC2172; Last    Rx:41Jxh2645 Ordered   17  Simethicone 125 MG Oral Tablet Chewable; CHEW AND SWALLOW 1 TABLET 4 TIMES    DAILY, AFTER MEALS AND AT BEDTIME; Therapy: 27IMP5058 to (Evaluate:08Nov2015); Last Rx:09Oct2015 Ordered   18  Super B Complex TABS; TAKE 1 TABLET DAILY AS DIRECTED; Therapy: (Recorded:38Fmc8278) to Recorded   19  Vitamin D 2000 UNIT Oral Capsule; Therapy: (Recorded:91Pyb5931) to Recorded    The medication list was reviewed and updated today  Allergies    1  Morphine Derivatives   2  Penicillins   3  Cortisone Acetate TABS   4  Morphine Sulfate TABS    5  Adhesive Tape    Vitals  Vital Signs    Recorded: [id number] 01:37PM   Temperature 97 9 F   Heart Rate 59   Respiration 16   Systolic 094   Diastolic 69   Height 5 ft 5 in   Weight 162 lb    BMI Calculated 26 96   BSA Calculated 1 81     Physical Exam     Constitutional Patient appears healthy and well developed  No signs of acute distress present  comfortable, within normal limits of ideal weight and appears older than stated age  Respiratory Respiratory effort: Normal   Auscultation of lungs: Clear to auscultation bilaterally  Cardiovascular Auscultation of heart: Rate is regular  Rhythm is regular  No heart murmur appreciated  Neurologic - Mental Status: Alert and Oriented x3  Mood and affect: Affect is normal   Grossly nonfocal    Motor System General Motor Strength: No pronator drift and no parietal drift  Gait and Station: [place] with a normal gait         Health Management  History of Other screening mammogram   Digital Bilateral Screening Mammogram With CAD; every 1 year; Next Due: 47MZZ1943; Overdue  Osteoporosis   * Dexa Scan Axial Skel (Hip, Pelvis, Spine); every 2 years; Last 84KSI8605; Next Due: 49DMB4138; Active  Screening for colon cancer   COLONOSCOPY; every 10 years; Last 59ULV8254; Next Due: 20MII4152; Active    Future Appointments    Date/Time Provider Specialty Site   07/14/2017 07:30 AM BARRY De La Garza   Neurosurgery [address] OR   07/28/2017 01:30 PM Hay Colin, [facility]   26/25/2043 28:52 PM Enrique Garza, DO Family [facility]     Signatures   Electronically signed by : Mirella Orta, [facility]; Thomas 15 2017  2:18PM EST                       (Author)    Electronically signed by : BARRY Soriano ; Jun 16 2017 12:53PM EST                       (Author)    Electronically signed by : BARRY Soriano ; Jun 16 2017 12:53PM EST                       (Author)

## (undated) DEVICE — SKIN MARKER DUAL TIP WITH RULER CAP, FLEXIBLE RULER AND LABELS: Brand: DEVON

## (undated) DEVICE — PREP SURGICAL PURPREP 26ML

## (undated) DEVICE — REM POLYHESIVE ADULT PATIENT RETURN ELECTRODE: Brand: VALLEYLAB

## (undated) DEVICE — VIAL DECANTER

## (undated) DEVICE — STRL UNIVERSAL MINOR GENERAL: Brand: CARDINAL HEALTH

## (undated) DEVICE — 3M™ TEGADERM™ TRANSPARENT FILM DRESSING FRAME STYLE, 1626W, 4 IN X 4-3/4 IN (10 CM X 12 CM), 50/CT 4CT/CASE: Brand: 3M™ TEGADERM™

## (undated) DEVICE — SYRINGE 10ML LL

## (undated) DEVICE — 2000CC GUARDIAN II: Brand: GUARDIAN

## (undated) DEVICE — BETADINE SCRUB BRUSH

## (undated) DEVICE — GLOVE SRG BIOGEL 8

## (undated) DEVICE — GLOVE INDICATOR PI UNDERGLOVE SZ 8.5 BLUE

## (undated) DEVICE — INTENDED FOR TISSUE SEPARATION, AND OTHER PROCEDURES THAT REQUIRE A SHARP SURGICAL BLADE TO PUNCTURE OR CUT.: Brand: BARD-PARKER SAFETY BLADES SIZE 10, STERILE

## (undated) DEVICE — SCD SEQUENTIAL COMPRESSION COMFORT SLEEVE MEDIUM KNEE LENGTH: Brand: KENDALL SCD

## (undated) DEVICE — GLOVE SRG BIOGEL 7

## (undated) DEVICE — ANTIBACTERIAL VIOLET BRAIDED (POLYGLACTIN 910), SYNTHETIC ABSORBABLE SUTURE: Brand: COATED VICRYL

## (undated) DEVICE — GLOVE INDICATOR PI UNDERGLOVE SZ 7 BLUE

## (undated) DEVICE — GLOVE INDICATOR PI UNDERGLOVE SZ 8 BLUE

## (undated) DEVICE — TELFA NON-ADHERENT ABSORBENT DRESSING: Brand: TELFA